# Patient Record
Sex: FEMALE | Race: WHITE
[De-identification: names, ages, dates, MRNs, and addresses within clinical notes are randomized per-mention and may not be internally consistent; named-entity substitution may affect disease eponyms.]

---

## 2018-03-12 ENCOUNTER — HOSPITAL ENCOUNTER (INPATIENT)
Dept: HOSPITAL 74 - JER | Age: 78
LOS: 3 days | Discharge: HOME | DRG: 101 | End: 2018-03-15
Attending: INTERNAL MEDICINE | Admitting: INTERNAL MEDICINE
Payer: COMMERCIAL

## 2018-03-12 VITALS — BODY MASS INDEX: 0.2 KG/M2

## 2018-03-12 DIAGNOSIS — F41.8: ICD-10-CM

## 2018-03-12 DIAGNOSIS — R56.9: Primary | ICD-10-CM

## 2018-03-12 DIAGNOSIS — C50.919: ICD-10-CM

## 2018-03-12 DIAGNOSIS — R55: ICD-10-CM

## 2018-03-12 LAB
ALBUMIN SERPL-MCNC: 3.5 G/DL (ref 3.4–5)
ALP SERPL-CCNC: 120 U/L (ref 45–117)
ALT SERPL-CCNC: 21 U/L (ref 12–78)
ANION GAP SERPL CALC-SCNC: 6 MMOL/L (ref 8–16)
AST SERPL-CCNC: 18 U/L (ref 15–37)
BASOPHILS # BLD: 0.8 % (ref 0–2)
BILIRUB SERPL-MCNC: 0.4 MG/DL (ref 0.2–1)
BUN SERPL-MCNC: 40 MG/DL (ref 7–18)
CALCIUM SERPL-MCNC: 8.2 MG/DL (ref 8.5–10.1)
CHLORIDE SERPL-SCNC: 111 MMOL/L (ref 98–107)
CO2 SERPL-SCNC: 26 MMOL/L (ref 21–32)
CREAT SERPL-MCNC: 0.7 MG/DL (ref 0.55–1.02)
DEPRECATED RDW RBC AUTO: 12.5 % (ref 11.6–15.6)
EOSINOPHIL # BLD: 3.1 % (ref 0–4.5)
GLUCOSE SERPL-MCNC: 103 MG/DL (ref 74–106)
HCT VFR BLD CALC: 32.9 % (ref 32.4–45.2)
HGB BLD-MCNC: 11.6 GM/DL (ref 10.7–15.3)
INR BLD: 1.17 (ref 0.82–1.09)
LYMPHOCYTES # BLD: 19.3 % (ref 8–40)
MAGNESIUM SERPL-MCNC: 2.2 MG/DL (ref 1.8–2.4)
MCH RBC QN AUTO: 31.4 PG (ref 25.7–33.7)
MCHC RBC AUTO-ENTMCNC: 35.4 G/DL (ref 32–36)
MCV RBC: 88.7 FL (ref 80–96)
MONOCYTES # BLD AUTO: 9.4 % (ref 3.8–10.2)
NEUTROPHILS # BLD: 67.4 % (ref 42.8–82.8)
PHOSPHATE SERPL-MCNC: 2.9 MG/DL (ref 2.5–4.9)
PLATELET # BLD AUTO: 177 K/MM3 (ref 134–434)
PMV BLD: 8.3 FL (ref 7.5–11.1)
POTASSIUM SERPLBLD-SCNC: 4 MMOL/L (ref 3.5–5.1)
PROT SERPL-MCNC: 6.3 G/DL (ref 6.4–8.2)
PT PNL PPP: 13.2 SEC (ref 9.98–11.88)
RBC # BLD AUTO: 3.7 M/MM3 (ref 3.6–5.2)
SODIUM SERPL-SCNC: 143 MMOL/L (ref 136–145)
WBC # BLD AUTO: 4.8 K/MM3 (ref 4–10)

## 2018-03-12 PROCEDURE — G0378 HOSPITAL OBSERVATION PER HR: HCPCS

## 2018-03-12 RX ADMIN — TRAZODONE HYDROCHLORIDE SCH: 50 TABLET ORAL at 23:09

## 2018-03-12 NOTE — PDOC
History of Present Illness





- General


Chief Complaint: Syncope/Near Syncope


Stated Complaint: SEIZURE


Time Seen by Provider: 03/12/18 17:19


History Source: Patient, Family (Daughter Mony at bedside )


Exam Limitations: Clinical Condition (Patient has limited hearing in left ear )





- History of Present Illness


Initial Comments: 








77 yr old female with past history of breast carcinoma, schizoaffective disorder

, and osteoarthritis presents to ED via EMS following seizure-like episodes. 

Patient states that she woke up this morning at 4 am with "tremors" in her legs 

and was unable to pour herself milk as her hands were shaking. She walked 

around and the tremors resolved but she continued to feel fatigued.  Around 

noon she went to an oncology appt with Dr. Abreu. While in the office seated in 

the exam chair the patient experienced an episode where head extended backward, 

her jaw dropped and she lost consciousness for about 1 min.  Patient did not 

hit her head during the episode. No shaking, incontinence, or tongue biting was 

noted by her daughter during the event. EMS was called to transfer the patient 

to the ED. While lying on the stretcher, the patient experienced a second 

episode where her head extended, her jaw dropped and she lost consciousness for 

about 1 min. During this episode EMS noted shaking of her limbs.  The patient 

admits to extreme fatigue and weakness prior to the episodes. Patient admits to 

mild headache, feelings of her heart racing, constipation, urinary retention x 

few months, mild SOB with exertion, and cold intolerance.  Patient denies fevers

, chills, changes in vision or difficulty speaking. she denies any fever, 

chills , recent cold, or sick contact. She denies any abdominal pain , N/V 

diarrhea. she denies any swelling in her feet . pt was not seen by physician 

for many years, she has dysuria, frequency and walk up multiple time to pass 

urine but nothing comes out. pt lives with her daughter. 








PMH: breast cancer, schizoaffective disorder, osteoarthritis


PSH: breast cancer left lumbectomy, right ear surgery when she was 8 years 

old. 


FH: breast cancer and colon cancer, prostate cancer


social History: denies any smoking, alcohol or elicit drugs abuse. lives with 

her daughter 


Allergies NKDA but did not tolerate zyprexa the generic brand 

















03/12/18 19:02





03/12/18 19:23








Past History





- Past Medical History


Allergies/Adverse Reactions: 


 Allergies











Allergy/AdvReac Type Severity Reaction Status Date / Time


 


No Known Drug Allergies Allergy   Verified 03/12/18 17:10











Home Medications: 


Ambulatory Orders





LORazepam [Ativan] 1 mg PO TID 06/10/13 


Olanzapine [Zyprexa] 15 mg PO HS 06/10/13 


Sertraline HCl [Zoloft -] 150 mg PO DAILY 06/10/13 


traZODone HCL [Desyrel -] 50 mg PO DAILY 06/10/13 








Cancer: Yes (lt breast)


COPD: No


Psychiatric Problems: Yes (depression, anxeity,schizoaffective disorder )





- Surgical History


Other Surgical History: 


Age 5, ear surgx 


03/12/18 18:45








- Family Disease History


Comment:: 





Family hx of colon CA and breast CA





- Suicide/Smoking/Psychosocial Hx


Smoking History: Never smoked


Have you smoked in the past 12 months: No


Information on smoking cessation initiated: No


Hx Alcohol Use: No


Drug/Substance Use Hx: No


Substance Use Type: None


Patient Lives Alone: No


Lives with/in: Lives with daughter 





*Physical Exam





- Vital Signs


 Last Vital Signs











Temp Pulse Resp BP Pulse Ox


 


 98.0 F   74   18   150/69   100 


 


 03/12/18 17:11  03/12/18 17:11  03/12/18 17:11  03/12/18 17:11  03/12/18 17:11














- Physical Exam


Comments: 


GENERAL: White female well-nourished and well-groomed in no acute distress


HEAD: Normocephalic; atraumatic 


NECK: Supple, no lymphadenopathy, no thyromegaly, trachea midline 


CARDIOVASCULAR: No JVD; Regular at 78 bpm; normal S1/S2 


LUNGS: CTA b/l no wheezes or rhonci 


ABD: No lesions or rashes; Normoactive bowel sounds; mild suprapubic tenderness

; no hepatomegaly/splenomegaly 


NEURO: CN II-XII grossly intact; decreased hearing in left ear; Sensation 

grossly intact UE/LE b/l; strength 5/5 b/l 


MSK: No bony deformities or contractures 











03/12/18 19:00








ED Treatment Course





- LABORATORY


CBC & Chemistry Diagram: 


 03/12/18 18:14





 03/12/18 18:49





- RADIOLOGY


Radiology Studies Ordered: 





03/12/18 19:32


Head CT without contrast: no intra cranial acute pathology





*DC/Admit/Observation/Transfer


Diagnosis at time of Disposition: 


 Seizure





Syncope


Qualifiers:


 Syncope type: unspecified Qualified Code(s): R55 - Syncope and collapse








- Discharge Dispostion


Admit: Yes





- Referrals





- Patient Instructions





- Post Discharge Activity

## 2018-03-12 NOTE — PDOC
Attending Attestation





- HPI


HPI: 








03/12/18 18:33


Pt is a 76 yo F BIBA with a PMHx of Anxiety, Depression, Breast CA, 

Schizophrenia who presents to the ED s/p new onset seizure today. As per 

daughter at bedside, patient was at her oncologists office for routine visit 

when she suddenly stared into space and fell to the ground. Patient seized on 

the ground for >5 minutes. Subsequently, patients post ictal period lasted 5 

minutes and patient returned to baseline. EMS arrived to office and patient 

later seized again enroute to the ED. Patient presents to the ED for further 

evaluation.   





As per daughter, patient woke up experiencing arm and lip tremors which later 

subsided. 


Note, patient on Ativan, Zoloft, Trazodone.  





Patient denies any head trauma, LOC, other injury. 


Patient denies tongue biting, nausea or vomiting. 


Patient   





Oncologist: Lois








- Physicial Exam


PE: 





03/12/18 18:33


GENERAL:


Well developed, well nourished. Awake and alert. No acute distress.


HEENT:


Normocephalic, atraumatic. PERRLA, EOMI. No conjunctival pallor. Sclera are non-

icteric. Moist mucous membranes. Oropharynx is clear.


NECK: Supple. Full ROM. No JVD. Carotid pulses 2+ and symmetric, without 

bruits. No thyromegaly. No lymphadenopathy.


CARDIOVASCULAR:


Regular rate and rhythm. No murmurs, rubs, or gallops. Distal pulses are 2+ and 

symmetric. 


PULMONARY: 


No evidence of respiratory distress. Lungs clear to auscultation bilaterally. 

No wheezing, rales or rhonchi.


ABDOMINAL:


Soft. Non-tender. Non-distended. No rebound or guarding. No organomegaly. 

Normoactive bowel sounds. 


MUSCULOSKELETAL 


Normal range of motion at all joints. No bony deformities or tenderness. No CVA 

tenderness.


EXTREMITIES: 


No cyanosis. No clubbing. No edema. No calf tenderness.


SKIN: Warm and dry. Normal capillary refill. No rashes. No jaundice. 


NEUROLOGICAL: Alert, awake, appropriate. Cranial nerves 2-12 intact. No 

deficits to light touch and temperature in face, upper extremities and lower 

extremities. No motor deficits in the in face, upper extremities and lower 

extremities. Normoreflexic in the upper and lower extremities. Normal speech. 

Toes are down-going bilaterally. 


PSYCHIATRIC: 


Cooperative. Good eye contact. Appropriate mood and affect.








- Medical Decision Making





03/12/18 18:34





Documentation prepared by Janet Arevalo, acting as medical scribe for Lynette Pennington MD





03/12/18 21:02


Cindy paged via phone answering service.


Awaiting call back. 





03/12/18 21:32


Cindy returned the page and the patients case was discussed. 





<Janet Arevalo - Last Filed: 03/12/18 21:32>





- Resident


Resident Name: GriseldaJim





- ED Attending Attestation


I have performed the following: I have examined & evaluated the patient, The 

case was reviewed & discussed with the resident, I agree w/resident's findings 

& plan, Exceptions are as noted





- HPI


HPI: 





03/12/18 17:58


77-year-old woman brought in by ambulance from Dr. Bayron Abreu's office after 

a witnessed seizure.  Patient has no known seizure history.  She was a past 

medical history significant for breast cancer in 2013, lumpectomy and takes 

Xanax, zoloft, trazodone





- Physicial Exam


PE: 








03/12/18 21:35


Spoke with Dr. Molina and the patient is admitted to [facility] telemetry.


Spoke with Dr. White from neurology and the patient will be started on Keppra 

500 twice a day and we will get MRI with contrast.  Gadolinium in the a.m.





<Lynette Pennington - Last Filed: 03/12/18 21:35>

## 2018-03-12 NOTE — HP
Admitting History and Physical





- Primary Care Physician


PCP: Maribell Molina





- Admission


Chief Complaint: seizure


History of Present Illness: 








78 yo F BIBA with a PMHx of Anxiety, Depression, Breast CA, Schizophrenia who 

presents to the ED s/p new onset seizure today. As per daughter at bedside, 

patient was at her oncologists office for routine visit when she suddenly 

stared into space and fell to the ground. Patient seized on the ground for >5 

minutes. Subsequently, patients post ictal period lasted 5 minutes and patient 

returned to baseline. EMS arrived to office and patient later seized again 

enroute to the ED. Patient presents to the ED for further evaluation.   





As per daughter, patient woke up experiencing arm and lip tremors which later 

subsided. 





history taken from ER records..poor historian








- Past Medical History


Psych: Yes: Anxiety, Depression





- Smoking History


Smoking history: Never smoked


Have you smoked in the past 12 months: No





- Alcohol/Substance Use


Hx Alcohol Use: No





Home Medications





- Allergies


Allergies/Adverse Reactions: 


 Allergies











Allergy/AdvReac Type Severity Reaction Status Date / Time


 


No Known Drug Allergies Allergy   Verified 03/12/18 17:10














- Home Medications


Home Medications: 


Ambulatory Orders





LORazepam [Ativan] 1 mg PO TID 06/10/13 


Olanzapine [Zyprexa] 15 mg PO HS 06/10/13 


Sertraline HCl [Zoloft -] 150 mg PO DAILY 06/10/13 


traZODone HCL [Desyrel -] 50 mg PO DAILY 06/10/13 











Physical Examination


Vital Signs: 


 Vital Signs











Temperature  98.0 F   03/12/18 17:11


 


Pulse Rate  74   03/12/18 17:11


 


Respiratory Rate  18   03/12/18 17:11


 


Blood Pressure  150/69   03/12/18 17:11


 


O2 Sat by Pulse Oximetry (%)  97   03/12/18 18:18











Constitutional: Yes: No Distress


HENT: Yes: Atraumatic


Neck: Yes: Supple


Cardiovascular: Yes: Regular Rate and Rhythm


Respiratory: Yes: CTA Bilaterally


Gastrointestinal: Yes: Normal Bowel Sounds


Extremities: Yes: WNL


Labs: 


 CBC, BMP





 03/12/18 18:14 





 03/12/18 18:49 











Imaging





- Results


Cat Scan: Report Reviewed





Problem List





- Problems


(1) Seizure


Assessment/Plan: 


on seizure meds


neuro consult


Code(s): R56.9 - UNSPECIFIED CONVULSIONS   





(2) Syncope


Code(s): R55 - SYNCOPE AND COLLAPSE   


Qualifiers: 


   Syncope type: unspecified   Qualified Code(s): R55 - Syncope and collapse   





(3) Anxiety and depression


Assessment/Plan: 


on meds


continue


Code(s): F41.9 - ANXIETY DISORDER, UNSPECIFIED; F32.9 - MAJOR DEPRESSIVE 

DISORDER, SINGLE EPISODE, UNSPECIFIED   





Assessment/Plan





 Laboratory Tests











  03/12/18 03/12/18 03/12/18





  17:54 18:14 18:14


 


WBC   4.8 


 


RBC   3.70 


 


Hgb   11.6 


 


Hct   32.9 


 


MCV   88.7 


 


MCH   31.4 


 


MCHC   35.4 


 


RDW   12.5 


 


Plt Count   177 


 


MPV   8.3 


 


Neutrophils %   67.4 


 


Lymphocytes %   19.3 


 


Monocytes %   9.4 


 


Eosinophils %   3.1 


 


Basophils %   0.8 


 


PT with INR  13.20 H  


 


INR  1.17 H  


 


Sodium    Cancelled


 


Potassium    Cancelled


 


Chloride    Cancelled


 


Carbon Dioxide    Cancelled


 


Anion Gap    Cancelled


 


BUN    Cancelled


 


Creatinine    Cancelled


 


Creat Clearance w eGFR    Cancelled


 


Random Glucose    Cancelled


 


Calcium    Cancelled


 


Phosphorus   


 


Magnesium   


 


Total Bilirubin    Cancelled


 


AST    Cancelled


 


ALT    Cancelled


 


Alkaline Phosphatase    Cancelled


 


Creatine Kinase    Cancelled


 


Troponin I    Cancelled


 


Total Protein    Cancelled


 


Albumin    Cancelled














  03/12/18 03/12/18 03/12/18





  18:49 18:49 20:07


 


WBC   


 


RBC   


 


Hgb   


 


Hct   


 


MCV   


 


MCH   


 


MCHC   


 


RDW   


 


Plt Count   


 


MPV   


 


Neutrophils %   


 


Lymphocytes %   


 


Monocytes %   


 


Eosinophils %   


 


Basophils %   


 


PT with INR   


 


INR   


 


Sodium  143  


 


Potassium  4.0  


 


Chloride  111 H  


 


Carbon Dioxide  26  


 


Anion Gap  6 L  


 


BUN  40 H  


 


Creatinine  0.7  


 


Creat Clearance w eGFR  > 60  


 


Random Glucose  103  


 


Calcium  8.2 L  


 


Phosphorus    2.9


 


Magnesium    2.2


 


Total Bilirubin  0.4  


 


AST  18  


 


ALT  21  


 


Alkaline Phosphatase  120 H  


 


Creatine Kinase   92 


 


Troponin I   < 0.02 


 


Total Protein  6.3 L  


 


Albumin  3.5  








Active Medications











Generic Name Dose Route Start Last Admin





  Trade Name Freq  PRN Reason Stop Dose Admin


 


Sodium Chloride  1,000 mls @ 75 mls/hr  03/12/18 19:15  03/12/18 19:45





  Normal Saline -  IV   75 mls/hr





  ASDIR CLARICE   Administration


 


Levetiracetam  500 mg  03/13/18 06:00  





  Keppra -  PO   





  BID CLARICE   


 


Trazodone HCl  50 mg  03/12/18 22:00  





  Desyrel -  PO   





  HS CLARICE

## 2018-03-13 LAB
ALBUMIN SERPL-MCNC: 3.1 G/DL (ref 3.4–5)
ALP SERPL-CCNC: 108 U/L (ref 45–117)
ALT SERPL-CCNC: 17 U/L (ref 12–78)
ANION GAP SERPL CALC-SCNC: 7 MMOL/L (ref 8–16)
APPEARANCE UR: CLEAR
AST SERPL-CCNC: 15 U/L (ref 15–37)
BACTERIA #/AREA URNS HPF: (no result) /HPF
BASOPHILS # BLD: 1.4 % (ref 0–2)
BILIRUB SERPL-MCNC: 0.7 MG/DL (ref 0.2–1)
BILIRUB UR STRIP.AUTO-MCNC: NEGATIVE MG/DL
BUN SERPL-MCNC: 26 MG/DL (ref 7–18)
CALCIUM SERPL-MCNC: 8.4 MG/DL (ref 8.5–10.1)
CHLORIDE SERPL-SCNC: 112 MMOL/L (ref 98–107)
CO2 SERPL-SCNC: 27 MMOL/L (ref 21–32)
COLOR UR: (no result)
CREAT SERPL-MCNC: 0.7 MG/DL (ref 0.55–1.02)
DEPRECATED RDW RBC AUTO: 12.1 % (ref 11.6–15.6)
EOSINOPHIL # BLD: 5.4 % (ref 0–4.5)
EPITH CASTS URNS QL MICRO: (no result) /HPF
GLUCOSE SERPL-MCNC: 92 MG/DL (ref 74–106)
HCT VFR BLD CALC: 28.9 % (ref 32.4–45.2)
HGB BLD-MCNC: 10.1 GM/DL (ref 10.7–15.3)
KETONES UR QL STRIP: NEGATIVE
LEUKOCYTE ESTERASE UR QL STRIP.AUTO: (no result)
LYMPHOCYTES # BLD: 33.7 % (ref 8–40)
MCH RBC QN AUTO: 31 PG (ref 25.7–33.7)
MCHC RBC AUTO-ENTMCNC: 35 G/DL (ref 32–36)
MCV RBC: 88.6 FL (ref 80–96)
MONOCYTES # BLD AUTO: 11.6 % (ref 3.8–10.2)
MUCOUS THREADS URNS QL MICRO: (no result)
NEUTROPHILS # BLD: 47.9 % (ref 42.8–82.8)
NITRITE UR QL STRIP: NEGATIVE
PH UR: 6 [PH] (ref 5–8)
PLATELET # BLD AUTO: 143 K/MM3 (ref 134–434)
PMV BLD: 7.6 FL (ref 7.5–11.1)
POTASSIUM SERPLBLD-SCNC: 4.2 MMOL/L (ref 3.5–5.1)
PROT SERPL-MCNC: 5.7 G/DL (ref 6.4–8.2)
PROT UR QL STRIP: NEGATIVE
PROT UR QL STRIP: NEGATIVE
RBC # BLD AUTO: 3.26 M/MM3 (ref 3.6–5.2)
RBC # UR STRIP: NEGATIVE /UL
SODIUM SERPL-SCNC: 146 MMOL/L (ref 136–145)
SP GR UR: 1.01 (ref 1–1.03)
UROBILINOGEN UR STRIP-MCNC: NEGATIVE MG/DL (ref 0.2–1)
WBC # BLD AUTO: 3.3 K/MM3 (ref 4–10)

## 2018-03-13 RX ADMIN — LEVETIRACETAM SCH MG: 500 TABLET, FILM COATED ORAL at 22:44

## 2018-03-13 RX ADMIN — HEPARIN SODIUM SCH UNIT: 5000 INJECTION, SOLUTION INTRAVENOUS; SUBCUTANEOUS at 10:33

## 2018-03-13 RX ADMIN — TRAZODONE HYDROCHLORIDE SCH MG: 50 TABLET ORAL at 22:44

## 2018-03-13 RX ADMIN — LEVETIRACETAM SCH MG: 500 TABLET, FILM COATED ORAL at 06:00

## 2018-03-13 RX ADMIN — LEVETIRACETAM SCH MG: 500 TABLET, FILM COATED ORAL at 10:33

## 2018-03-13 RX ADMIN — HEPARIN SODIUM SCH UNIT: 5000 INJECTION, SOLUTION INTRAVENOUS; SUBCUTANEOUS at 22:45

## 2018-03-13 RX ADMIN — SERTRALINE HYDROCHLORIDE SCH MG: 50 TABLET ORAL at 10:33

## 2018-03-13 RX ADMIN — LETROZOLE SCH MG: 2.5 TABLET, FILM COATED ORAL at 10:34

## 2018-03-13 NOTE — EKG
Test Reason : 

Blood Pressure : ***/*** mmHG

Vent. Rate : 072 BPM     Atrial Rate : 072 BPM

   P-R Int : 148 ms          QRS Dur : 088 ms

    QT Int : 364 ms       P-R-T Axes : 052 003 015 degrees

   QTc Int : 398 ms

 

NORMAL SINUS RHYTHM

POSSIBLE LEFT ATRIAL ENLARGEMENT

BORDERLINE ECG

WHEN COMPARED WITH ECG OF 06-APR-2006 22:31,

NO SIGNIFICANT CHANGE WAS FOUND

Confirmed by MD BERTO, MORIAH ([id number]) on 3/13/2018 12:18:49 PM

 

Referred By:             Confirmed By:MORIAH THOMSON MD

## 2018-03-13 NOTE — CON.NEURO
Consult





- History of Present Illness


History of Present Illness: 


78 yo F BIBA with a PMHx of Anxiety, Depression, Breast CA, Schizophrenia who 

presents to the ED s/p new onset seizure today. As per daughter at bedside, 

patient was at her oncologists office for routine visit when she suddenly 

stared into space and fell to the ground. Patient seized on the ground for >5 

minutes. Subsequently, patients post ictal period lasted 5 minutes and patient 

returned to baseline. EMS arrived to office and patient later seized again 

enroute to the ED. Patient presents to the ED for further evaluation.   





As per daughter, patient woke up experiencing arm and lip tremors which later 

subsided. 


Note, patient on Ativan, Zoloft, Trazodone.  





Patient denies any head trauma, LOC, other injury. 


Patient denies tongue biting, nausea or vomiting. 





MRI Brain: 


COMPARISON: 07/29/2016 MRI brain. FINDINGS: There is image degradation from 

patient motion on all postcontrast sequences, somewhat limiting evaluation and 

decreasing exam sensitivity for enhancing lesions There is no abnormal 

restricted diffusion within the brain. There is no definite enhancing 

intracranial mass, abnormal contrast enhancement within the brain or 

leptomeninges. There is generalized, age-related volume loss with secondary 

prominence of the CSF spaces. There is no hydrocephalus. There is no extra-

axial collection. No evidence of intra-axial hemorrhage. There are no mass 

effects. No midline shift or herniation pattern. The hippocampal formations are 

normal and symmetric in size and signal. The parahippocampal gyri and fornices 

are unremarkable. Flow related signal voids are present in the central arteries 

of the [place] and major dural sinuses. There is a partially empty 

sella turcica. The visualized bone marrow signal is unremarkable. There are no 

fluid levels in the visualized paranasal sinuses. Mild mucosal thickening along 

bilateral anterior ethmoids. There is fluid opacifying air cells and the tip of 

the left mastoid process. The visualized intraorbital contents are grossly 

unremarkable. IMPRESSION: No evidence of mesial temporal sclerosis, 

intracranial mass or cerebral infarction. No mass effects or hydrocephalus. 














- Alcohol/Substance Use


Hx Alcohol Use: No





- Smoking History


Smoking history: Never smoked


Have you smoked in the past 12 months: No





Home Medications





- Allergies


Allergies/Adverse Reactions: 


 Allergies











Allergy/AdvReac Type Severity Reaction Status Date / Time


 


No Known Drug Allergies Allergy   Verified 03/12/18 17:10














- Home Medications


Home Medications: 


Ambulatory Orders





LORazepam [Ativan] 1 mg PO TID 06/10/13 


Olanzapine [Zyprexa] 15 mg PO HS 06/10/13 


Sertraline HCl [Zoloft -] 150 mg PO DAILY 06/10/13 


traZODone HCL [Desyrel -] 50 mg PO DAILY 06/10/13 











Physical Exam-Neuro


Vital Signs: 


 Vital Signs











Temperature  100 F H  03/13/18 17:56


 


Pulse Rate  75   03/13/18 17:56


 


Respiratory Rate  18   03/13/18 17:56


 


Blood Pressure  128/75   03/13/18 17:56


 


O2 Sat by Pulse Oximetry (%)  95   03/13/18 09:00











Labs: 


 CBC, BMP





 03/13/18 05:30 





 03/13/18 05:30 





 INR, PTT











INR  1.17  (0.82-1.09)  H  03/12/18  17:54    














Imaging





- Results


MRI: Report Reviewed, Image Reviewed





Assessment/Plan


78 yo F BIBA with a PMHx of Anxiety, Depression, Breast CA, Schizophrenia who 

presents to the ED s/p new onset seizure x 2 . 


MRI Brain: no acute stroke or mass

## 2018-03-13 NOTE — CON.NEURO
Consult





- History of Present Illness


History of Present Illness: 





78 yo F BIBA with a PMHx of Anxiety, Depression, Breast CA, Schizophrenia who 

presents to the ED s/p new onset seizure today. As per daughter at bedside, 

patient was at her oncologists office for routine visit when she suddenly 

stared into space and fell to the ground, there were no movements at all, the 

LOC lasted for 1 min, she came to and was NOT confused. She was than sat up in 

a chair- had sudden" slumping" of head and LOC lasting 30 seconds, she again 

wasnot confused and no movements noted. . Initial notes document post ictal 

"confusion" but the daughter Mony denies these to me.returned to baseline. EMS 

arrived to office and patient later again had LOCx 30 seconds, this time ?? 

postictal confusion but no movements noted.enroute to the ED. Patient presents 

to the ED for further evaluation.   Pts. daughter also reports a hx. of "racing 

heart beat" in past.





As per daughter, patient woke up experiencing arm and lip tremors which later 

subsided. 


Note, patient on Ativan, Zoloft, Trazodone, Zyprtexa.  





Patient denies any head trauma, LOC, other injury. 


Patient denies tongue biting, nausea or vomiting. 





MRI Brain: 


COMPARISON: 07/29/2016 MRI brain. FINDINGS: There is image degradation from 

patient motion on all postcontrast sequences, somewhat limiting evaluation and 

decreasing exam sensitivity for enhancing lesions There is no abnormal 

restricted diffusion within the brain. There is no definite enhancing 

intracranial mass, abnormal contrast enhancement within the brain or 

leptomeninges. There is generalized, age-related volume loss with secondary 

prominence of the CSF spaces. There is no hydrocephalus. There is no extra-

axial collection. No evidence of intra-axial hemorrhage. There are no mass 

effects. No midline shift or herniation pattern. The hippocampal formations are 

normal and symmetric in size and signal. The parahippocampal gyri and fornices 

are unremarkable. Flow related signal voids are present in the central arteries 

of the [place] and major dural sinuses. There is a partially empty 

sella turcica. The visualized bone marrow signal is unremarkable. There are no 

fluid levels in the visualized paranasal sinuses. Mild mucosal thickening along 

bilateral anterior ethmoids. There is fluid opacifying air cells and the tip of 

the left mastoid process. The visualized intraorbital contents are grossly 

unremarkable. IMPRESSION: No evidence of mesial temporal sclerosis, 

intracranial mass or cerebral infarction. No mass effects or hydrocephalus. 








- Past Medical History


Psych: Yes: Anxiety, Depression





- Alcohol/Substance Use


Hx Alcohol Use: No





- Smoking History


Smoking history: Never smoked


Have you smoked in the past 12 months: No





Home Medications





- Allergies


Allergies/Adverse Reactions: 


 Allergies











Allergy/AdvReac Type Severity Reaction Status Date / Time


 


No Known Drug Allergies Allergy   Verified 03/12/18 17:10














- Home Medications


Home Medications: 


Ambulatory Orders





LORazepam [Ativan] 1 mg PO TID 06/10/13 


Olanzapine [Zyprexa] 15 mg PO HS 06/10/13 


Sertraline HCl [Zoloft -] 150 mg PO DAILY 06/10/13 


traZODone HCL [Desyrel -] 50 mg PO DAILY 06/10/13 











Physical Exam-Neuro


Vital Signs: 


 Vital Signs











Temperature  100 F H  03/13/18 17:56


 


Pulse Rate  75   03/13/18 17:56


 


Respiratory Rate  18   03/13/18 17:56


 


Blood Pressure  128/75   03/13/18 17:56


 


O2 Sat by Pulse Oximetry (%)  95   03/13/18 09:00











Labs: 


 CBC, BMP





 03/13/18 05:30 





 03/13/18 05:30 





 INR, PTT











INR  1.17  (0.82-1.09)  H  03/12/18  17:54    














- Neuro Exam


Level Of Consciousness: Yes: Alert, Oriented to Person, Oriented to Place (Not 

oriented to time/date)


Eyes: Yes: TIAN


Speech: WNL


Dominant Hand: Left


Mini Mental Exam: Impaired attention/concentration. Somewhat pressured speech.


Cranial Nerves II-XII Intact: Yes


Gag: Present


DTR's: 1+ Left Achilles, 1+ Right Achilles (Bilat.knee jerks-1+), 2+ Left Bicep

, 2+ Right Bicep, 2+ Left Tricep, 2+ Right Tricep, 2+ Left Brachioradialis, 2+ 

Right Brachioradialis


Motor Strength: 5/5: Left Arm, Right Arm, Left Leg, Right Leg


Gait: Other (Has been ambulating with cane x years secondary to bilat. knee 

arthritis)





Imaging





- Results


X-ray: Report Reviewed (MRI reviewed)





Assessment/Plan





78 yo F BIBA with a PMHx of Anxiety, Depression, Breast CA, Schizophrenia who 

presents to the ED s/p 3 events that are said to have been seizures. 


MRI Brain: no acute stroke or mass. By the daughters description she appears to 

have had syncopal episodes given lack of movements/incontinence and at least 

after 2 spells clearly no confusion.


Suggest:


Cont. Keppra 500mg bid


Syncope w/u-telemetry, cardiology consultation, carotid ultrasound and 

echocardiogram if deemed necessary by cardiology.





Thank you,





Efraín Oro MD.

## 2018-03-13 NOTE — PN
Progress Note, Physician


History of Present Illness: 





stable





- Current Medication List


Current Medications: 


Active Medications





Acetaminophen (Tylenol -)  650 mg PO Q6H PRN


   PRN Reason: FEVER


Heparin Sodium (Porcine) (Heparin -)  5,000 unit SQ BID [facility]


   Last Admin: 03/13/18 10:33 Dose:  5,000 unit


Letrozole (Femara -)  2.5 mg PO DAILY [facility]


   Last Admin: 03/13/18 10:34 Dose:  2.5 mg


Levetiracetam (Keppra -)  500 mg PO BID [facility]


   Last Admin: 03/13/18 10:33 Dose:  500 mg


Lorazepam (Ativan -)  1 mg PO BID [facility]


   Last Admin: 03/13/18 10:33 Dose:  1 mg


Olanzapine (Zyprexa -)  15 mg PO [facility]


   Last Admin: 03/12/18 23:08 Dose:  Not Given


Sertraline HCl (Zoloft -)  150 mg PO DAILY [facility]


   Last Admin: 03/13/18 10:33 Dose:  150 mg


Trazodone HCl (Desyrel -)  50 mg PO [facility]


   Last Admin: 03/12/18 23:09 Dose:  Not Given











- Objective


Vital Signs: 


 Vital Signs











Temperature  100 F H  03/13/18 17:56


 


Pulse Rate  75   03/13/18 17:56


 


Respiratory Rate  18   03/13/18 17:56


 


Blood Pressure  128/75   03/13/18 17:56


 


O2 Sat by Pulse Oximetry (%)  95   03/13/18 09:00











Constitutional: Yes: No Distress


HENT: Yes: Atraumatic


Neck: Yes: Supple


Cardiovascular: Yes: Regular Rate and Rhythm


Respiratory: Yes: CTA Bilaterally


Gastrointestinal: Yes: Normal Bowel Sounds


Extremities: Yes: WNL


Edema: No


Peripheral Pulses WNL: Yes


Neurological: Yes: Alert, Oriented


Labs: 


 CBC, BMP





 03/13/18 05:30 





 03/13/18 05:30 





 INR, PTT











INR  1.17  (0.82-1.09)  H  03/12/18  17:54    














Problem List





- Problems


(1) Seizure


Assessment/Plan: 


on seizure meds


seizure precautions


neuro consult


Code(s): R56.9 - UNSPECIFIED CONVULSIONS   





(2) Syncope


Code(s): R55 - SYNCOPE AND COLLAPSE   


Qualifiers: 


   Syncope type: unspecified   Qualified Code(s): R55 - Syncope and collapse   





(3) Anxiety and depression


Code(s): F41.9 - ANXIETY DISORDER, UNSPECIFIED; F32.9 - MAJOR DEPRESSIVE 

DISORDER, SINGLE EPISODE, UNSPECIFIED   





Assessment/Plan





d/w daughter in detail care plan

## 2018-03-14 RX ADMIN — HEPARIN SODIUM SCH UNIT: 5000 INJECTION, SOLUTION INTRAVENOUS; SUBCUTANEOUS at 10:40

## 2018-03-14 RX ADMIN — SERTRALINE HYDROCHLORIDE SCH MG: 50 TABLET ORAL at 10:40

## 2018-03-14 RX ADMIN — TRAZODONE HYDROCHLORIDE SCH MG: 50 TABLET ORAL at 21:21

## 2018-03-14 RX ADMIN — HEPARIN SODIUM SCH UNIT: 5000 INJECTION, SOLUTION INTRAVENOUS; SUBCUTANEOUS at 21:34

## 2018-03-14 RX ADMIN — LEVETIRACETAM SCH MG: 500 TABLET, FILM COATED ORAL at 21:21

## 2018-03-14 RX ADMIN — LETROZOLE SCH MG: 2.5 TABLET, FILM COATED ORAL at 10:40

## 2018-03-14 RX ADMIN — LEVETIRACETAM SCH MG: 500 TABLET, FILM COATED ORAL at 10:40

## 2018-03-14 NOTE — PN
Progress Note, Physician


History of Present Illness: 





stable





- Current Medication List


Current Medications: 


Active Medications





Acetaminophen (Tylenol -)  650 mg PO Q6H PRN


   PRN Reason: FEVER


Heparin Sodium (Porcine) (Heparin -)  5,000 unit SQ BID [facility]


   Last Admin: 03/14/18 10:40 Dose:  5,000 unit


Letrozole (Femara -)  2.5 mg PO DAILY [facility]


   Last Admin: 03/14/18 10:40 Dose:  2.5 mg


Levetiracetam (Keppra -)  500 mg PO BID [facility]


   Last Admin: 03/14/18 10:40 Dose:  500 mg


Lorazepam (Ativan -)  1 mg PO BID [facility]


   Last Admin: 03/14/18 10:40 Dose:  1 mg


Olanzapine (Zyprexa -)  15 mg PO HS [facility]


   Last Admin: 03/13/18 22:45 Dose:  15 mg


Sertraline HCl (Zoloft -)  150 mg PO DAILY [facility]


   Last Admin: 03/14/18 10:40 Dose:  150 mg


Trazodone HCl (Desyrel -)  50 mg PO HS [facility]


   Last Admin: 03/13/18 22:44 Dose:  50 mg











- Objective


Vital Signs: 


 Vital Signs











Temperature  98.4 F   03/14/18 14:13


 


Pulse Rate  80   03/14/18 14:13


 


Respiratory Rate  14   03/14/18 14:13


 


Blood Pressure  113/64   03/14/18 14:13


 


O2 Sat by Pulse Oximetry (%)  95   03/14/18 09:00











Constitutional: Yes: No Distress


HENT: Yes: Atraumatic


Neck: Yes: Supple


Cardiovascular: Yes: Regular Rate and Rhythm


Respiratory: Yes: CTA Bilaterally


Gastrointestinal: Yes: Normal Bowel Sounds


Extremities: Yes: WNL


Edema: No


Peripheral Pulses WNL: Yes


Neurological: Yes: Alert, Oriented


Labs: 


 CBC, BMP





 03/13/18 05:30 





 03/13/18 05:30 





 INR, PTT











INR  1.17  (0.82-1.09)  H  03/12/18  17:54    














Problem List





- Problems


(1) Seizure


Assessment/Plan: 


on seizure meds


seizure precautions


neuro consult


mri done report reviewed


Code(s): R56.9 - UNSPECIFIED CONVULSIONS   





(2) Syncope


Assessment/Plan: 


will get carotid us


cardiology consult as per neuro recommendation


troponin negative


Code(s): R55 - SYNCOPE AND COLLAPSE   


Qualifiers: 


   Syncope type: unspecified   Qualified Code(s): R55 - Syncope and collapse   





(3) Anxiety and depression


Code(s): F41.9 - ANXIETY DISORDER, UNSPECIFIED; F32.9 - MAJOR DEPRESSIVE 

DISORDER, SINGLE EPISODE, UNSPECIFIED   





(4) Breast CA


Assessment/Plan: 


h/o


stable


Code(s): C50.919 - MALIGNANT NEOPLASM OF UNSP SITE OF UNSPECIFIED FEMALE BREAST

   


Qualifiers: 


   Laterality: unspecified laterality

## 2018-03-15 VITALS — DIASTOLIC BLOOD PRESSURE: 68 MMHG | SYSTOLIC BLOOD PRESSURE: 118 MMHG

## 2018-03-15 VITALS — HEART RATE: 72 BPM | TEMPERATURE: 98.6 F

## 2018-03-15 RX ADMIN — LEVETIRACETAM SCH MG: 500 TABLET, FILM COATED ORAL at 09:28

## 2018-03-15 RX ADMIN — SERTRALINE HYDROCHLORIDE SCH MG: 50 TABLET ORAL at 09:28

## 2018-03-15 RX ADMIN — LETROZOLE SCH MG: 2.5 TABLET, FILM COATED ORAL at 09:28

## 2018-03-15 RX ADMIN — HEPARIN SODIUM SCH UNIT: 5000 INJECTION, SOLUTION INTRAVENOUS; SUBCUTANEOUS at 09:27

## 2018-03-15 NOTE — DS
Physical Examination


Vital Signs: 


 Vital Signs











Temperature  98.6 F   03/15/18 14:00


 


Pulse Rate  72   03/15/18 14:00


 


Respiratory Rate  16   03/15/18 14:00


 


Blood Pressure  112/56   03/15/18 14:00


 


O2 Sat by Pulse Oximetry (%)  95   03/15/18 09:00











Constitutional: Yes: No Distress


HENT: Yes: Atraumatic


Neck: Yes: Supple


Cardiovascular: Yes: Regular Rate and Rhythm


Respiratory: Yes: CTA Bilaterally


Gastrointestinal: Yes: Normal Bowel Sounds


Extremities: Yes: WNL


Edema: Yes


Peripheral Pulses WNL: Yes


Neurological: Yes: Alert, Oriented


Labs: 


 CBC, BMP





 03/13/18 05:30 





 03/13/18 05:30 











Discharge Summary


Reason For Visit: SEIZURE,SYNCOPE


Current Active Problems





Anxiety and depression (Acute)


Breast CA (Acute)


Seizure (Acute)


Syncope (Acute)











- Instructions





- Home Medications


Comprehensive Discharge Medication List: 


Ambulatory Orders





LORazepam [Ativan] 1 mg PO TID 06/10/13 


Olanzapine [Zyprexa] 15 mg PO HS 06/10/13 


Sertraline HCl [Zoloft -] 150 mg PO DAILY 06/10/13 


traZODone HCL [Desyrel -] 50 mg PO DAILY 06/10/13 





dc home


d/w neuro and cardio...follow up in their offices

## 2018-03-15 NOTE — CON.CARD
Consult


Consult Specialty:: Cardiology


Referred by:: Dr. Molina


Reason for Consultation:: Seizure vs Syncope





- History of Present Illness


Chief Complaint: I "passed out"


History of Present Illness: 





77F with PMH Breast CA admitted to tele with seizure vs syncope.


Episode of altered MS yesterday- " blank stare", unresponsive.





She denies CP, SOB, pleuritic pain.


Denies Palps.


Seen by Neuro, MRI brain negative.





While I was examining her she had another episode of unresponsiveness, eyes open

, blinking, not speaking.


Had pulse entire time and NSR on Tele.


After approximately 10 seconds, regained normal mental status





- History Source


History Provided By: Patient, Medical Record





- Past Medical History


Heme/Onc: Yes: Other (Breast CA)


Psych: Yes: Anxiety, Depression





- Alcohol/Substance Use


Hx Alcohol Use: No





- Smoking History


Smoking history: Never smoked


Have you smoked in the past 12 months: No





- Social History


Usual Living Arrangement: With Child





Home Medications





- Allergies


Allergies/Adverse Reactions: 


 Allergies











Allergy/AdvReac Type Severity Reaction Status Date / Time


 


No Known Drug Allergies Allergy   Verified 03/12/18 17:10














- Home Medications


Home Medications: 


Ambulatory Orders





LORazepam [Ativan] 1 mg PO TID 06/10/13 


Olanzapine [Zyprexa] 15 mg PO HS 06/10/13 


Sertraline HCl [Zoloft -] 150 mg PO DAILY 06/10/13 


traZODone HCL [Desyrel -] 50 mg PO DAILY 06/10/13 











Family Disease History





- Family Disease History


Family History: Unremarkable (not pertinent to this presentation)





Review of Systems


Findings/Remarks: 





ER record reviewed:











"77 yr old female with past history of breast carcinoma, schizoaffective 

disorder, and osteoarthritis presents to ED via EMS following seizure-like 

episodes. Patient states that she woke up this morning at 4 am with "tremors" 

in her legs and was unable to pour herself milk as her hands were shaking. She 

walked around and the tremors resolved but she continued to feel fatigued.  

Around noon she went to an oncology appt with Dr. Abreu. While in the office 

seated in the exam chair the patient experienced an episode where head extended 

backward, her jaw dropped and she lost consciousness for about 1 min.  Patient 

did not hit her head during the episode. No shaking, incontinence, or tongue 

biting was noted by her daughter during the event. EMS was called to transfer 

the patient to the ED. While lying on the stretcher, the patient experienced a 

second episode where her head extended, her jaw dropped and she lost 

consciousness for about 1 min. During this episode EMS noted shaking of her 

limbs.  The patient admits to extreme fatigue and weakness prior to the 

episodes. Patient admits to mild headache, feelings of her heart racing, 

constipation, urinary retention x few months, mild SOB with exertion, and cold 

intolerance.  Patient denies fevers, chills, changes in vision or difficulty 

speaking. she denies any fever, chills , recent cold, or sick contact. She 

denies any abdominal pain , N/V diarrhea. she denies any swelling in her feet . 

pt was not seen by physician for many years, she has dysuria, frequency and 

walk up multiple time to pass urine but nothing comes out. pt lives with her 

daughter. 








PMH: breast cancer, schizoaffective disorder, osteoarthritis"





- Review of Systems


Constitutional: reports: No Symptoms


Eyes: reports: No Symptoms


HENT: reports: No Symptoms


Neck: reports: No Symptoms


Cardiovascular: reports: No Symptoms


Respiratory: reports: No Symptoms


Gastrointestinal: reports: No Symptoms


Genitourinary: reports: No Symptoms


Neurological: reports: Change in LOC


Endocrine: reports: No Symptoms


Hematology/Lymphatic: reports: No Symptoms


Psychiatric: reports: No Symptoms


Vital Signs: 


 Vital Signs











Temperature  98.1 F   03/15/18 05:28


 


Pulse Rate  70   03/15/18 05:28


 


Respiratory Rate  18   03/15/18 05:28


 


Blood Pressure  116/71   03/15/18 05:28


 


O2 Sat by Pulse Oximetry (%)  95   03/14/18 21:00











Constitutional: Yes: No Distress, Calm


Eyes: Yes: Conjunctiva Clear, EOM Intact


HENT: Yes: Atraumatic, Normocephalic


Neck: Yes: Trachea Midline


Respiratory: Yes: CTA Bilaterally


Gastrointestinal: Yes: Soft


Cardiovascular: Yes: Regular Rate and Rhythm, Other (no murmurs)


JVD: No


Carotid Bruit: No


PMI: Non-Displaced


Heart Sounds: Yes: S1, S2 (RRR)


Edema: No


Peripheral Pulses WNL: Yes


Neurological: Yes: Alert


...Motor Strength: WNL





- Other Data


Labs, Other Data: 


 CBC, BMP





 03/13/18 05:30 





 03/13/18 05:30 





 INR, PTT











INR  1.17  (0.82-1.09)  H  03/12/18  17:54    








 Laboratory Tests











  03/12/18 03/12/18 03/13/18





  17:54 18:49 05:30


 


WBC    3.3 L D


 


Hgb    10.1 L D


 


Plt Count    143


 


INR  1.17 H  


 


Sodium   


 


Potassium   


 


BUN   


 


Creatinine   


 


Creatine Kinase   92 


 


Troponin I   < 0.02 














  03/13/18





  05:30


 


WBC 


 


Hgb 


 


Plt Count 


 


INR 


 


Sodium  146 H


 


Potassium  4.2


 


BUN  26 H


 


Creatinine  0.7


 


Creatine Kinase 


 


Troponin I 














NSR, Nl QT


Echo: Pending





Imaging





- Results


Chest X-ray: Report Reviewed, Image Reviewed


Cat Scan: Report Reviewed


MRI: Report Reviewed





Problem List





- Problems


(1) Breast CA


Code(s): C50.919 - MALIGNANT NEOPLASM OF UNSP SITE OF UNSPECIFIED FEMALE BREAST

   


Qualifiers: 


   Laterality: unspecified laterality 





(2) Seizure


Code(s): R56.9 - UNSPECIFIED CONVULSIONS   





Assessment/Plan





IMP:


History of breast CA


Episodes of altered MS, recurrent.  Most likely seizures





Episode during my exam, normal sinus rhythm on telemetry.





REC:


1. Neuro work up in progress


2. Echo for EF assessment; agree with Carotid Duplex to assess vertebral artery 

patency/blood flow.


3. Continue telemetry 


4. Seizure precautions








Thank you.

## 2018-03-15 NOTE — PN
Progress Note (short form)





- Note


Progress Note: 





Addendum : pt appears clinically stable


will get outpt EEG monitoring 


neuro cleared for DC 





Dr White

## 2018-03-15 NOTE — PN
Progress Note (short form)





- Note


Progress Note: 


76 yo F BIBA with a PMHx of Anxiety, Depression, Breast CA, Schizophrenia who 

presents to the ED s/p new onset seizure today. As per daughter at bedside, 

patient was at her oncologists office for routine visit when she suddenly 

stared into space and fell to the ground, there were no movements at all, the 

LOC lasted for 1 min, she came to and was NOT confused. She was than sat up in 

a chair- had sudden" slumping" of head and LOC lasting 30 seconds, she again 

wasnot confused and no movements noted. . Initial notes document post ictal 

"confusion" but the daughter Mony denies these to me.returned to baseline. EMS 

arrived to office and patient later again had LOCx 30 seconds, this time ?? 

postictal confusion but no movements noted.enroute to the ED. Patient presents 

to the ED for further evaluation.   Pts. daughter also reports a hx. of "racing 

heart beat" in past.


Patient denies any head trauma, LOC, other injury. 


Patient denies tongue biting, nausea or vomiting. 








FU : 


on keppra 500BID , as per cardiology while examining her she had staring spell,

  


 MRI (-)





MRI Brain: 


COMPARISON: 07/29/2016 MRI brain. FINDINGS: There is image degradation from 

patient motion on all postcontrast sequences, somewhat limiting evaluation and 

decreasing exam sensitivity for enhancing lesions There is no abnormal 

restricted diffusion within the brain. There is no definite enhancing 

intracranial mass, abnormal contrast enhancement within the brain or 

leptomeninges. There is generalized, age-related volume loss with secondary 

prominence of the CSF spaces. There is no hydrocephalus. There is no extra-

axial collection. No evidence of intra-axial hemorrhage. There are no mass 

effects. No midline shift or herniation pattern. The hippocampal formations are 

normal and symmetric in size and signal. The parahippocampal gyri and fornices 

are unremarkable. Flow related signal voids are present in the central arteries 

of the [place] and major dural sinuses. There is a partially empty 

sella turcica. The visualized bone marrow signal is unremarkable. There are no 

fluid levels in the visualized paranasal sinuses. Mild mucosal thickening along 

bilateral anterior ethmoids. There is fluid opacifying air cells and the tip of 

the left mastoid process. The visualized intraorbital contents are grossly 

unremarkable. IMPRESSION: No evidence of mesial temporal sclerosis, 

intracranial mass or cerebral infarction. No mass effects or hydrocephalus. 








- Past Medical History


Psych: Yes: Anxiety, Depression





- Alcohol/Substance Use


Hx Alcohol Use: No





- Smoking History


Smoking history: Never smoked


Have you smoked in the past 12 months: No





Home Medications





- Allergies


Allergies/Adverse Reactions: 


 Allergies











Allergy/AdvReac Type Severity Reaction Status Date / Time


 


No Known Drug Allergies Allergy   Verified 03/12/18 17:10














- Home Medications


Home Medications: 


Ambulatory Orders





LORazepam [Ativan] 1 mg PO TID 06/10/13 


Olanzapine [Zyprexa] 15 mg PO HS 06/10/13 


Sertraline HCl [Zoloft -] 150 mg PO DAILY 06/10/13 


traZODone HCL [Desyrel -] 50 mg PO DAILY 06/10/13 











Physical Exam-Neuro


Vital Signs: 


 


 Vital Signs











Temperature  98.1 F   03/15/18 05:28


 


Pulse Rate  70   03/15/18 05:28


 


Respiratory Rate  18   03/15/18 05:28


 


Blood Pressure  116/71   03/15/18 05:28


 


O2 Sat by Pulse Oximetry (%)  95   03/14/18 21:00














Labs: 


 CBCD











WBC  3.3 K/mm3 (4.0-10.0)  L D 03/13/18  05:30    


 


RBC  3.26 M/mm3 (3.60-5.2)  L  03/13/18  05:30    


 


Hgb  10.1 GM/dL (10.7-15.3)  L D 03/13/18  05:30    


 


Hct  28.9 % (32.4-45.2)  L  03/13/18  05:30    


 


MCV  88.6 fl (80-96)   03/13/18  05:30    


 


MCHC  35.0 g/dl (32.0-36.0)   03/13/18  05:30    


 


RDW  12.1 % (11.6-15.6)   03/13/18  05:30    


 


Plt Count  143 K/MM3 (134-434)   03/13/18  05:30    


 


MPV  7.6 fl (7.5-11.1)   03/13/18  05:30    








 CMP











Sodium  146 mmol/L (136-145)  H  03/13/18  05:30    


 


Potassium  4.2 mmol/L (3.5-5.1)   03/13/18  05:30    


 


Chloride  112 mmol/L ()  H  03/13/18  05:30    


 


Carbon Dioxide  27 mmol/L (21-32)   03/13/18  05:30    


 


Anion Gap  7  (8-16)  L  03/13/18  05:30    


 


BUN  26 mg/dL (7-18)  H  03/13/18  05:30    


 


Creatinine  0.7 mg/dL (0.55-1.02)   03/13/18  05:30    


 


Creat Clearance w eGFR  > 60  (>60)   03/13/18  05:30    


 


Calcium  8.4 mg/dL (8.5-10.1)  L  03/13/18  05:30    


 


Total Bilirubin  0.7 mg/dL (0.2-1.0)  D 03/13/18  05:30    


 


AST  15 U/L (15-37)   03/13/18  05:30    


 


ALT  17 U/L (12-78)   03/13/18  05:30    


 


Alkaline Phosphatase  108 U/L ()   03/13/18  05:30    


 


Total Protein  5.7 g/dl (6.4-8.2)  L  03/13/18  05:30    


 


Albumin  3.1 g/dl (3.4-5.0)  L  03/13/18  05:30    

















- Neuro Exam


Level Of Consciousness: Yes: Alert, Oriented to Person, Oriented to Place (Not 

oriented to time/date)


Eyes: Yes: TIAN


Speech: WNL


Dominant Hand: Left


Mini Mental Exam: Impaired attention/concentration. Somewhat pressured speech.


Cranial Nerves II-XII Intact: Yes


Gag: Present


DTR's: 1+ Left Achilles, 1+ Right Achilles (Bilat.knee jerks-1+), 2+ Left Bicep

, 2+ Right Bicep, 2+ Left Tricep, 2+ Right Tricep, 2+ Left Brachioradialis, 2+ 

Right Brachioradialis


Motor Strength: 5/5: Left Arm, Right Arm, Left Leg, Right Leg


Gait: Other (Has been ambulating with cane x years secondary to bilat. knee 

arthritis)





Imaging





- Results


X-ray: Report Reviewed (MRI reviewed)





Assessment/Plan





76 yo F BIBA with a PMHx of Anxiety, Depression, Breast CA, Schizophrenia who 

presents to the ED s/p 3 events that are said to have been seizures. 


MRI Brain: no acute stroke or mass. 


? another staring spell event / seizure 


inc Keppra 750mg bid 


get routine EEG 


cardiac FONTANEZ for ? underlying syncope. 








Dr White

## 2018-04-05 ENCOUNTER — EMERGENCY (EMERGENCY)
Facility: HOSPITAL | Age: 78
LOS: 0 days | Discharge: HOME | End: 2018-04-05
Attending: STUDENT IN AN ORGANIZED HEALTH CARE EDUCATION/TRAINING PROGRAM | Admitting: STUDENT IN AN ORGANIZED HEALTH CARE EDUCATION/TRAINING PROGRAM
Payer: MEDICARE

## 2018-04-05 VITALS
TEMPERATURE: 99 F | WEIGHT: 139.99 LBS | DIASTOLIC BLOOD PRESSURE: 73 MMHG | SYSTOLIC BLOOD PRESSURE: 157 MMHG | RESPIRATION RATE: 16 BRPM | OXYGEN SATURATION: 100 % | HEART RATE: 95 BPM

## 2018-04-05 DIAGNOSIS — F20.9 SCHIZOPHRENIA, UNSPECIFIED: ICD-10-CM

## 2018-04-05 DIAGNOSIS — F32.9 MAJOR DEPRESSIVE DISORDER, SINGLE EPISODE, UNSPECIFIED: ICD-10-CM

## 2018-04-05 DIAGNOSIS — Z79.899 OTHER LONG TERM (CURRENT) DRUG THERAPY: ICD-10-CM

## 2018-04-05 DIAGNOSIS — R00.2 PALPITATIONS: ICD-10-CM

## 2018-04-05 DIAGNOSIS — F41.9 ANXIETY DISORDER, UNSPECIFIED: ICD-10-CM

## 2018-04-05 DIAGNOSIS — G47.00 INSOMNIA, UNSPECIFIED: ICD-10-CM

## 2018-04-05 DIAGNOSIS — Z85.3 PERSONAL HISTORY OF MALIGNANT NEOPLASM OF BREAST: ICD-10-CM

## 2018-04-05 LAB
ALBUMIN SERPL ELPH-MCNC: 3.7 G/DL — SIGNIFICANT CHANGE UP (ref 3.3–5)
ALP SERPL-CCNC: 129 U/L — HIGH (ref 40–120)
ALT FLD-CCNC: 27 U/L — SIGNIFICANT CHANGE UP (ref 12–78)
AMMONIA BLD-MCNC: 22 UMOL/L — SIGNIFICANT CHANGE UP (ref 11–32)
ANION GAP SERPL CALC-SCNC: 6 MMOL/L — SIGNIFICANT CHANGE UP (ref 5–17)
APAP SERPL-MCNC: < 2 UG/ML (ref 10–30)
APPEARANCE UR: CLEAR — SIGNIFICANT CHANGE UP
AST SERPL-CCNC: 20 U/L — SIGNIFICANT CHANGE UP (ref 15–37)
BASOPHILS # BLD AUTO: 0.05 K/UL — SIGNIFICANT CHANGE UP (ref 0–0.2)
BASOPHILS NFR BLD AUTO: 1 % — SIGNIFICANT CHANGE UP (ref 0–2)
BILIRUB SERPL-MCNC: 0.4 MG/DL — SIGNIFICANT CHANGE UP (ref 0.2–1.2)
BILIRUB UR-MCNC: NEGATIVE — SIGNIFICANT CHANGE UP
BUN SERPL-MCNC: 22 MG/DL — SIGNIFICANT CHANGE UP (ref 7–23)
CALCIUM SERPL-MCNC: 9.1 MG/DL — SIGNIFICANT CHANGE UP (ref 8.5–10.1)
CHLORIDE SERPL-SCNC: 107 MMOL/L — SIGNIFICANT CHANGE UP (ref 96–108)
CO2 SERPL-SCNC: 29 MMOL/L — SIGNIFICANT CHANGE UP (ref 22–31)
COLOR SPEC: YELLOW — SIGNIFICANT CHANGE UP
CREAT SERPL-MCNC: 1.08 MG/DL — SIGNIFICANT CHANGE UP (ref 0.5–1.3)
DIFF PNL FLD: NEGATIVE — SIGNIFICANT CHANGE UP
EOSINOPHIL # BLD AUTO: 0.05 K/UL — SIGNIFICANT CHANGE UP (ref 0–0.5)
EOSINOPHIL NFR BLD AUTO: 1 % — SIGNIFICANT CHANGE UP (ref 0–6)
ETHANOL SERPL-MCNC: <10 MG/DL — SIGNIFICANT CHANGE UP (ref 0–10)
GLUCOSE SERPL-MCNC: 97 MG/DL — SIGNIFICANT CHANGE UP (ref 70–99)
GLUCOSE UR QL: NEGATIVE MG/DL — SIGNIFICANT CHANGE UP
HCT VFR BLD CALC: 34 % — LOW (ref 34.5–45)
HGB BLD-MCNC: 11.7 G/DL — SIGNIFICANT CHANGE UP (ref 11.5–15.5)
IMM GRANULOCYTES NFR BLD AUTO: 0.2 % — SIGNIFICANT CHANGE UP (ref 0–1.5)
KETONES UR-MCNC: NEGATIVE — SIGNIFICANT CHANGE UP
LEUKOCYTE ESTERASE UR-ACNC: NEGATIVE — SIGNIFICANT CHANGE UP
LYMPHOCYTES # BLD AUTO: 0.64 K/UL — LOW (ref 1–3.3)
LYMPHOCYTES # BLD AUTO: 12.6 % — LOW (ref 13–44)
MAGNESIUM SERPL-MCNC: 2.2 MG/DL — SIGNIFICANT CHANGE UP (ref 1.6–2.6)
MCHC RBC-ENTMCNC: 30.5 PG — SIGNIFICANT CHANGE UP (ref 27–34)
MCHC RBC-ENTMCNC: 34.4 GM/DL — SIGNIFICANT CHANGE UP (ref 32–36)
MCV RBC AUTO: 88.8 FL — SIGNIFICANT CHANGE UP (ref 80–100)
MONOCYTES # BLD AUTO: 0.46 K/UL — SIGNIFICANT CHANGE UP (ref 0–0.9)
MONOCYTES NFR BLD AUTO: 9.1 % — SIGNIFICANT CHANGE UP (ref 2–14)
NEUTROPHILS # BLD AUTO: 3.87 K/UL — SIGNIFICANT CHANGE UP (ref 1.8–7.4)
NEUTROPHILS NFR BLD AUTO: 76.1 % — SIGNIFICANT CHANGE UP (ref 43–77)
NITRITE UR-MCNC: NEGATIVE — SIGNIFICANT CHANGE UP
NRBC # BLD: 0 /100 WBCS — SIGNIFICANT CHANGE UP (ref 0–0)
PH UR: 6.5 — SIGNIFICANT CHANGE UP (ref 5–8)
PLATELET # BLD AUTO: 181 K/UL — SIGNIFICANT CHANGE UP (ref 150–400)
POTASSIUM SERPL-MCNC: 4.2 MMOL/L — SIGNIFICANT CHANGE UP (ref 3.5–5.3)
POTASSIUM SERPL-SCNC: 4.2 MMOL/L — SIGNIFICANT CHANGE UP (ref 3.5–5.3)
PROT SERPL-MCNC: 7.3 GM/DL — SIGNIFICANT CHANGE UP (ref 6–8.3)
PROT UR-MCNC: NEGATIVE MG/DL — SIGNIFICANT CHANGE UP
RBC # BLD: 3.83 M/UL — SIGNIFICANT CHANGE UP (ref 3.8–5.2)
RBC # FLD: 12 % — SIGNIFICANT CHANGE UP (ref 10.3–14.5)
SALICYLATES SERPL-MCNC: <1.7 MG/DL (ref 2.8–20)
SODIUM SERPL-SCNC: 142 MMOL/L — SIGNIFICANT CHANGE UP (ref 135–145)
SP GR SPEC: 1 — LOW (ref 1.01–1.02)
TSH SERPL-MCNC: 0.86 UU/ML — SIGNIFICANT CHANGE UP (ref 0.36–3.74)
UROBILINOGEN FLD QL: NEGATIVE MG/DL — SIGNIFICANT CHANGE UP
WBC # BLD: 5.08 K/UL — SIGNIFICANT CHANGE UP (ref 3.8–10.5)
WBC # FLD AUTO: 5.08 K/UL — SIGNIFICANT CHANGE UP (ref 3.8–10.5)

## 2018-04-05 PROCEDURE — 70450 CT HEAD/BRAIN W/O DYE: CPT | Mod: 26

## 2018-04-05 PROCEDURE — 99284 EMERGENCY DEPT VISIT MOD MDM: CPT

## 2018-04-05 PROCEDURE — 71045 X-RAY EXAM CHEST 1 VIEW: CPT | Mod: 26

## 2018-04-05 PROCEDURE — 93010 ELECTROCARDIOGRAM REPORT: CPT

## 2018-04-05 RX ORDER — LEVETIRACETAM 250 MG/1
1000 TABLET, FILM COATED ORAL ONCE
Qty: 0 | Refills: 0 | Status: COMPLETED | OUTPATIENT
Start: 2018-04-05 | End: 2018-04-05

## 2018-04-05 RX ORDER — LEVETIRACETAM 250 MG/1
1000 TABLET, FILM COATED ORAL ONCE
Qty: 0 | Refills: 0 | Status: DISCONTINUED | OUTPATIENT
Start: 2018-04-05 | End: 2018-04-05

## 2018-04-05 RX ADMIN — LEVETIRACETAM 1000 MILLIGRAM(S): 250 TABLET, FILM COATED ORAL at 16:23

## 2018-04-05 NOTE — ED PROVIDER NOTE - OBJECTIVE STATEMENT
Patient is a 78 yo female with known hx of anxiety, depression, schizophrenia, remote hx of breast cancer; reports that she took "extra dose" of ativan today as her heart is racing "too much for too long"- no chest pain; no sob; reports that she lives with her daughter and  but they were not at home today; reports previous psychiatric hospitalization in 1963, no chest pain; no sob; no abdominal pain; no urinary complaints; no trauma or fall; not on any anticoagulation; patient is a non smoker; no alcohol use; no illicit drug use.

## 2018-04-05 NOTE — ED ADULT TRIAGE NOTE - CHIEF COMPLAINT QUOTE
Pt took "half an Ativan" after difficulty sleeping last night.  (Ativan and Zyprexa prescribed meds.)  Drowsy on arrival.

## 2018-04-05 NOTE — ED PROVIDER NOTE - PROGRESS NOTE DETAILS
Michelle LAGOS: Case discussed with patient's private psychiatrist- Dr. Potter- seen patient for last 12 years; hx of anxiety, insomnia- has never required hospitalization during this time; no concern for SI/HI, patient was hospitalized in outside hospital 3 weeks ago for seizure disorder- supposed to be taking keppra but is non compliant with regimen, lives with daughter at home. Michelle DO: Patient with ?seizure activity in ED- will give dose of Keppra while in ED; will discuss case with case management regarding disposition home. Michelle LAGOS: Patient's daughter to pick her up tomorrow afternoon; s/o to Dr. Lu pending transport home tomorrow. Michelle LAGOS: Patient's daughter at bedside to pick patient up at this time.

## 2018-04-05 NOTE — ED ADULT NURSE NOTE - OBJECTIVE STATEMENT
Patient comes to ED for sleep issues last night. pt reports having feeling of fast heartbeat that has been happening often and made her unable to sleep. pt reports taking anxiety medication which they decreased recently. Pt reports taking an ativan which she is prescribed to help her relax. pt denies any chest pain or SOB. pt denies any suicidal or homicidal ideations. Pt is drowsy but arousable. Pt is AxOx4

## 2018-04-05 NOTE — ED PROVIDER NOTE - MEDICAL DECISION MAKING DETAILS
78 yo female with anxiety; insomnia; previous with not being able to sleep; CT scan of head; labs; EKG/CXR; UA; re-eval

## 2018-04-06 VITALS
SYSTOLIC BLOOD PRESSURE: 115 MMHG | OXYGEN SATURATION: 97 % | DIASTOLIC BLOOD PRESSURE: 63 MMHG | RESPIRATION RATE: 16 BRPM | HEART RATE: 81 BPM

## 2018-04-06 LAB
CULTURE RESULTS: NO GROWTH — SIGNIFICANT CHANGE UP
SPECIMEN SOURCE: SIGNIFICANT CHANGE UP

## 2018-04-06 RX ORDER — SERTRALINE 25 MG/1
100 TABLET, FILM COATED ORAL DAILY
Qty: 0 | Refills: 0 | Status: DISCONTINUED | OUTPATIENT
Start: 2018-04-06 | End: 2018-04-05

## 2018-04-06 RX ADMIN — Medication 1 MILLIGRAM(S): at 07:28

## 2018-04-06 RX ADMIN — SERTRALINE 100 MILLIGRAM(S): 25 TABLET, FILM COATED ORAL at 07:30

## 2018-04-06 NOTE — ED ADULT NURSE REASSESSMENT NOTE - NS ED NURSE REASSESS COMMENT FT1
Ellen GAVIN stated pt's daughter will not be here until at least 5 pm. Pt resting comfortable and ate lunch.
Patient ambulated to bathroom with assist
Pt awaiting daughter to . daughter is coming from [place]. Pt has 24 hr aide and they left the key in the house  and doesn't have a spare key. will continue to monitor.
Pt sleeping in bed comfortably with no complaints. VSS. pt will be awaiting ride tomorrow from daughter
Pt's daughter ge called and stated her other sister is coming to . Not sure what time she will get here and doesn't have cell phone.
RN left message for pt's daughter Otto and Sara re p/u of pt.  Awaiting call back.
Report taken at the change of shift at bedside. Pt awake alert and oriented x4 sleeping in bed with no acute distress noted. Awaiting for daughter for . Will cont to monitor for safety and comfort.
ambulation trial completed
pt resting comfortably in bed with no acute distress noted. Will cont to monitor for safety and comfort.
pt resting comfortably in bed with no acute distress noted. vss. Will cont to monitor for safety and comfort.
pt resting comfortably in bed with no acute distress noted. vss. awaiting for daughter for . Will cont to monitor for safety and comfort.

## 2018-04-06 NOTE — ED ADULT NURSE REASSESSMENT NOTE - STATUS
awaiting discharge, no change
awaiting for daughter to /awaiting discharge, no change
awaiting discharge, no change
awaiting discharge, no change/awaiting for daughter to 
awaiting discharge, no change/daughter to

## 2018-04-06 NOTE — ED ADULT NURSE REASSESSMENT NOTE - COMFORT CARE
ambulated to bathroom
meal provided/plan of care explained/po fluids offered/assisted to bathroom
plan of care explained/po fluids offered/assisted to bathroom
plan of care explained/po fluids offered/assisted to bathroom/meal provided
assisted to bathroom/plan of care explained
po fluids offered/meal provided/plan of care explained

## 2018-04-06 NOTE — ED ADULT NURSE REASSESSMENT NOTE - GENERAL PATIENT STATE
comfortable appearance

## 2018-04-06 NOTE — ED ADULT NURSE REASSESSMENT NOTE - TEMPLATE LIST FOR HEAD TO TOE ASSESSMENT
Psych/Behavioral

## 2018-04-07 ENCOUNTER — EMERGENCY (EMERGENCY)
Facility: HOSPITAL | Age: 78
LOS: 1 days | Discharge: PSYCHIATRIC OR SUBSTANCE USE FACILITY | End: 2018-04-07
Attending: EMERGENCY MEDICINE
Payer: MEDICARE

## 2018-04-07 VITALS
SYSTOLIC BLOOD PRESSURE: 117 MMHG | RESPIRATION RATE: 18 BRPM | HEIGHT: 63.5 IN | OXYGEN SATURATION: 100 % | DIASTOLIC BLOOD PRESSURE: 65 MMHG | HEART RATE: 84 BPM | TEMPERATURE: 98 F | WEIGHT: 134.92 LBS

## 2018-04-07 DIAGNOSIS — F33.9 MAJOR DEPRESSIVE DISORDER, RECURRENT, UNSPECIFIED: ICD-10-CM

## 2018-04-07 DIAGNOSIS — R69 ILLNESS, UNSPECIFIED: ICD-10-CM

## 2018-04-07 LAB
ALBUMIN SERPL ELPH-MCNC: 4.6 G/DL — SIGNIFICANT CHANGE UP (ref 3.3–5)
ALP SERPL-CCNC: 146 U/L — HIGH (ref 40–120)
ALT FLD-CCNC: 21 U/L RC — SIGNIFICANT CHANGE UP (ref 10–45)
ANION GAP SERPL CALC-SCNC: 15 MMOL/L — SIGNIFICANT CHANGE UP (ref 5–17)
APAP SERPL-MCNC: <15 UG/ML — SIGNIFICANT CHANGE UP (ref 10–30)
APPEARANCE UR: ABNORMAL
AST SERPL-CCNC: 19 U/L — SIGNIFICANT CHANGE UP (ref 10–40)
BACTERIA # UR AUTO: ABNORMAL /HPF
BASOPHILS # BLD AUTO: 0 K/UL — SIGNIFICANT CHANGE UP (ref 0–0.2)
BASOPHILS NFR BLD AUTO: 0.4 % — SIGNIFICANT CHANGE UP (ref 0–2)
BILIRUB SERPL-MCNC: 0.4 MG/DL — SIGNIFICANT CHANGE UP (ref 0.2–1.2)
BILIRUB UR-MCNC: NEGATIVE — SIGNIFICANT CHANGE UP
BUN SERPL-MCNC: 32 MG/DL — HIGH (ref 7–23)
CALCIUM SERPL-MCNC: 10.4 MG/DL — SIGNIFICANT CHANGE UP (ref 8.4–10.5)
CHLORIDE SERPL-SCNC: 101 MMOL/L — SIGNIFICANT CHANGE UP (ref 96–108)
CO2 SERPL-SCNC: 26 MMOL/L — SIGNIFICANT CHANGE UP (ref 22–31)
COLOR SPEC: YELLOW — SIGNIFICANT CHANGE UP
CREAT SERPL-MCNC: 0.9 MG/DL — SIGNIFICANT CHANGE UP (ref 0.5–1.3)
DIFF PNL FLD: NEGATIVE — SIGNIFICANT CHANGE UP
EOSINOPHIL # BLD AUTO: 0.1 K/UL — SIGNIFICANT CHANGE UP (ref 0–0.5)
EOSINOPHIL NFR BLD AUTO: 1.2 % — SIGNIFICANT CHANGE UP (ref 0–6)
EPI CELLS # UR: SIGNIFICANT CHANGE UP /HPF
ETHANOL SERPL-MCNC: SIGNIFICANT CHANGE UP MG/DL (ref 0–10)
GLUCOSE SERPL-MCNC: 146 MG/DL — HIGH (ref 70–99)
GLUCOSE UR QL: NEGATIVE — SIGNIFICANT CHANGE UP
HCT VFR BLD CALC: 37.1 % — SIGNIFICANT CHANGE UP (ref 34.5–45)
HGB BLD-MCNC: 13.1 G/DL — SIGNIFICANT CHANGE UP (ref 11.5–15.5)
KETONES UR-MCNC: NEGATIVE — SIGNIFICANT CHANGE UP
LEUKOCYTE ESTERASE UR-ACNC: ABNORMAL
LYMPHOCYTES # BLD AUTO: 0.9 K/UL — LOW (ref 1–3.3)
LYMPHOCYTES # BLD AUTO: 14.5 % — SIGNIFICANT CHANGE UP (ref 13–44)
MCHC RBC-ENTMCNC: 32 PG — SIGNIFICANT CHANGE UP (ref 27–34)
MCHC RBC-ENTMCNC: 35.4 GM/DL — SIGNIFICANT CHANGE UP (ref 32–36)
MCV RBC AUTO: 90.3 FL — SIGNIFICANT CHANGE UP (ref 80–100)
MONOCYTES # BLD AUTO: 0.4 K/UL — SIGNIFICANT CHANGE UP (ref 0–0.9)
MONOCYTES NFR BLD AUTO: 5.8 % — SIGNIFICANT CHANGE UP (ref 2–14)
NEUTROPHILS # BLD AUTO: 5 K/UL — SIGNIFICANT CHANGE UP (ref 1.8–7.4)
NEUTROPHILS NFR BLD AUTO: 78.1 % — HIGH (ref 43–77)
NITRITE UR-MCNC: NEGATIVE — SIGNIFICANT CHANGE UP
PCP SPEC-MCNC: SIGNIFICANT CHANGE UP
PH UR: 6.5 — SIGNIFICANT CHANGE UP (ref 5–8)
PLATELET # BLD AUTO: 199 K/UL — SIGNIFICANT CHANGE UP (ref 150–400)
POTASSIUM SERPL-MCNC: 4.2 MMOL/L — SIGNIFICANT CHANGE UP (ref 3.5–5.3)
POTASSIUM SERPL-SCNC: 4.2 MMOL/L — SIGNIFICANT CHANGE UP (ref 3.5–5.3)
PROT SERPL-MCNC: 8.3 G/DL — SIGNIFICANT CHANGE UP (ref 6–8.3)
PROT UR-MCNC: SIGNIFICANT CHANGE UP
RBC # BLD: 4.11 M/UL — SIGNIFICANT CHANGE UP (ref 3.8–5.2)
RBC # FLD: 11.1 % — SIGNIFICANT CHANGE UP (ref 10.3–14.5)
RBC CASTS # UR COMP ASSIST: SIGNIFICANT CHANGE UP /HPF (ref 0–2)
SALICYLATES SERPL-MCNC: <2 MG/DL — LOW (ref 15–30)
SODIUM SERPL-SCNC: 142 MMOL/L — SIGNIFICANT CHANGE UP (ref 135–145)
SP GR SPEC: 1.02 — SIGNIFICANT CHANGE UP (ref 1.01–1.02)
UROBILINOGEN FLD QL: NEGATIVE — SIGNIFICANT CHANGE UP
WBC # BLD: 6.4 K/UL — SIGNIFICANT CHANGE UP (ref 3.8–10.5)
WBC # FLD AUTO: 6.4 K/UL — SIGNIFICANT CHANGE UP (ref 3.8–10.5)
WBC UR QL: SIGNIFICANT CHANGE UP /HPF (ref 0–5)

## 2018-04-07 PROCEDURE — 99283 EMERGENCY DEPT VISIT LOW MDM: CPT | Mod: GC

## 2018-04-07 NOTE — ED PROVIDER NOTE - PHYSICAL EXAMINATION
Physical Exam: elderly F who is AAOx3, cooperative with examination, non-labored breathing, normal heart rate, normal peripheral perfusion, No focal motor or sensory deficits. ~ Asher Zhou MD
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
## 2018-04-07 NOTE — ED BEHAVIORAL HEALTH ASSESSMENT NOTE - OTHER PAST PSYCHIATRIC HISTORY (INCLUDE DETAILS REGARDING ONSET, COURSE OF ILLNESS, INPATIENT/OUTPATIENT TREATMENT)
long psychiatric history, patient states depressed since 2 years old, one suicide attempt at age 13, last psychiatric hospitalization in 1960s, has seen psychiatrists since
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
## 2018-04-07 NOTE — ED ADULT TRIAGE NOTE - CHIEF COMPLAINT QUOTE
Pt's daughter stated that pt told her she wants to kill herself. Pt and son in law got into an argument. pt with schizo affective disorder
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
## 2018-04-07 NOTE — ED BEHAVIORAL HEALTH ASSESSMENT NOTE - DESCRIPTION
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

## 2018-04-07 NOTE — ED PROVIDER NOTE - OBJECTIVE STATEMENT
Asher Zhou MD (resident): 77 F w/ Hx of bipolar d/o, anxiety and depression, who p/w suicidal ideation.   Meds: zoloft, ativan, zyprexa, trazadone, letrozole. Asher Zhou MD (resident): 77 F w/ Hx of schizoaffective d/o, bipolar d/o, anxiety and depression, who p/w suicidal ideation. Daughter reported that pt wants to kill herself. Pt recently moved in w/ daughter. Had argument w/ son in law. Pt asked for a knife to cut herself with. Pt denies any HI or hallucinations.   Meds: zoloft, ativan, zyprexa, trazadone, letrozole.

## 2018-04-07 NOTE — ED ADULT TRIAGE NOTE - NS_BH TRG QUESTION7_ED_ALL_ED
Anxiety (includes Panic, OCD)/Other/Depression (without Suicidality or Psychosis)/Behavioral Problems (includes ADHD, Oppositionality)

## 2018-04-07 NOTE — ED ADULT NURSE REASSESSMENT NOTE - NS ED NURSE REASSESS COMMENT FT1
Pt waiting for bed at [facility], per MD no beds available for this evening, pt to stay in unit under constant observation for the evening.
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
## 2018-04-07 NOTE — ED BEHAVIORAL HEALTH ASSESSMENT NOTE - SUMMARY
Ms. Bernard is a 78 y/o woman with long past psychiatric history (unclear diagnosis), starting at age 2 per patient, one suicide attempt at age 13 by sticking head in oven, per daughter last psychiatric hospitalization was in the 1960s,  x3 years, currently domiciled with daughter and son in law in [place], Past medical history s/p breast cancer and arthritis, now BIB daughter to ED in context of worsening depression and suicidal ideation with plan.  Given that patient's depression is worsening, that she continues to have SI currently with plan to slit her wrists, and given unstable home situation and unclear whether patient is safe to return home to son in law, would recommend psychiatric hospitalization. Patient and daughter would like admission and signed voluntary paperwork.

## 2018-04-07 NOTE — ED BEHAVIORAL HEALTH ASSESSMENT NOTE - CURRENT MEDICATION
Letrozole 2.5 mg po daily, Ativan 1 mg po bid, Trazodone 50 mg po qHS, Zyprexa 15 mg po qHS, Zoloft 100 mg po daily

## 2018-04-07 NOTE — ED ADULT NURSE NOTE - NS_BH TRG QUESTION7_ED_ALL_ED
Other/Behavioral Problems (includes ADHD, Oppositionality)/Depression (without Suicidality or Psychosis)/Anxiety (includes Panic, OCD)

## 2018-04-07 NOTE — ED PROVIDER NOTE - PROGRESS NOTE DETAILS
Asher Zhou MD (resident): patient seen by Psych attending who recommended voluntary admission to psychiatric facility. Currently no beds at [facility], she will reach out to . Asher Zhou MD (resident): called by Psych attending and no medical bed at [facility], reason for medical bed is that pt uses walker to ambulate. will need to stay in the ED until AM for further placement w/ SW (on call SW was called by Psych, but unable to expedite placement over night, referred to AM SW). medically cleared, nml VS, tolerating PO, remaining stable, transferred for continued inpatient psychiatric services / monitoring to [facility] [place] -- Gunner Yao MD

## 2018-04-08 VITALS
RESPIRATION RATE: 18 BRPM | SYSTOLIC BLOOD PRESSURE: 146 MMHG | DIASTOLIC BLOOD PRESSURE: 77 MMHG | HEART RATE: 92 BPM | TEMPERATURE: 99 F | OXYGEN SATURATION: 98 %

## 2018-04-08 LAB
CULTURE RESULTS: SIGNIFICANT CHANGE UP
SPECIMEN SOURCE: SIGNIFICANT CHANGE UP

## 2018-04-08 PROCEDURE — 85027 COMPLETE CBC AUTOMATED: CPT

## 2018-04-08 PROCEDURE — 81001 URINALYSIS AUTO W/SCOPE: CPT

## 2018-04-08 PROCEDURE — 99285 EMERGENCY DEPT VISIT HI MDM: CPT

## 2018-04-08 PROCEDURE — 93005 ELECTROCARDIOGRAM TRACING: CPT

## 2018-04-08 PROCEDURE — 80053 COMPREHEN METABOLIC PANEL: CPT

## 2018-04-08 PROCEDURE — 87086 URINE CULTURE/COLONY COUNT: CPT

## 2018-04-08 PROCEDURE — 80307 DRUG TEST PRSMV CHEM ANLYZR: CPT

## 2018-04-08 RX ORDER — LETROZOLE 2.5 MG/1
2.5 TABLET, FILM COATED ORAL ONCE
Qty: 0 | Refills: 0 | Status: COMPLETED | OUTPATIENT
Start: 2018-04-08 | End: 2018-04-08

## 2018-04-08 RX ORDER — SERTRALINE 25 MG/1
100 TABLET, FILM COATED ORAL ONCE
Qty: 0 | Refills: 0 | Status: COMPLETED | OUTPATIENT
Start: 2018-04-08 | End: 2018-04-08

## 2018-04-08 RX ADMIN — Medication 1 MILLIGRAM(S): at 07:42

## 2018-04-08 RX ADMIN — SERTRALINE 100 MILLIGRAM(S): 25 TABLET, FILM COATED ORAL at 09:01

## 2018-04-08 RX ADMIN — LETROZOLE 2.5 MILLIGRAM(S): 2.5 TABLET, FILM COATED ORAL at 11:45

## 2018-04-08 NOTE — ED ADULT NURSE REASSESSMENT NOTE - NS ED NURSE REASSESS COMMENT FT1
Received report from ED RN Gunner; patient currently sleeping and remains on 1:1 for safety and SI. Will continue to monitor and patient safety maintained.

## 2018-04-08 NOTE — ED ADULT NURSE REASSESSMENT NOTE - NS ED NURSE REASSESS COMMENT FT1
Patient remains asleep- 1:1 constant observation still in progress. Will continue to monitor and patient safety maintained.

## 2018-04-08 NOTE — ED ADULT NURSE REASSESSMENT NOTE - NS ED NURSE REASSESS COMMENT FT1
Patient resting comfortably- 1:1 constant observation still in progress. Will continue to monitor and patient safety maintained.

## 2018-04-09 PROBLEM — C50.919 MALIGNANT NEOPLASM OF UNSPECIFIED SITE OF UNSPECIFIED FEMALE BREAST: Chronic | Status: ACTIVE | Noted: 2018-04-05

## 2018-04-09 PROBLEM — F20.9 SCHIZOPHRENIA, UNSPECIFIED: Chronic | Status: ACTIVE | Noted: 2018-04-05

## 2018-08-11 ENCOUNTER — EMERGENCY (EMERGENCY)
Facility: HOSPITAL | Age: 78
LOS: 1 days | Discharge: HOME | End: 2018-08-11
Attending: EMERGENCY MEDICINE
Payer: MEDICARE

## 2018-08-11 VITALS
HEART RATE: 84 BPM | SYSTOLIC BLOOD PRESSURE: 97 MMHG | TEMPERATURE: 99 F | HEIGHT: 64 IN | RESPIRATION RATE: 16 BRPM | OXYGEN SATURATION: 96 % | WEIGHT: 138.89 LBS | DIASTOLIC BLOOD PRESSURE: 61 MMHG

## 2018-08-11 DIAGNOSIS — F25.0 SCHIZOAFFECTIVE DISORDER, BIPOLAR TYPE: ICD-10-CM

## 2018-08-11 LAB
AMPHET UR-MCNC: NEGATIVE — SIGNIFICANT CHANGE UP
ANION GAP SERPL CALC-SCNC: 13 MMOL/L — SIGNIFICANT CHANGE UP (ref 5–17)
APPEARANCE UR: CLEAR — SIGNIFICANT CHANGE UP
BACTERIA # UR AUTO: ABNORMAL
BARBITURATES UR SCN-MCNC: NEGATIVE — SIGNIFICANT CHANGE UP
BENZODIAZ UR-MCNC: NEGATIVE — SIGNIFICANT CHANGE UP
BILIRUB UR-MCNC: NEGATIVE — SIGNIFICANT CHANGE UP
BUN SERPL-MCNC: 22 MG/DL — HIGH (ref 8–20)
CALCIUM SERPL-MCNC: 9 MG/DL — SIGNIFICANT CHANGE UP (ref 8.6–10.2)
CHLORIDE SERPL-SCNC: 103 MMOL/L — SIGNIFICANT CHANGE UP (ref 98–107)
CO2 SERPL-SCNC: 24 MMOL/L — SIGNIFICANT CHANGE UP (ref 22–29)
COCAINE METAB.OTHER UR-MCNC: NEGATIVE — SIGNIFICANT CHANGE UP
COLOR SPEC: YELLOW — SIGNIFICANT CHANGE UP
CREAT SERPL-MCNC: 0.93 MG/DL — SIGNIFICANT CHANGE UP (ref 0.5–1.3)
DIFF PNL FLD: NEGATIVE — SIGNIFICANT CHANGE UP
EPI CELLS # UR: SIGNIFICANT CHANGE UP
GLUCOSE SERPL-MCNC: 106 MG/DL — SIGNIFICANT CHANGE UP (ref 70–115)
GLUCOSE UR QL: NEGATIVE MG/DL — SIGNIFICANT CHANGE UP
HCT VFR BLD CALC: 31.6 % — LOW (ref 37–47)
HGB BLD-MCNC: 10.2 G/DL — LOW (ref 12–16)
KETONES UR-MCNC: NEGATIVE — SIGNIFICANT CHANGE UP
LEUKOCYTE ESTERASE UR-ACNC: ABNORMAL
MCHC RBC-ENTMCNC: 28.5 PG — SIGNIFICANT CHANGE UP (ref 27–31)
MCHC RBC-ENTMCNC: 32.3 G/DL — SIGNIFICANT CHANGE UP (ref 32–36)
MCV RBC AUTO: 88.3 FL — SIGNIFICANT CHANGE UP (ref 81–99)
METHADONE UR-MCNC: NEGATIVE — SIGNIFICANT CHANGE UP
NITRITE UR-MCNC: NEGATIVE — SIGNIFICANT CHANGE UP
OPIATES UR-MCNC: NEGATIVE — SIGNIFICANT CHANGE UP
PCP SPEC-MCNC: SIGNIFICANT CHANGE UP
PCP UR-MCNC: NEGATIVE — SIGNIFICANT CHANGE UP
PH UR: 7 — SIGNIFICANT CHANGE UP (ref 5–8)
PLATELET # BLD AUTO: 189 K/UL — SIGNIFICANT CHANGE UP (ref 150–400)
POTASSIUM SERPL-MCNC: 4.4 MMOL/L — SIGNIFICANT CHANGE UP (ref 3.5–5.3)
POTASSIUM SERPL-SCNC: 4.4 MMOL/L — SIGNIFICANT CHANGE UP (ref 3.5–5.3)
PROT UR-MCNC: NEGATIVE MG/DL — SIGNIFICANT CHANGE UP
RBC # BLD: 3.58 M/UL — LOW (ref 4.4–5.2)
RBC # FLD: 13.2 % — SIGNIFICANT CHANGE UP (ref 11–15.6)
RBC CASTS # UR COMP ASSIST: SIGNIFICANT CHANGE UP /HPF (ref 0–4)
SODIUM SERPL-SCNC: 140 MMOL/L — SIGNIFICANT CHANGE UP (ref 135–145)
SP GR SPEC: 1.01 — SIGNIFICANT CHANGE UP (ref 1.01–1.02)
THC UR QL: NEGATIVE — SIGNIFICANT CHANGE UP
UROBILINOGEN FLD QL: NEGATIVE MG/DL — SIGNIFICANT CHANGE UP
WBC # BLD: 6.1 K/UL — SIGNIFICANT CHANGE UP (ref 4.8–10.8)
WBC # FLD AUTO: 6.1 K/UL — SIGNIFICANT CHANGE UP (ref 4.8–10.8)
WBC UR QL: ABNORMAL

## 2018-08-11 PROCEDURE — 99285 EMERGENCY DEPT VISIT HI MDM: CPT

## 2018-08-11 PROCEDURE — 99284 EMERGENCY DEPT VISIT MOD MDM: CPT

## 2018-08-11 RX ORDER — NITROFURANTOIN MACROCRYSTAL 50 MG
100 CAPSULE ORAL
Qty: 0 | Refills: 0 | Status: DISCONTINUED | OUTPATIENT
Start: 2018-08-11 | End: 2018-08-11

## 2018-08-11 RX ORDER — OLANZAPINE 15 MG/1
0 TABLET, FILM COATED ORAL
Qty: 0 | Refills: 0 | COMMUNITY

## 2018-08-11 RX ORDER — OLANZAPINE 15 MG/1
20 TABLET, FILM COATED ORAL AT BEDTIME
Qty: 0 | Refills: 0 | Status: DISCONTINUED | OUTPATIENT
Start: 2018-08-11 | End: 2018-08-11

## 2018-08-11 RX ORDER — SERTRALINE 25 MG/1
100 TABLET, FILM COATED ORAL DAILY
Qty: 0 | Refills: 0 | Status: DISCONTINUED | OUTPATIENT
Start: 2018-08-11 | End: 2018-08-16

## 2018-08-11 RX ORDER — OLANZAPINE 15 MG/1
20 TABLET, FILM COATED ORAL ONCE
Qty: 0 | Refills: 0 | Status: DISCONTINUED | OUTPATIENT
Start: 2018-08-11 | End: 2018-08-11

## 2018-08-11 RX ADMIN — Medication 1 TABLET(S): at 21:55

## 2018-08-11 RX ADMIN — Medication 1 MILLIGRAM(S): at 20:15

## 2018-08-11 NOTE — ED BEHAVIORAL HEALTH ASSESSMENT NOTE - SUMMARY
Pt. is a 76 yo female BIBA from [facility] after she called the  to tell them she was going to kill herself.  Pt. reports long hx of mental illness since 1963. States she was living in [place] with her  who was in the  - when he was shipped out she had a mental breakdown and spent many months in [facility].  She repeatedly states she wants to be admitted to a psychiatric facility and "stay there" for the rest of her life.  She reports she was most recently hospitalized at [facility] and would like to live there if possible.  She had lived with her daughter and son-in-law who kicked her out and she was sent to the [facility].  States she hates the [facility] - the hallway is too long for her to walk, she doesn't know anyone and she stays in her room and sleeps day and night.  States she wants to kill herself - she will buy a gun and shoot herself or find knives to kill herself.  Does reports she has hx of auditory and visual hallucinations but not since she started taking medications.  Pt. requires inpatient hospitalization for stabilization of her medications. Pt. is a 78 yo female BIBA from [facility] after she called the  to tell them she was going to kill herself.  Pt. reports long hx of mental illness since 1963. States she was living in [place] with her  who was in the  - when he was shipped out she had a mental breakdown and spent many months in [facility].  She repeatedly states she wants to be admitted to a psychiatric facility and "stay there" for the rest of her life.  She reports she was most recently hospitalized at [facility] and would like to live there if possible.  She had lived with her daughter who is not able to care for her and she was sent to the [facility].  States she hates the [facility] - the hallway is too long for her to walk, she doesn't know anyone and she stays in her room and sleeps day and night.  States she wants to kill herself - she will buy a gun and shoot herself or find knives to kill herself.  Does reports she has hx of auditory and visual hallucinations but not since she started taking medications.  Pt. requires inpatient hospitalization for stabilization of her medications.

## 2018-08-11 NOTE — ED ADULT NURSE NOTE - OBJECTIVE STATEMENT
Patient was brought in from her residence after she was expressing a desire to shoot her self with a gun.  Patient does not have access to a gun but endorses she does have feelings to "blow my brains out".  Patient reports she does not like her residence because she has her own room and feels lonely.  Patient also reports it is difficult for her to ambulate around her residence due to long hallways with her walker and has chronic knee pain.  Patient reports past inpatient hospitalizations for depression but can not recall date of last inpatient hospitalization.  Patient affect is inappropriate at times during assessment as she will be bright and laughing when discussing suicidal ideations.  Patient denies homicidal ideations.  Patient denies any auditory or visual hallucinations.  Patient denies any current medical problems but reports a history of breast cancer 8 to 9 years ago and states "it was cured". Patient was brought in from her residence after she was expressing a desire to shoot her self with a gun.  Patient does not have access to a gun but endorses she does have feelings to "blow my brains out".  Patient reports she does not like her residence because she has her own room and feels lonely.  Patient also reports it is difficult for her to ambulate around her residence due to long hallways with her walker and has chronic knee pain.  Patient reports past inpatient hospitalizations for depression but can not recall date of last inpatient hospitalization.  Patient affect is inappropriate at times during assessment as she will be bright and laughing when discussing suicidal ideations.  Patient denies homicidal ideations.  Patient denies any auditory or visual hallucinations.  Patient denies any current medical problems but reports a history of breast cancer 8 to 9 years ago and states "it was cured".  Records brought with patient report dx of schizoaffective disorder and generalized anxiety disorder.  Patients last inpatient psychiatric hospitalization was at [facility] in April 2018.

## 2018-08-11 NOTE — ED BEHAVIORAL HEALTH NOTE - BEHAVIORAL HEALTH NOTE
LESLYENote: no beds available at any hospital, pt will stay overnight. SW will continue bed search tomorrow .

## 2018-08-11 NOTE — ED ADULT NURSE NOTE - NSSISCREENINGSIGNS_ED_A_ED
talking about suicide/seeking lethal means/hopelessness/social withdrawal- from friends/family/society

## 2018-08-11 NOTE — ED ADULT NURSE NOTE - CHIEF COMPLAINT QUOTE
Pt BIBA from the [facility] c/o suicidal ideation, reports she wants to " buy a gun and shoot myself." Pt states " I would also use knives if I could get them." Pt reports hx of being committed to [facility] for a long period of time, recently released and placed in the [facility]. No obvious injuries present.

## 2018-08-11 NOTE — ED BEHAVIORAL HEALTH ASSESSMENT NOTE - DESCRIPTION
Pt. calm, upset but cooperative.  [place] so difficult to answer questions correctly. None reported Lives in [place]

## 2018-08-11 NOTE — ED ADULT NURSE NOTE - INTERVENTIONS DEFINITIONS
Instruct patient to call for assistance/Reinforce activity limits and safety measures with patient and family/Stretcher in lowest position, wheels locked, appropriate side rails in place/Provide visual cue, wrist band, yellow gown, etc./Monitor gait and stability/Non-slip footwear when patient is off stretcher/Provide visual clues: red socks

## 2018-08-11 NOTE — ED ADULT NURSE NOTE - HPI (INCLUDE ILLNESS QUALITY, SEVERITY, DURATION, TIMING, CONTEXT, MODIFYING FACTORS, ASSOCIATED SIGNS AND SYMPTOMS)
Patient reports depression has been a 10 on scale of 1 to 10 with 10 the worst since going to this residence and recently developed suicidal ideations with plan to shoot herself.

## 2018-08-11 NOTE — ED BEHAVIORAL HEALTH ASSESSMENT NOTE - PAST PSYCHOTROPIC MEDICATION
As above Daughter reports previous Rx prescribed by psychiatrist in [place]:   8AM:  Zoloft 100mg; Ativan 0.5mg; Letrozole 2.5mg  9:30PM: Zyprexa (MOODY) 15mg; Ativan 1mg; Trazadone 50mg

## 2018-08-11 NOTE — ED BEHAVIORAL HEALTH ASSESSMENT NOTE - DETAILS
[facility] Shot herself with a gun or find a knife to kill herself Generic Zyprexa/Olanzapine - hives Upon selection of hospital

## 2018-08-11 NOTE — ED PROVIDER NOTE - OBJECTIVE STATEMENT
76 y/o F pt with PMHx schizophrenia presents to the ED BIBA from [facility] c/o suicidal ideations.  She states her plan to killer herself is going to buy a gun or using knives if she had access to them.  She states she is depressed and anxious at her baseline.  She has trouble sleeping during the nighttime and daytime.  Notes her bladder always feels full, but [facility] says she urinates normally.

## 2018-08-11 NOTE — ED ADULT TRIAGE NOTE - ARRIVAL FROM
the [facility] living [facility]

## 2018-08-11 NOTE — ED BEHAVIORAL HEALTH NOTE - BEHAVIORAL HEALTH NOTE
SWNote: pt seen by psych NP(Ester) found to benefit from inpatient hospitalization. Worker called [facility] ([facility]) no bed available at this moment. Worker will continue bed search. SW to follow.

## 2018-08-11 NOTE — ED BEHAVIORAL HEALTH ASSESSMENT NOTE - HPI (INCLUDE ILLNESS QUALITY, SEVERITY, DURATION, TIMING, CONTEXT, MODIFYING FACTORS, ASSOCIATED SIGNS AND SYMPTOMS)
Pt. is a 78 yo female Pt. is a 78 yo female BIBA from [facility] after she called the  to tell them she was going to kill herself.  Pt. reports long hx of mental illness since 1963. States she was living in [place] with her  who was in the  - when he was shipped out she had a mental breakdown and spent many months in [facility]. Pt. is a 76 yo female BIBA from [facility] after she called the  to tell them she was going to kill herself.  Pt. reports long hx of mental illness since 1963. States she was living in [place] with her  who was in the  - when he was shipped out she had a mental breakdown and spent many months in [facility].  She repeatedly states she wants to be admitted to a psychiatric facility and "stay there" for the rest of her life.  She reports she was most recently hospitalized at [facility] and would like to live there if possible.  She had lived with her daughter and son-in-law who kicked her out and she was sent to the [facility].  States she hates the [facility] - the hallway is too long for her to walk, she doesn't know anyone and she stays in her room and sleeps day and night.  States she wants to kill herself - she will buy a gun and shoot herself of find knives to kill herself.  Does reports she has hx of auditory and visual hallucinations but not since she started taking medications. Pt. is a 78 yo female BIBA from [facility] after she called the  to tell them she was going to kill herself.  Pt. reports long hx of mental illness since 1963. States she was living in [place] with her  who was in the  - when he was shipped out she had a mental breakdown and spent many months in [facility].  She repeatedly states she wants to be admitted to a psychiatric facility and "stay there" for the rest of her life.  She reports she was most recently hospitalized at [facility] and would like to live there if possible.  She had lived with her daughter and son-in-law who kicked her out and she was sent to the [facility].  States she hates the [facility] - the hallway is too long for her to walk, she doesn't know anyone and she stays in her room and sleeps day and night.  States she wants to kill herself - she will buy a gun and shoot herself or find knives to kill herself.  Does reports she has hx of auditory and visual hallucinations but not since she started taking medications.  Denies homicidal ideation/plan/intent.  Denies paranoia, delusions, mood lability.  Denies any substance use/abuse.  Reports she has two daughters and a son but she doesn't see them often.  Pt. requires inpatient hospitalization for stabilization of her medications. Pt. is a 78 yo female BIBA from [facility] after she called the  to tell them she was going to kill herself.  Pt. reports long hx of mental illness since 1963. States she was living in [place] with her  who was in the  - when he was shipped out she had a mental breakdown and spent many months in [facility].  She repeatedly states she wants to be admitted to a psychiatric facility and "stay there" for the rest of her life.  She reports she was most recently hospitalized at [facility] and would like to live there if possible.  She had lived with her daughter and son-in-law who has a neurological illness and her daughter was unable to care for both her  and the patient; and the patient was sent to the [facility].  States she hates the [facility] - the hallway is too long for her to walk, she doesn't know anyone and she stays in her room and sleeps day and night.  States she wants to kill herself - she will buy a gun and shoot herself or find knives to kill herself.  Does reports she has hx of auditory and visual hallucinations but not since she started taking medications.  Denies homicidal ideation/plan/intent.  Denies paranoia, delusions, mood lability.  Denies any substance use/abuse.  Reports she has two daughters and a son but she doesn't see them often-daughter reports she visits weekly.  Pt. requires inpatient hospitalization for stabilization of her medications.

## 2018-08-11 NOTE — ED BEHAVIORAL HEALTH ASSESSMENT NOTE - SUICIDE RISK FACTORS
Hopelessness/Highly impulsive behavior/Access to means (pills, firearms, etc.)/Mood episode/Perceived burden on family and others/Unable to engage in safety planning

## 2018-08-11 NOTE — ED ADULT TRIAGE NOTE - CHIEF COMPLAINT QUOTE
Pt BIBA from the [facility] c/o suicidal ideation, reports she wants to " buy a gun and shoot myself." Pt states " I would also use knives if I could get them." Pt reports hx of being committed to [facility] for a long period of time, recently released and placed in the [facility]. No obvious injuries present.

## 2018-08-12 PROCEDURE — 93010 ELECTROCARDIOGRAM REPORT: CPT

## 2018-08-12 RX ORDER — TRAZODONE HCL 50 MG
50 TABLET ORAL AT BEDTIME
Qty: 0 | Refills: 0 | Status: DISCONTINUED | OUTPATIENT
Start: 2018-08-12 | End: 2018-08-16

## 2018-08-12 RX ADMIN — Medication 1 MILLIGRAM(S): at 12:32

## 2018-08-12 RX ADMIN — Medication 0.5 MILLIGRAM(S): at 10:46

## 2018-08-12 RX ADMIN — Medication 1 MILLIGRAM(S): at 22:15

## 2018-08-12 RX ADMIN — SERTRALINE 100 MILLIGRAM(S): 25 TABLET, FILM COATED ORAL at 06:58

## 2018-08-12 RX ADMIN — Medication 1 MILLIGRAM(S): at 22:16

## 2018-08-12 RX ADMIN — Medication 1 TABLET(S): at 09:00

## 2018-08-12 RX ADMIN — Medication 1 TABLET(S): at 22:15

## 2018-08-12 RX ADMIN — Medication 50 MILLIGRAM(S): at 22:16

## 2018-08-12 NOTE — ED ADULT NURSE REASSESSMENT NOTE - COMFORT CARE
meal provided
assisted to bathroom/ambulated to bathroom/plan of care explained/meal provided
ambulated to bathroom/meal provided
ambulated to bathroom/plan of care explained
meal provided/plan of care explained

## 2018-08-13 PROCEDURE — 99211 OFF/OP EST MAY X REQ PHY/QHP: CPT

## 2018-08-13 PROCEDURE — 99213 OFFICE O/P EST LOW 20 MIN: CPT

## 2018-08-13 PROCEDURE — 70450 CT HEAD/BRAIN W/O DYE: CPT | Mod: 26

## 2018-08-13 RX ORDER — LETROZOLE 2.5 MG/1
2.5 TABLET, FILM COATED ORAL DAILY
Qty: 0 | Refills: 0 | Status: DISCONTINUED | OUTPATIENT
Start: 2018-08-13 | End: 2018-08-16

## 2018-08-13 RX ORDER — ACETAMINOPHEN 500 MG
650 TABLET ORAL ONCE
Qty: 0 | Refills: 0 | Status: COMPLETED | OUTPATIENT
Start: 2018-08-13 | End: 2018-08-13

## 2018-08-13 RX ADMIN — Medication 1 TABLET(S): at 13:34

## 2018-08-13 RX ADMIN — Medication 1 TABLET(S): at 17:00

## 2018-08-13 RX ADMIN — Medication 1 MILLIGRAM(S): at 13:34

## 2018-08-13 RX ADMIN — SERTRALINE 100 MILLIGRAM(S): 25 TABLET, FILM COATED ORAL at 12:07

## 2018-08-13 RX ADMIN — Medication 650 MILLIGRAM(S): at 18:30

## 2018-08-13 RX ADMIN — Medication 1 MILLIGRAM(S): at 06:44

## 2018-08-13 RX ADMIN — Medication 50 MILLIGRAM(S): at 22:05

## 2018-08-13 RX ADMIN — Medication 1 MILLIGRAM(S): at 22:05

## 2018-08-13 RX ADMIN — LETROZOLE 2.5 MILLIGRAM(S): 2.5 TABLET, FILM COATED ORAL at 13:34

## 2018-08-13 RX ADMIN — Medication 650 MILLIGRAM(S): at 17:00

## 2018-08-13 NOTE — ED BEHAVIORAL HEALTH NOTE - BEHAVIORAL HEALTH NOTE
Social Work Note: as per MD patient would benefit from inpatient psychiatric admission at this time. Clinicals were sent to [facility] for review, spoke with Anel in Admissions. As per Anel, no beds available today at [facility] will review again in AM. This writer placed call multiple hospitals, all with no beds available. sw will continue to follow.

## 2018-08-13 NOTE — ED BEHAVIORAL HEALTH NOTE - BEHAVIORAL HEALTH NOTE
PROGRESS NOTE: 18 @ 09:23  	  • Reason for Ongoing Consultation: 	suicidal threats No geripsych beds at [facility] held for transfer    ID: [id number] Female with HEALTH ISSUES - PROBLEM Dx:  Schizoaffective disorder, bipolar type  medical: UTI non Bactrim  H/o Breast cancer - 10 yrs ago Tx with radiation and on maintenance Letrozole (Femara)  Arthritis - use walker to ambulate        INTERVAL DATA:   • Interval Chief Complaint:  Very anxious , depressed lonely History obtained by PATEL Norman NP reviewed with patient Who is in agreement with plan for admission to inpatient unit  • Interval History: cooperative with care    REVIEW OF SYSTEMS:   • Constitutional Symptoms	No complaints  • Eyes	No complaints  • Ears / Nose / Throat / Mouth	No complaints  • Cardiovascular	No complaints  • Respiratory	No complaints  • Gastrointestinal	No complaints  • Genitourinary	frequency urgency  • Musculoskeletal	joint aches  • Skin	No complaints  • Neurological	No complaints  • Psychiatric (see HPI)	See HPI  • Endocrine	No complaints  • Hematologic / Lymphatic	No complaints  • Allergic / Immunologic	No complaints    REVIEW OF VITALS/LABS/IMAGING/INVESTIGATIONS:   • Vital signs reviewed: Yes  • Vital Signs:	    T(C): 37 (18 @ 08:03), Max: 37 (18 @ 08:03)  HR: 88 (18 @ 08:03) (71 - 88)  BP: 114/66 (18 @ 08:03) (103/52 - 140/56)  RR: 20 (18 @ 08:03) (17 - 20)  SpO2: 96% (18 @ 08:03) (95% - 98%)    • Available labs reviewed: Yes  • Available Lab Results:                           10.2   6.1   )-----------( 189      ( 11 Aug 2018 15:33 )             31.6     -    140  |  103  |  22.0<H>  ----------------------------<  106  4.4   |  24.0  |  0.93    Ca    9.0      11 Aug 2018 15:33          Urinalysis Basic - ( 11 Aug 2018 17:34 )    Color: Yellow / Appearance: Clear / S.010 / pH: x  Gluc: x / Ketone: Negative  / Bili: Negative / Urobili: Negative mg/dL   Blood: x / Protein: Negative mg/dL / Nitrite: Negative   Leuk Esterase: Moderate / RBC: 0-2 /HPF / WBC 6-10   Sq Epi: x / Non Sq Epi: Few / Bacteria: Occasional          MEDICATIONS:      PRN Medications:  • PRN Medications since last evaluation	  • PRN Details	    Current Medications:   letrozole 2.5 milliGRAM(s) Oral daily  Lorazepam     Tablet 1 milliGRAM(s) Oral three times a day  sertraline 100 milliGRAM(s) Oral daily  trazodone 50 milliGRAM(s) Oral at bedtime  trimethoprim  160 mG/sulfamethoxazole 800 mG 1 Tablet(s) Oral two times a day     Medication Side Effects:  • Medication Side Effects or Adverse Reactions (new or ongoing)	None known    MENTAL STATUS EXAM:   • Level of Consciousness	Alert  • General Appearance	Well developed  • Body Habitus	Well nourished  • Hygiene	fair  • Grooming	fair  • Behavior	Cooperative  • Eye Contact	Good  • Relatedness	Good  • Impulse Control	Normal  • Muscle Tone / Strength	not assessed  • Abnormal Movements	No abnormal movements  • Gait / Station	using walker  • Speech Volume	Normal  • Speech Rate	Normal  • Speech Spontaneity	Normal  • Speech Articulation	Normal  • Mood	depressed  • Affect Quality	anxious  • Affect Range	constricted  • Affect Congruence	Congruent  • Thought Process	circumstantial perseverative  • Thought Associations	Normal  • Thought Content	hopelessness suicidal with urges to stab or cut self  • Perceptions	No abnormalities  • Oriented to Time	Yes  • Oriented to Place	Yes  • Oriented to Situation	Yes  • Oriented to Person	Yes  • Attention / Concentration	Normal  • Estimated Intelligence	Average  • Recent Memory	Normal  • Remote Memory	Normal  • Fund of Knowledge	Normal  • Language	No abnormalities noted  • Judgment (regarding everyday events)	Fair  • Insight (regarding psychiatric illness)	Fair    SUICIDALITY:   • Suicidality (Interval)	not on 1  to 1 here No self injurious behaviors here    HOMICIDALITY/AGGRESSION:   • Homicidality/Aggression	none known    DIAGNOSIS DSM-V:    Psychiatric Diagnosis (Corresponds to DSM-IV Axis I, II):   HEALTH ISSUES - PROBLEM Dx:  Schizoaffective disorder, bipolar type           Medical Diagnosis (Corresponds to DSM-IV Axis III):  • Axis III	    UTI- Bactrim  Breast cancer in remission - Letrozole  ASSESSMENT OF CURRENT CONDITION:   Summary (include case differential, formulation and patient response to therapy): No interval change     Risk Assessment (consider static vs modifiable risk factors and protective factors; comment on level of risk for dangerous behavior): elevated risk of suicidal behaviors _long history of mental illness high anxiety past hospitalizations limited coping skills    PLAN 9.37 application for inpatient admission  SW to pursue bed for transfer

## 2018-08-14 ENCOUNTER — INPATIENT (INPATIENT)
Facility: HOSPITAL | Age: 78
LOS: 49 days | Discharge: HOME | End: 2018-10-03
Attending: PSYCHIATRY & NEUROLOGY | Admitting: PSYCHIATRY & NEUROLOGY
Payer: MEDICARE

## 2018-08-14 VITALS
RESPIRATION RATE: 20 BRPM | HEART RATE: 84 BPM | OXYGEN SATURATION: 96 % | SYSTOLIC BLOOD PRESSURE: 113 MMHG | DIASTOLIC BLOOD PRESSURE: 61 MMHG | TEMPERATURE: 99 F

## 2018-08-14 VITALS — TEMPERATURE: 99 F | WEIGHT: 130.07 LBS | RESPIRATION RATE: 16 BRPM | HEIGHT: 60 IN

## 2018-08-14 DIAGNOSIS — F20.9 SCHIZOPHRENIA, UNSPECIFIED: ICD-10-CM

## 2018-08-14 DIAGNOSIS — F25.9 SCHIZOAFFECTIVE DISORDER, UNSPECIFIED: ICD-10-CM

## 2018-08-14 PROCEDURE — 81001 URINALYSIS AUTO W/SCOPE: CPT

## 2018-08-14 PROCEDURE — 90792 PSYCH DIAG EVAL W/MED SRVCS: CPT

## 2018-08-14 PROCEDURE — 99211 OFF/OP EST MAY X REQ PHY/QHP: CPT

## 2018-08-14 PROCEDURE — 80307 DRUG TEST PRSMV CHEM ANLYZR: CPT

## 2018-08-14 PROCEDURE — 36415 COLL VENOUS BLD VENIPUNCTURE: CPT

## 2018-08-14 PROCEDURE — 80048 BASIC METABOLIC PNL TOTAL CA: CPT

## 2018-08-14 PROCEDURE — 70450 CT HEAD/BRAIN W/O DYE: CPT

## 2018-08-14 PROCEDURE — 93005 ELECTROCARDIOGRAM TRACING: CPT

## 2018-08-14 PROCEDURE — 99285 EMERGENCY DEPT VISIT HI MDM: CPT

## 2018-08-14 PROCEDURE — 85027 COMPLETE CBC AUTOMATED: CPT

## 2018-08-14 PROCEDURE — 99213 OFFICE O/P EST LOW 20 MIN: CPT

## 2018-08-14 RX ORDER — OLANZAPINE 15 MG/1
15 TABLET, FILM COATED ORAL AT BEDTIME
Qty: 0 | Refills: 0 | Status: DISCONTINUED | OUTPATIENT
Start: 2018-08-14 | End: 2018-08-14

## 2018-08-14 RX ORDER — LETROZOLE 2.5 MG/1
2.5 TABLET, FILM COATED ORAL DAILY
Qty: 0 | Refills: 0 | Status: DISCONTINUED | OUTPATIENT
Start: 2018-08-14 | End: 2018-10-03

## 2018-08-14 RX ORDER — SERTRALINE 25 MG/1
100 TABLET, FILM COATED ORAL DAILY
Qty: 0 | Refills: 0 | Status: DISCONTINUED | OUTPATIENT
Start: 2018-08-14 | End: 2018-08-16

## 2018-08-14 RX ORDER — TRAZODONE HCL 50 MG
50 TABLET ORAL AT BEDTIME
Qty: 0 | Refills: 0 | Status: DISCONTINUED | OUTPATIENT
Start: 2018-08-14 | End: 2018-10-03

## 2018-08-14 RX ORDER — LANOLIN ALCOHOL/MO/W.PET/CERES
3 CREAM (GRAM) TOPICAL AT BEDTIME
Qty: 0 | Refills: 0 | Status: DISCONTINUED | OUTPATIENT
Start: 2018-08-14 | End: 2018-10-03

## 2018-08-14 RX ORDER — QUETIAPINE FUMARATE 200 MG/1
100 TABLET, FILM COATED ORAL AT BEDTIME
Qty: 0 | Refills: 0 | Status: DISCONTINUED | OUTPATIENT
Start: 2018-08-14 | End: 2018-10-03

## 2018-08-14 RX ADMIN — Medication 1 MILLIGRAM(S): at 13:01

## 2018-08-14 RX ADMIN — LETROZOLE 2.5 MILLIGRAM(S): 2.5 TABLET, FILM COATED ORAL at 13:04

## 2018-08-14 RX ADMIN — QUETIAPINE FUMARATE 100 MILLIGRAM(S): 200 TABLET, FILM COATED ORAL at 21:13

## 2018-08-14 RX ADMIN — Medication 1 MILLIGRAM(S): at 06:16

## 2018-08-14 RX ADMIN — SERTRALINE 100 MILLIGRAM(S): 25 TABLET, FILM COATED ORAL at 13:01

## 2018-08-14 RX ADMIN — Medication 1 TABLET(S): at 06:16

## 2018-08-14 RX ADMIN — Medication 1 MILLIGRAM(S): at 21:12

## 2018-08-14 RX ADMIN — Medication 50 MILLIGRAM(S): at 21:13

## 2018-08-14 NOTE — ED ADULT NURSE REASSESSMENT NOTE - NS ED NURSE REASSESS COMMENT FT1
EKG done pt OOB x2 with assistance to bathroom, voided.  Will continue to monitor and maintain safety.
Patient had received Ativan 0.5mg PO at 1046 for anxiety complaints and is currently resting in bed with no complaints.
Pt OOB to bathroom X2, called MD, meds to be ordered, pt feels urgency.  Will continue to monitor and maintain safety.
Pt OOB to bathroom with assistance, in no acute distress at this time.  Will continue to monitor and maintain safety.
Pt OOB to bathroom with assistance, pt in no acute distress at this present time.  Will continue to monitor and maintain safety.
Pt OOB to bathroom x2 times with assistance, no incidents to report at this time, medication for UTI given, pt in no acute distress at this time.
Pt calm and cooperative at this time OOB to bathroom with assistance, no incidents to report, safety maintained.  Will continue to monitor and maintain safety.  Pt in no acute distress at this present time.
Pt currently resting OOBx1 to bathroom with assistance with walker and staff for safety, no incidents to report, pt in good spirits.  pt in no acute distress.  Will continue to monitor and maintain safety.
Pt currently resting in no acute distress at this present time.  Pt able to make needs known , pt will stay overnight no beds at this time for in patient, Awaiting EKG to be done.  Will continue to monitor and maintain safety.
Pt currently sleeping in no acute distress at this present time, awaiting EKG. Will continue to monitor and maintain safety.
pt awake alert and oriented x3, ambulated with walker approx 5x during night to use rest room, pt continent of urine during night, pt requested adult hygiene under garments. pt medicated
pt currently resting, OOB to bathroom with assistance, pt in no acute distress, pt in good spirits at this time.  Will continue to monitor and maintain safety.
pt in bed awake able to express thoughts and feelings , in no acute distress at this present time.  Will continue to monitor and maintain safety.
pt received sleeping, resp unlabored with no acute resp distress observed, pt has a quad walker at bedside, will monitor for pt safety.
pt was sleeping, ambulated to restroom with walker, no incontinence noted on sheets, pt returned to bed and is now sleeping. will continue to monitor for pt safety.
patient ate 100% of her Lunch, stated she is anxious, medicated as per directed.
Patient awake verbalizing feelings of anxiety.  Patient ambulated to bathroom several times with report she had to void which she did not do twice out of four times.  Patient 100% of her breakfast with peers at table.  Patient needs assistance ambulating, gait is slow and steady with fall precautions in place.  Patient assisted with showering.  No attempts to harm self or others.
Assumed care of patient at 0710.  Patient utilizing call bell for assistance, ambulated to bathroom with staff and voided and had a bowel movement without difficultly.  No attempts to harm self or others and safety maintained.  Fall precautions in place.  Patient educated about plan of care and verbalized understanding of same.
Patient ambulating with use of walker with slow steady gait.  Fall precautions in place with no falls.  Patient family dropped off list of past medications and report they will bring her personal walker to hospital she is transferred to.  Patient verbalizing intermittent episodes of anxiety but calmer after verbal interventions.  No attempts to harm self or others and safety maintained.
Patient assisted by one to bathroom and voided moderate amount of urine twice.  Fall precautions in place with no falls.  Patient ate 100% of her dinner independently.  No attempts to harm her self or others and safety maintained.

## 2018-08-14 NOTE — ED BEHAVIORAL HEALTH NOTE - BEHAVIORAL HEALTH NOTE
Social Work Note: clinicals were reviewed and patient has been accepted at [facility] at this time, confirmed with Josee in Central Intake. Accepting is Dr. Dunlap patient will be going to [facility] 5 9.37. [facility] scheduled for transportation spoke with Chip

## 2018-08-14 NOTE — CHART NOTE - NSCHARTNOTEFT_GEN_A_CORE
Screening Medical Evaluation  Patient Admitted from: [facility] ED    [facility] admitting diagnosis: Schizoaffective disorder    PAST MEDICAL & SURGICAL HISTORY:  Schizophrenia  Breast cancer  No significant past surgical history        Allergies    Keppra (Rash)  PT ALLERGIC TO THE GENERIC OF ZYPREXA (Rash)    Intolerances        Social History:     FAMILY HISTORY:  No pertinent family history in first degree relatives      MEDICATIONS  (STANDING):  letrozole 2.5 milliGRAM(s) Oral daily  LORazepam     Tablet 0.5 milliGRAM(s) Oral daily  LORazepam     Tablet 1 milliGRAM(s) Oral at bedtime  QUEtiapine 100 milliGRAM(s) Oral at bedtime  sertraline 100 milliGRAM(s) Oral daily  traZODone 50 milliGRAM(s) Oral at bedtime    MEDICATIONS  (PRN):  LORazepam     Tablet 0.5 milliGRAM(s) Oral every 4 hours PRN Anxiety  LORazepam   Injectable 0.5 milliGRAM(s) IntraMuscular once PRN Agitation      Vital Signs Last 24 Hrs  T(C): 37.2 (14 Aug 2018 16:10), Max: 37.2 (14 Aug 2018 07:52)  T(F): 99 (14 Aug 2018 16:10), Max: 99 (14 Aug 2018 07:52)  HR: 84 (14 Aug 2018 11:14) (76 - 92)  BP: 113/61 (14 Aug 2018 11:14) (113/61 - 136/77)  BP(mean): --  RR: 16 (14 Aug 2018 16:10) (16 - 20)  SpO2: 96% (14 Aug 2018 11:14) (95% - 98%)  CAPILLARY BLOOD GLUCOSE            PHYSICAL EXAM:  GENERAL: NAD, well-developed  HEAD:  Atraumatic, Normocephalic  EYES: EOMI, PERRLA, conjunctiva and sclera clear  NECK: Supple, No JVD  CHEST/LUNG: Clear to auscultation bilaterally; No wheeze  HEART: Regular rate and rhythm; No murmurs, rubs, or gallops  ABDOMEN: Soft, Nontender, Nondistended; Bowel sounds present  EXTREMITIES:  2+ Peripheral Pulses, No clubbing, cyanosis, or edema  PSYCH: AAOx3  NEUROLOGY: non-focal  SKIN:     LABS:                    RADIOLOGY & ADDITIONAL TESTS:    Assessment and Plan: 76 yo F with PMH of breast cancer is admitted to [facility] with a primary psychiatric diagnosis of Schizoaffective disorder. The pt currently denies having any medical complaints such as chest pain, sob, abdominal pain, n/v/d/c, or any problems with urination or bowel movements. The rest of her screening physical is unremarkable.    1.Schizoaffective disorder-Plan: continue with meds as per primary psychiatric team Screening Medical Evaluation  Patient Admitted from: [facility] ED    [facility] admitting diagnosis: Schizoaffective disorder    PAST MEDICAL & SURGICAL HISTORY:  Schizophrenia  Breast cancer  No significant past surgical history        Allergies    Keppra (Rash)  PT ALLERGIC TO THE GENERIC OF ZYPREXA (Rash)    Intolerances        Social History:     FAMILY HISTORY:  No pertinent family history in first degree relatives      MEDICATIONS  (STANDING):  letrozole 2.5 milliGRAM(s) Oral daily  LORazepam     Tablet 0.5 milliGRAM(s) Oral daily  LORazepam     Tablet 1 milliGRAM(s) Oral at bedtime  QUEtiapine 100 milliGRAM(s) Oral at bedtime  sertraline 100 milliGRAM(s) Oral daily  traZODone 50 milliGRAM(s) Oral at bedtime    MEDICATIONS  (PRN):  LORazepam     Tablet 0.5 milliGRAM(s) Oral every 4 hours PRN Anxiety  LORazepam   Injectable 0.5 milliGRAM(s) IntraMuscular once PRN Agitation      Vital Signs Last 24 Hrs  T(C): 37.2 (14 Aug 2018 16:10), Max: 37.2 (14 Aug 2018 07:52)  T(F): 99 (14 Aug 2018 16:10), Max: 99 (14 Aug 2018 07:52)  HR: 84 (14 Aug 2018 11:14) (76 - 92)  BP: 113/61 (14 Aug 2018 11:14) (113/61 - 136/77)  BP(mean): --  RR: 16 (14 Aug 2018 16:10) (16 - 20)  SpO2: 96% (14 Aug 2018 11:14) (95% - 98%)  CAPILLARY BLOOD GLUCOSE            PHYSICAL EXAM:  GENERAL: NAD, well-developed  HEAD:  Atraumatic, Normocephalic  EYES: EOMI, PERRLA, conjunctiva and sclera clear  NECK: Supple, No JVD  CHEST/LUNG: Clear to auscultation bilaterally; No wheeze  HEART: Regular rate and rhythm; No murmurs, rubs, or gallops  ABDOMEN: Soft, Nontender, Nondistended; Bowel sounds present  EXTREMITIES:  2+ Peripheral Pulses, No clubbing, cyanosis, or edema  PSYCH: AAOx3  NEUROLOGY: non-focal  SKIN: In tact    LABS:                    RADIOLOGY & ADDITIONAL TESTS:    Assessment and Plan: 76 yo F with PMH of breast cancer is admitted to [facility] with a primary psychiatric diagnosis of Schizoaffective disorder. The pt currently denies having any medical complaints such as chest pain, sob, abdominal pain, n/v/d/c, or any problems with urination or bowel movements. The rest of her screening physical is unremarkable.    1.Schizoaffective disorder-Plan: continue with meds as per primary psychiatric team

## 2018-08-14 NOTE — PROGRESS NOTE ADULT - SUBJECTIVE AND OBJECTIVE BOX
Chart reviewed and patient seen at bedside. Pt c/o increase urinary urgency/frequency. Pt states " they won't take me back there because I said I was going to kill myself". Pt offers no further complaints.  ROS: Urinary urgency and frequency. rest of ros negative  T(C): 37.2 (14 Aug 2018 07:52), Max: 37.2 (14 Aug 2018 07:52)  T(F): 99 (14 Aug 2018 07:52), Max: 99 (14 Aug 2018 07:52)  HR: 76 (14 Aug 2018 07:52) (76 - 92)  BP: 117/68 (14 Aug 2018 07:52) (117/68 - 136/77)  RR: 18 (14 Aug 2018 07:52) (18 - 20)  SpO2: 95% (14 Aug 2018 07:52) (95% - 98%)  MEDICATIONS  (STANDING):  letrozole 2.5 milliGRAM(s) Oral daily  LORazepam     Tablet 1 milliGRAM(s) Oral three times a day  sertraline 100 milliGRAM(s) Oral daily  traZODone 50 milliGRAM(s) Oral at bedtime  trimethoprim  160 mG/sulfamethoxazole 800 mG 1 Tablet(s) Oral two times a day    PAST MEDICAL & SURGICAL HISTORY:  Schizophrenia  Breast cancer  No significant past surgical history    MSE: Alert and oriented. Attention and concentration unremarkable. Speech rate/volume/amount unremarkable. Affect is depressed. Thought content is hopelessness. Though process is illogical. recent/remote memory unremarkable. insight and judgment poor. no perceptual disturbances elicited.

## 2018-08-14 NOTE — PROGRESS NOTE ADULT - ASSESSMENT
Chronic schizophrenia - depressed with SI, awaiting placement at [facility] of breast cancer- maintenance therapy

## 2018-08-15 LAB
ALBUMIN SERPL ELPH-MCNC: 4.3 G/DL — SIGNIFICANT CHANGE UP (ref 3.3–5)
ALP SERPL-CCNC: 117 U/L — SIGNIFICANT CHANGE UP (ref 40–120)
ALT FLD-CCNC: 21 U/L — SIGNIFICANT CHANGE UP (ref 4–33)
AST SERPL-CCNC: 26 U/L — SIGNIFICANT CHANGE UP (ref 4–32)
BASOPHILS # BLD AUTO: 0.03 K/UL — SIGNIFICANT CHANGE UP (ref 0–0.2)
BASOPHILS NFR BLD AUTO: 0.6 % — SIGNIFICANT CHANGE UP (ref 0–2)
BILIRUB SERPL-MCNC: 0.4 MG/DL — SIGNIFICANT CHANGE UP (ref 0.2–1.2)
BUN SERPL-MCNC: 43 MG/DL — HIGH (ref 7–23)
CALCIUM SERPL-MCNC: 9.4 MG/DL — SIGNIFICANT CHANGE UP (ref 8.4–10.5)
CHLORIDE SERPL-SCNC: 102 MMOL/L — SIGNIFICANT CHANGE UP (ref 98–107)
CO2 SERPL-SCNC: 21 MMOL/L — LOW (ref 22–31)
CREAT SERPL-MCNC: 1.24 MG/DL — SIGNIFICANT CHANGE UP (ref 0.5–1.3)
EOSINOPHIL # BLD AUTO: 0.08 K/UL — SIGNIFICANT CHANGE UP (ref 0–0.5)
EOSINOPHIL NFR BLD AUTO: 1.6 % — SIGNIFICANT CHANGE UP (ref 0–6)
GLUCOSE SERPL-MCNC: 111 MG/DL — HIGH (ref 70–99)
HCT VFR BLD CALC: 34.7 % — SIGNIFICANT CHANGE UP (ref 34.5–45)
HGB BLD-MCNC: 11.4 G/DL — LOW (ref 11.5–15.5)
IMM GRANULOCYTES # BLD AUTO: 0.03 # — SIGNIFICANT CHANGE UP
IMM GRANULOCYTES NFR BLD AUTO: 0.6 % — SIGNIFICANT CHANGE UP (ref 0–1.5)
LYMPHOCYTES # BLD AUTO: 0.7 K/UL — LOW (ref 1–3.3)
LYMPHOCYTES # BLD AUTO: 14.2 % — SIGNIFICANT CHANGE UP (ref 13–44)
MCHC RBC-ENTMCNC: 29.2 PG — SIGNIFICANT CHANGE UP (ref 27–34)
MCHC RBC-ENTMCNC: 32.9 % — SIGNIFICANT CHANGE UP (ref 32–36)
MCV RBC AUTO: 89 FL — SIGNIFICANT CHANGE UP (ref 80–100)
MONOCYTES # BLD AUTO: 0.54 K/UL — SIGNIFICANT CHANGE UP (ref 0–0.9)
MONOCYTES NFR BLD AUTO: 10.9 % — SIGNIFICANT CHANGE UP (ref 2–14)
NEUTROPHILS # BLD AUTO: 3.56 K/UL — SIGNIFICANT CHANGE UP (ref 1.8–7.4)
NEUTROPHILS NFR BLD AUTO: 72.1 % — SIGNIFICANT CHANGE UP (ref 43–77)
NRBC # FLD: 0 — SIGNIFICANT CHANGE UP
PLATELET # BLD AUTO: 187 K/UL — SIGNIFICANT CHANGE UP (ref 150–400)
PMV BLD: 10.1 FL — SIGNIFICANT CHANGE UP (ref 7–13)
POTASSIUM SERPL-MCNC: 4.9 MMOL/L — SIGNIFICANT CHANGE UP (ref 3.5–5.3)
POTASSIUM SERPL-SCNC: 4.9 MMOL/L — SIGNIFICANT CHANGE UP (ref 3.5–5.3)
PROT SERPL-MCNC: 7.4 G/DL — SIGNIFICANT CHANGE UP (ref 6–8.3)
RBC # BLD: 3.9 M/UL — SIGNIFICANT CHANGE UP (ref 3.8–5.2)
RBC # FLD: 13.5 % — SIGNIFICANT CHANGE UP (ref 10.3–14.5)
SODIUM SERPL-SCNC: 139 MMOL/L — SIGNIFICANT CHANGE UP (ref 135–145)
TSH SERPL-MCNC: 1.09 UIU/ML — SIGNIFICANT CHANGE UP (ref 0.27–4.2)
WBC # BLD: 4.94 K/UL — SIGNIFICANT CHANGE UP (ref 3.8–10.5)
WBC # FLD AUTO: 4.94 K/UL — SIGNIFICANT CHANGE UP (ref 3.8–10.5)

## 2018-08-15 PROCEDURE — 99232 SBSQ HOSP IP/OBS MODERATE 35: CPT

## 2018-08-15 RX ORDER — ACETAMINOPHEN 500 MG
650 TABLET ORAL EVERY 6 HOURS
Qty: 0 | Refills: 0 | Status: DISCONTINUED | OUTPATIENT
Start: 2018-08-15 | End: 2018-09-13

## 2018-08-15 RX ADMIN — Medication 650 MILLIGRAM(S): at 12:25

## 2018-08-15 RX ADMIN — Medication 650 MILLIGRAM(S): at 13:20

## 2018-08-15 RX ADMIN — SERTRALINE 100 MILLIGRAM(S): 25 TABLET, FILM COATED ORAL at 09:33

## 2018-08-15 RX ADMIN — Medication 50 MILLIGRAM(S): at 21:22

## 2018-08-15 RX ADMIN — Medication 1 MILLIGRAM(S): at 21:22

## 2018-08-15 RX ADMIN — Medication 3 MILLIGRAM(S): at 21:22

## 2018-08-15 RX ADMIN — QUETIAPINE FUMARATE 100 MILLIGRAM(S): 200 TABLET, FILM COATED ORAL at 21:22

## 2018-08-15 RX ADMIN — Medication 0.5 MILLIGRAM(S): at 09:33

## 2018-08-15 RX ADMIN — LETROZOLE 2.5 MILLIGRAM(S): 2.5 TABLET, FILM COATED ORAL at 09:33

## 2018-08-16 LAB
APPEARANCE UR: CLEAR — SIGNIFICANT CHANGE UP
BACTERIA # UR AUTO: NEGATIVE — SIGNIFICANT CHANGE UP
BILIRUB UR-MCNC: NEGATIVE — SIGNIFICANT CHANGE UP
BLOOD UR QL VISUAL: NEGATIVE — SIGNIFICANT CHANGE UP
COLOR SPEC: YELLOW — SIGNIFICANT CHANGE UP
GLUCOSE UR-MCNC: NEGATIVE — SIGNIFICANT CHANGE UP
KETONES UR-MCNC: NEGATIVE — SIGNIFICANT CHANGE UP
LEUKOCYTE ESTERASE UR-ACNC: SIGNIFICANT CHANGE UP
NITRITE UR-MCNC: NEGATIVE — SIGNIFICANT CHANGE UP
PH UR: 6 — SIGNIFICANT CHANGE UP (ref 5–8)
PROT UR-MCNC: NEGATIVE — SIGNIFICANT CHANGE UP
RBC CASTS # UR COMP ASSIST: HIGH
SP GR SPEC: 1.02 — SIGNIFICANT CHANGE UP (ref 1–1.04)
SQUAMOUS # UR AUTO: SIGNIFICANT CHANGE UP
UROBILINOGEN FLD QL: NORMAL — SIGNIFICANT CHANGE UP
WBC CASTS UR QL COMP ASSIST: NEGATIVE — SIGNIFICANT CHANGE UP
WBC UR QL: HIGH

## 2018-08-16 PROCEDURE — 99232 SBSQ HOSP IP/OBS MODERATE 35: CPT

## 2018-08-16 RX ORDER — SERTRALINE 25 MG/1
150 TABLET, FILM COATED ORAL DAILY
Qty: 0 | Refills: 0 | Status: DISCONTINUED | OUTPATIENT
Start: 2018-08-16 | End: 2018-08-21

## 2018-08-16 RX ADMIN — Medication 50 MILLIGRAM(S): at 21:09

## 2018-08-16 RX ADMIN — Medication 0.5 MILLIGRAM(S): at 11:07

## 2018-08-16 RX ADMIN — SERTRALINE 150 MILLIGRAM(S): 25 TABLET, FILM COATED ORAL at 11:07

## 2018-08-16 RX ADMIN — LETROZOLE 2.5 MILLIGRAM(S): 2.5 TABLET, FILM COATED ORAL at 11:07

## 2018-08-16 RX ADMIN — Medication 1 MILLIGRAM(S): at 21:09

## 2018-08-16 RX ADMIN — Medication 3 MILLIGRAM(S): at 21:09

## 2018-08-16 RX ADMIN — QUETIAPINE FUMARATE 100 MILLIGRAM(S): 200 TABLET, FILM COATED ORAL at 21:09

## 2018-08-17 PROCEDURE — 99232 SBSQ HOSP IP/OBS MODERATE 35: CPT

## 2018-08-17 RX ADMIN — Medication 50 MILLIGRAM(S): at 20:45

## 2018-08-17 RX ADMIN — QUETIAPINE FUMARATE 100 MILLIGRAM(S): 200 TABLET, FILM COATED ORAL at 20:45

## 2018-08-17 RX ADMIN — LETROZOLE 2.5 MILLIGRAM(S): 2.5 TABLET, FILM COATED ORAL at 08:44

## 2018-08-17 RX ADMIN — Medication 3 MILLIGRAM(S): at 20:45

## 2018-08-17 RX ADMIN — SERTRALINE 150 MILLIGRAM(S): 25 TABLET, FILM COATED ORAL at 08:44

## 2018-08-17 RX ADMIN — Medication 1 MILLIGRAM(S): at 20:45

## 2018-08-17 RX ADMIN — Medication 0.5 MILLIGRAM(S): at 08:44

## 2018-08-18 PROCEDURE — 99232 SBSQ HOSP IP/OBS MODERATE 35: CPT

## 2018-08-18 RX ADMIN — Medication 1 MILLIGRAM(S): at 20:53

## 2018-08-18 RX ADMIN — Medication 0.5 MILLIGRAM(S): at 09:15

## 2018-08-18 RX ADMIN — Medication 3 MILLIGRAM(S): at 20:53

## 2018-08-18 RX ADMIN — SERTRALINE 150 MILLIGRAM(S): 25 TABLET, FILM COATED ORAL at 09:15

## 2018-08-18 RX ADMIN — Medication 50 MILLIGRAM(S): at 20:53

## 2018-08-18 RX ADMIN — Medication 0.5 MILLIGRAM(S): at 15:00

## 2018-08-18 RX ADMIN — LETROZOLE 2.5 MILLIGRAM(S): 2.5 TABLET, FILM COATED ORAL at 09:15

## 2018-08-18 RX ADMIN — QUETIAPINE FUMARATE 100 MILLIGRAM(S): 200 TABLET, FILM COATED ORAL at 20:53

## 2018-08-19 RX ADMIN — SERTRALINE 150 MILLIGRAM(S): 25 TABLET, FILM COATED ORAL at 09:22

## 2018-08-19 RX ADMIN — Medication 3 MILLIGRAM(S): at 20:30

## 2018-08-19 RX ADMIN — LETROZOLE 2.5 MILLIGRAM(S): 2.5 TABLET, FILM COATED ORAL at 09:22

## 2018-08-19 RX ADMIN — QUETIAPINE FUMARATE 100 MILLIGRAM(S): 200 TABLET, FILM COATED ORAL at 20:30

## 2018-08-19 RX ADMIN — Medication 50 MILLIGRAM(S): at 20:30

## 2018-08-19 RX ADMIN — Medication 0.5 MILLIGRAM(S): at 09:22

## 2018-08-19 RX ADMIN — Medication 1 MILLIGRAM(S): at 20:30

## 2018-08-20 PROCEDURE — 99223 1ST HOSP IP/OBS HIGH 75: CPT

## 2018-08-20 PROCEDURE — 99232 SBSQ HOSP IP/OBS MODERATE 35: CPT

## 2018-08-20 RX ORDER — CEPHALEXIN 500 MG
500 CAPSULE ORAL EVERY 12 HOURS
Qty: 0 | Refills: 0 | Status: COMPLETED | OUTPATIENT
Start: 2018-08-20 | End: 2018-08-25

## 2018-08-20 RX ORDER — CEPHALEXIN 500 MG
500 CAPSULE ORAL EVERY 12 HOURS
Qty: 0 | Refills: 0 | Status: DISCONTINUED | OUTPATIENT
Start: 2018-08-20 | End: 2018-08-20

## 2018-08-20 RX ADMIN — Medication 0.5 MILLIGRAM(S): at 08:13

## 2018-08-20 RX ADMIN — SERTRALINE 150 MILLIGRAM(S): 25 TABLET, FILM COATED ORAL at 08:13

## 2018-08-20 RX ADMIN — Medication 650 MILLIGRAM(S): at 10:07

## 2018-08-20 RX ADMIN — Medication 3 MILLIGRAM(S): at 20:40

## 2018-08-20 RX ADMIN — Medication 50 MILLIGRAM(S): at 20:40

## 2018-08-20 RX ADMIN — Medication 1 MILLIGRAM(S): at 20:40

## 2018-08-20 RX ADMIN — Medication 650 MILLIGRAM(S): at 06:56

## 2018-08-20 RX ADMIN — Medication 500 MILLIGRAM(S): at 20:40

## 2018-08-20 RX ADMIN — QUETIAPINE FUMARATE 100 MILLIGRAM(S): 200 TABLET, FILM COATED ORAL at 20:40

## 2018-08-20 RX ADMIN — LETROZOLE 2.5 MILLIGRAM(S): 2.5 TABLET, FILM COATED ORAL at 08:14

## 2018-08-20 NOTE — CONSULT NOTE ADULT - SUBJECTIVE AND OBJECTIVE BOX
HPI: 79 y/o F with PMH of breast cancer is admitted to [facility] with a primary psychiatric diagnosis of Schizoaffective disorder. I was called to evaluate patient with abdominal pain. During interview patient denied any abdominal pain. She said that she received tylenol and since then her abdominal pain has resolved. Reports chronic increased urinary frequency. Denies any acute changes in urinary habits. Denies dysuria. Denies constipation and reports she had normal BM today. No fevers or chills. Reports some arthritic pain in knee.     PAST MEDICAL & SURGICAL HISTORY:  Schizophrenia  Breast cancer  No significant past surgical history      Review of Systems:   CONSTITUTIONAL: No fever  EYES: No visual changes  ENMT:  No sinus or throat pain  NECK: No pain or stiffness  RESPIRATORY: No cough  CARDIOVASCULAR: No chest pain  GASTROINTESTINAL: RLQ abdominal pain  GENITOURINARY: No dysuria, frequency, hematuria, or incontinence  NEUROLOGICAL: No headaches  SKIN: No itching, burning, rashes, or lesions   LYMPH NODES: No enlarged glands  ENDOCRINE: No heat or cold intolerance; No hair loss  MUSCULOSKELETAL: arthritic pain in knees   HEME/LYMPH: No easy bruising, or bleeding gums  ALLERY AND IMMUNOLOGIC: No hives or eczema    Allergies    Keppra (Rash)  PT ALLERGIC TO THE GENERIC OF ZYPREXA (Rash)    Intolerances    Social History: Lives alone. . Denies drinking or smoking     FAMILY HISTORY:  Arthritis in family.     MEDICATIONS  (STANDING):  cephalexin 500 milliGRAM(s) Oral every 12 hours  letrozole 2.5 milliGRAM(s) Oral daily  LORazepam     Tablet 0.5 milliGRAM(s) Oral daily  LORazepam     Tablet 1 milliGRAM(s) Oral at bedtime  melatonin. 3 milliGRAM(s) Oral at bedtime  QUEtiapine 100 milliGRAM(s) Oral at bedtime  sertraline 150 milliGRAM(s) Oral daily  traZODone 50 milliGRAM(s) Oral at bedtime    MEDICATIONS  (PRN):  acetaminophen   Tablet. 650 milliGRAM(s) Oral every 6 hours PRN Mild Pain (1 - 3)  LORazepam     Tablet 0.5 milliGRAM(s) Oral every 4 hours PRN Anxiety  LORazepam   Injectable 0.5 milliGRAM(s) IntraMuscular once PRN Agitation    Vital Signs Last 24 Hrs  T(C): 36.2 (20 Aug 2018 06:13), Max: 36.4 (19 Aug 2018 15:52)  T(F): 97.1 (20 Aug 2018 06:13), Max: 97.5 (19 Aug 2018 15:52)  HR: --58  BP: --127/66  BP(mean): --  RR: --  SpO2: --    PHYSICAL EXAM:  GENERAL: NAD, well-developed  HEAD:  Atraumatic, Normocephalic  EYES: EOMI, PERRLA, conjunctiva and sclera clear  NECK: Supple, No JVD  CHEST/LUNG: Clear to auscultation bilaterally; No wheeze  HEART: Regular rate and rhythm; No murmurs, rubs, or gallops  ABDOMEN: Soft, Nontender, Nondistended; Bowel sounds present  EXTREMITIES:  2+ Peripheral Pulses, No clubbing, cyanosis, or edema  PSYCH: AAOx3  NEUROLOGY: non-focal  SKIN: No rashes or lesions    LABS:    Complete Blood Count + Automated Diff (08.15.18 @ 08:40)    Nucleated RBC #: 0    WBC Count: 4.94 K/uL    RBC Count: 3.90 M/uL    Hemoglobin: 11.4 g/dL    Hematocrit: 34.7 %    Mean Cell Volume: 89.0 fL    Mean Cell Hemoglobin: 29.2 pg    Mean Cell Hemoglobin Conc: 32.9 %    Red Cell Distrib Width: 13.5 %    Platelet Count - Automated: 187 K/uL    MPV: 10.1 fl    Auto Neutrophil #: 3.56 K/uL    Auto Lymphocyte #: 0.70 K/uL    Auto Monocyte #: 0.54 K/uL    Auto Eosinophil #: 0.08 K/uL    Auto Basophil #: 0.03 K/uL    Auto Immature Granulocyte #: 0.03: (Includes meta, myelo and promyelocytes) #    Auto Neutrophil %: 72.1 %    Auto Lymphocyte %: 14.2 %    Auto Monocyte %: 10.9 %    Auto Eosinophil %: 1.6 %    Auto Basophil %: 0.6 %    Auto Immature Granulocyte %: 0.6: (Includes meta, myelo and promyelocytes) %    Urinalysis (08.16.18 @ 09:03)    Color: YELLOW    Urine Appearance: CLEAR    Glucose: NEGATIVE    Bilirubin: NEGATIVE    Ketone - Urine: NEGATIVE    Specific Gravity: 1.024    Blood: NEGATIVE    pH - Urine: 6.0    Protein, Urine: NEGATIVE    Urobilinogen: NORMAL    Nitrite: NEGATIVE    Leukocyte Esterase Concentration: TRACE    Red Blood Cell - Urine: 6-10    White Blood Cell - Urine: 11-25    White Blood Cell Casts: NEGATIVE    Bacteria: NEGATIVE    Squamous Epithelial: MODERATE    Complete Blood Count + Automated Diff (08.15.18 @ 08:40)    Nucleated RBC #: 0    WBC Count: 4.94 K/uL    RBC Count: 3.90 M/uL    Hemoglobin: 11.4 g/dL    Hematocrit: 34.7 %    Mean Cell Volume: 89.0 fL    Mean Cell Hemoglobin: 29.2 pg    Mean Cell Hemoglobin Conc: 32.9 %    Red Cell Distrib Width: 13.5 %    Platelet Count - Automated: 187 K/uL    MPV: 10.1 fl    Auto Neutrophil #: 3.56 K/uL    Auto Lymphocyte #: 0.70 K/uL    Auto Monocyte #: 0.54 K/uL    Auto Eosinophil #: 0.08 K/uL    Auto Basophil #: 0.03 K/uL    Auto Immature Granulocyte #: 0.03: (Includes meta, myelo and promyelocytes) #    Auto Neutrophil %: 72.1 %    Auto Lymphocyte %: 14.2 %    Auto Monocyte %: 10.9 %    Auto Eosinophil %: 1.6 %    Auto Basophil %: 0.6 %    Auto Immature Granulocyte %: 0.6: (Includes meta, myelo and promyelocytes) %        Consultant(s) Notes Reviewed:      Care Discussed with Consultants/Other Providers:

## 2018-08-21 PROCEDURE — 99232 SBSQ HOSP IP/OBS MODERATE 35: CPT

## 2018-08-21 RX ORDER — SERTRALINE 25 MG/1
200 TABLET, FILM COATED ORAL DAILY
Qty: 0 | Refills: 0 | Status: DISCONTINUED | OUTPATIENT
Start: 2018-08-21 | End: 2018-08-27

## 2018-08-21 RX ADMIN — QUETIAPINE FUMARATE 100 MILLIGRAM(S): 200 TABLET, FILM COATED ORAL at 21:42

## 2018-08-21 RX ADMIN — Medication 500 MILLIGRAM(S): at 21:42

## 2018-08-21 RX ADMIN — SERTRALINE 200 MILLIGRAM(S): 25 TABLET, FILM COATED ORAL at 09:35

## 2018-08-21 RX ADMIN — Medication 0.5 MILLIGRAM(S): at 09:35

## 2018-08-21 RX ADMIN — Medication 3 MILLIGRAM(S): at 21:42

## 2018-08-21 RX ADMIN — Medication 500 MILLIGRAM(S): at 09:35

## 2018-08-21 RX ADMIN — Medication 50 MILLIGRAM(S): at 21:42

## 2018-08-21 RX ADMIN — Medication 1 MILLIGRAM(S): at 21:42

## 2018-08-21 RX ADMIN — LETROZOLE 2.5 MILLIGRAM(S): 2.5 TABLET, FILM COATED ORAL at 09:35

## 2018-08-22 LAB
BACTERIA UR CULT: SIGNIFICANT CHANGE UP
SPECIMEN SOURCE: SIGNIFICANT CHANGE UP

## 2018-08-22 PROCEDURE — 99232 SBSQ HOSP IP/OBS MODERATE 35: CPT

## 2018-08-22 RX ADMIN — Medication 50 MILLIGRAM(S): at 21:10

## 2018-08-22 RX ADMIN — Medication 500 MILLIGRAM(S): at 21:10

## 2018-08-22 RX ADMIN — Medication 650 MILLIGRAM(S): at 11:45

## 2018-08-22 RX ADMIN — QUETIAPINE FUMARATE 100 MILLIGRAM(S): 200 TABLET, FILM COATED ORAL at 21:10

## 2018-08-22 RX ADMIN — LETROZOLE 2.5 MILLIGRAM(S): 2.5 TABLET, FILM COATED ORAL at 09:36

## 2018-08-22 RX ADMIN — Medication 3 MILLIGRAM(S): at 21:10

## 2018-08-22 RX ADMIN — SERTRALINE 200 MILLIGRAM(S): 25 TABLET, FILM COATED ORAL at 09:36

## 2018-08-22 RX ADMIN — Medication 650 MILLIGRAM(S): at 12:45

## 2018-08-22 RX ADMIN — Medication 500 MILLIGRAM(S): at 09:36

## 2018-08-23 PROCEDURE — 99232 SBSQ HOSP IP/OBS MODERATE 35: CPT

## 2018-08-23 RX ADMIN — Medication 3 MILLIGRAM(S): at 20:23

## 2018-08-23 RX ADMIN — Medication 650 MILLIGRAM(S): at 11:00

## 2018-08-23 RX ADMIN — Medication 500 MILLIGRAM(S): at 08:36

## 2018-08-23 RX ADMIN — Medication 0.5 MILLIGRAM(S): at 08:36

## 2018-08-23 RX ADMIN — Medication 50 MILLIGRAM(S): at 20:23

## 2018-08-23 RX ADMIN — Medication 0.5 MILLIGRAM(S): at 20:23

## 2018-08-23 RX ADMIN — LETROZOLE 2.5 MILLIGRAM(S): 2.5 TABLET, FILM COATED ORAL at 08:36

## 2018-08-23 RX ADMIN — Medication 650 MILLIGRAM(S): at 05:13

## 2018-08-23 RX ADMIN — Medication 500 MILLIGRAM(S): at 20:23

## 2018-08-23 RX ADMIN — Medication 0.5 MILLIGRAM(S): at 16:11

## 2018-08-23 RX ADMIN — SERTRALINE 200 MILLIGRAM(S): 25 TABLET, FILM COATED ORAL at 08:36

## 2018-08-23 RX ADMIN — QUETIAPINE FUMARATE 100 MILLIGRAM(S): 200 TABLET, FILM COATED ORAL at 20:23

## 2018-08-23 RX ADMIN — Medication 650 MILLIGRAM(S): at 00:41

## 2018-08-23 RX ADMIN — Medication 650 MILLIGRAM(S): at 10:48

## 2018-08-24 LAB
ALBUMIN SERPL ELPH-MCNC: 4 G/DL — SIGNIFICANT CHANGE UP (ref 3.3–5)
ALP SERPL-CCNC: 104 U/L — SIGNIFICANT CHANGE UP (ref 40–120)
ALT FLD-CCNC: 22 U/L — SIGNIFICANT CHANGE UP (ref 4–33)
AST SERPL-CCNC: 20 U/L — SIGNIFICANT CHANGE UP (ref 4–32)
BILIRUB SERPL-MCNC: 0.4 MG/DL — SIGNIFICANT CHANGE UP (ref 0.2–1.2)
BUN SERPL-MCNC: 28 MG/DL — HIGH (ref 7–23)
CALCIUM SERPL-MCNC: 9.4 MG/DL — SIGNIFICANT CHANGE UP (ref 8.4–10.5)
CHLORIDE SERPL-SCNC: 105 MMOL/L — SIGNIFICANT CHANGE UP (ref 98–107)
CO2 SERPL-SCNC: 24 MMOL/L — SIGNIFICANT CHANGE UP (ref 22–31)
CREAT SERPL-MCNC: 0.79 MG/DL — SIGNIFICANT CHANGE UP (ref 0.5–1.3)
GLUCOSE SERPL-MCNC: 97 MG/DL — SIGNIFICANT CHANGE UP (ref 70–99)
HCT VFR BLD CALC: 32.4 % — LOW (ref 34.5–45)
HGB BLD-MCNC: 10.8 G/DL — LOW (ref 11.5–15.5)
LIDOCAIN IGE QN: 52.4 U/L — SIGNIFICANT CHANGE UP (ref 7–60)
MCHC RBC-ENTMCNC: 29.3 PG — SIGNIFICANT CHANGE UP (ref 27–34)
MCHC RBC-ENTMCNC: 33.3 % — SIGNIFICANT CHANGE UP (ref 32–36)
MCV RBC AUTO: 87.8 FL — SIGNIFICANT CHANGE UP (ref 80–100)
NRBC # FLD: 0 — SIGNIFICANT CHANGE UP
PLATELET # BLD AUTO: 157 K/UL — SIGNIFICANT CHANGE UP (ref 150–400)
PMV BLD: 9.6 FL — SIGNIFICANT CHANGE UP (ref 7–13)
POTASSIUM SERPL-MCNC: 4.4 MMOL/L — SIGNIFICANT CHANGE UP (ref 3.5–5.3)
POTASSIUM SERPL-SCNC: 4.4 MMOL/L — SIGNIFICANT CHANGE UP (ref 3.5–5.3)
PROT SERPL-MCNC: 6.9 G/DL — SIGNIFICANT CHANGE UP (ref 6–8.3)
RBC # BLD: 3.69 M/UL — LOW (ref 3.8–5.2)
RBC # FLD: 13.1 % — SIGNIFICANT CHANGE UP (ref 10.3–14.5)
SODIUM SERPL-SCNC: 138 MMOL/L — SIGNIFICANT CHANGE UP (ref 135–145)
WBC # BLD: 5.39 K/UL — SIGNIFICANT CHANGE UP (ref 3.8–10.5)
WBC # FLD AUTO: 5.39 K/UL — SIGNIFICANT CHANGE UP (ref 3.8–10.5)

## 2018-08-24 PROCEDURE — 99232 SBSQ HOSP IP/OBS MODERATE 35: CPT

## 2018-08-24 RX ORDER — PHENYLEPHRINE-SHARK LIVER OIL-MINERAL OIL-PETROLATUM RECTAL OINTMENT
1 OINTMENT (GRAM) RECTAL EVERY 6 HOURS
Qty: 0 | Refills: 0 | Status: DISCONTINUED | OUTPATIENT
Start: 2018-08-24 | End: 2018-10-03

## 2018-08-24 RX ADMIN — Medication 500 MILLIGRAM(S): at 08:40

## 2018-08-24 RX ADMIN — Medication 500 MILLIGRAM(S): at 20:54

## 2018-08-24 RX ADMIN — Medication 0.5 MILLIGRAM(S): at 20:54

## 2018-08-24 RX ADMIN — Medication 50 MILLIGRAM(S): at 20:55

## 2018-08-24 RX ADMIN — Medication 3 MILLIGRAM(S): at 20:54

## 2018-08-24 RX ADMIN — LETROZOLE 2.5 MILLIGRAM(S): 2.5 TABLET, FILM COATED ORAL at 08:40

## 2018-08-24 RX ADMIN — SERTRALINE 200 MILLIGRAM(S): 25 TABLET, FILM COATED ORAL at 08:40

## 2018-08-24 RX ADMIN — Medication 0.5 MILLIGRAM(S): at 08:40

## 2018-08-24 RX ADMIN — QUETIAPINE FUMARATE 100 MILLIGRAM(S): 200 TABLET, FILM COATED ORAL at 20:54

## 2018-08-25 RX ADMIN — Medication 650 MILLIGRAM(S): at 14:10

## 2018-08-25 RX ADMIN — Medication 50 MILLIGRAM(S): at 21:04

## 2018-08-25 RX ADMIN — SERTRALINE 200 MILLIGRAM(S): 25 TABLET, FILM COATED ORAL at 09:11

## 2018-08-25 RX ADMIN — QUETIAPINE FUMARATE 100 MILLIGRAM(S): 200 TABLET, FILM COATED ORAL at 21:04

## 2018-08-25 RX ADMIN — Medication 0.5 MILLIGRAM(S): at 21:04

## 2018-08-25 RX ADMIN — LETROZOLE 2.5 MILLIGRAM(S): 2.5 TABLET, FILM COATED ORAL at 09:10

## 2018-08-25 RX ADMIN — Medication 0.5 MILLIGRAM(S): at 09:11

## 2018-08-25 RX ADMIN — Medication 650 MILLIGRAM(S): at 13:10

## 2018-08-25 RX ADMIN — Medication 3 MILLIGRAM(S): at 21:04

## 2018-08-25 RX ADMIN — Medication 500 MILLIGRAM(S): at 09:11

## 2018-08-26 LAB
APPEARANCE UR: CLEAR — SIGNIFICANT CHANGE UP
BILIRUB UR-MCNC: NEGATIVE — SIGNIFICANT CHANGE UP
BLOOD UR QL VISUAL: NEGATIVE — SIGNIFICANT CHANGE UP
COLOR SPEC: SIGNIFICANT CHANGE UP
GLUCOSE UR-MCNC: NEGATIVE — SIGNIFICANT CHANGE UP
KETONES UR-MCNC: NEGATIVE — SIGNIFICANT CHANGE UP
LEUKOCYTE ESTERASE UR-ACNC: NEGATIVE — SIGNIFICANT CHANGE UP
NITRITE UR-MCNC: NEGATIVE — SIGNIFICANT CHANGE UP
PH UR: 6 — SIGNIFICANT CHANGE UP (ref 5–8)
PROT UR-MCNC: NEGATIVE — SIGNIFICANT CHANGE UP
RBC CASTS # UR COMP ASSIST: SIGNIFICANT CHANGE UP (ref 0–?)
SP GR SPEC: 1.02 — SIGNIFICANT CHANGE UP (ref 1–1.04)
UROBILINOGEN FLD QL: NORMAL — SIGNIFICANT CHANGE UP
WBC UR QL: SIGNIFICANT CHANGE UP (ref 0–?)

## 2018-08-26 RX ADMIN — Medication 3 MILLIGRAM(S): at 20:51

## 2018-08-26 RX ADMIN — Medication 650 MILLIGRAM(S): at 08:50

## 2018-08-26 RX ADMIN — PHENYLEPHRINE-SHARK LIVER OIL-MINERAL OIL-PETROLATUM RECTAL OINTMENT 1 APPLICATION(S): at 18:00

## 2018-08-26 RX ADMIN — Medication 50 MILLIGRAM(S): at 20:51

## 2018-08-26 RX ADMIN — Medication 650 MILLIGRAM(S): at 16:00

## 2018-08-26 RX ADMIN — Medication 650 MILLIGRAM(S): at 09:50

## 2018-08-26 RX ADMIN — Medication 0.5 MILLIGRAM(S): at 20:51

## 2018-08-26 RX ADMIN — LETROZOLE 2.5 MILLIGRAM(S): 2.5 TABLET, FILM COATED ORAL at 08:52

## 2018-08-26 RX ADMIN — Medication 650 MILLIGRAM(S): at 17:00

## 2018-08-26 RX ADMIN — Medication 0.5 MILLIGRAM(S): at 08:52

## 2018-08-26 RX ADMIN — QUETIAPINE FUMARATE 100 MILLIGRAM(S): 200 TABLET, FILM COATED ORAL at 20:51

## 2018-08-26 RX ADMIN — SERTRALINE 200 MILLIGRAM(S): 25 TABLET, FILM COATED ORAL at 08:52

## 2018-08-27 PROCEDURE — 99232 SBSQ HOSP IP/OBS MODERATE 35: CPT

## 2018-08-27 RX ORDER — SERTRALINE 25 MG/1
200 TABLET, FILM COATED ORAL
Qty: 0 | Refills: 0 | Status: DISCONTINUED | OUTPATIENT
Start: 2018-08-27 | End: 2018-09-11

## 2018-08-27 RX ORDER — QUETIAPINE FUMARATE 200 MG/1
25 TABLET, FILM COATED ORAL
Qty: 0 | Refills: 0 | Status: DISCONTINUED | OUTPATIENT
Start: 2018-08-27 | End: 2018-08-30

## 2018-08-27 RX ADMIN — QUETIAPINE FUMARATE 100 MILLIGRAM(S): 200 TABLET, FILM COATED ORAL at 20:34

## 2018-08-27 RX ADMIN — SERTRALINE 200 MILLIGRAM(S): 25 TABLET, FILM COATED ORAL at 08:53

## 2018-08-27 RX ADMIN — QUETIAPINE FUMARATE 25 MILLIGRAM(S): 200 TABLET, FILM COATED ORAL at 13:49

## 2018-08-27 RX ADMIN — Medication 3 MILLIGRAM(S): at 20:34

## 2018-08-27 RX ADMIN — LETROZOLE 2.5 MILLIGRAM(S): 2.5 TABLET, FILM COATED ORAL at 08:53

## 2018-08-27 RX ADMIN — Medication 0.5 MILLIGRAM(S): at 08:53

## 2018-08-27 RX ADMIN — Medication 0.5 MILLIGRAM(S): at 20:34

## 2018-08-27 RX ADMIN — Medication 50 MILLIGRAM(S): at 20:34

## 2018-08-28 PROCEDURE — 99232 SBSQ HOSP IP/OBS MODERATE 35: CPT

## 2018-08-28 RX ORDER — HYDROXYZINE HCL 10 MG
25 TABLET ORAL EVERY 6 HOURS
Qty: 0 | Refills: 0 | Status: DISCONTINUED | OUTPATIENT
Start: 2018-08-28 | End: 2018-10-03

## 2018-08-28 RX ADMIN — QUETIAPINE FUMARATE 100 MILLIGRAM(S): 200 TABLET, FILM COATED ORAL at 20:55

## 2018-08-28 RX ADMIN — Medication 0.5 MILLIGRAM(S): at 20:55

## 2018-08-28 RX ADMIN — Medication 0.5 MILLIGRAM(S): at 06:46

## 2018-08-28 RX ADMIN — QUETIAPINE FUMARATE 25 MILLIGRAM(S): 200 TABLET, FILM COATED ORAL at 12:59

## 2018-08-28 RX ADMIN — LETROZOLE 2.5 MILLIGRAM(S): 2.5 TABLET, FILM COATED ORAL at 08:57

## 2018-08-28 RX ADMIN — QUETIAPINE FUMARATE 25 MILLIGRAM(S): 200 TABLET, FILM COATED ORAL at 06:46

## 2018-08-28 RX ADMIN — SERTRALINE 200 MILLIGRAM(S): 25 TABLET, FILM COATED ORAL at 06:46

## 2018-08-28 RX ADMIN — Medication 3 MILLIGRAM(S): at 20:55

## 2018-08-28 RX ADMIN — Medication 50 MILLIGRAM(S): at 20:55

## 2018-08-29 PROCEDURE — 99232 SBSQ HOSP IP/OBS MODERATE 35: CPT

## 2018-08-29 RX ORDER — POLYETHYLENE GLYCOL 3350 17 G/17G
17 POWDER, FOR SOLUTION ORAL DAILY
Qty: 0 | Refills: 0 | Status: DISCONTINUED | OUTPATIENT
Start: 2018-08-29 | End: 2018-10-03

## 2018-08-29 RX ORDER — DOCUSATE SODIUM 100 MG
100 CAPSULE ORAL THREE TIMES A DAY
Qty: 0 | Refills: 0 | Status: DISCONTINUED | OUTPATIENT
Start: 2018-08-29 | End: 2018-10-03

## 2018-08-29 RX ADMIN — Medication 100 MILLIGRAM(S): at 13:00

## 2018-08-29 RX ADMIN — QUETIAPINE FUMARATE 25 MILLIGRAM(S): 200 TABLET, FILM COATED ORAL at 06:29

## 2018-08-29 RX ADMIN — Medication 3 MILLIGRAM(S): at 20:45

## 2018-08-29 RX ADMIN — Medication 0.5 MILLIGRAM(S): at 06:29

## 2018-08-29 RX ADMIN — QUETIAPINE FUMARATE 100 MILLIGRAM(S): 200 TABLET, FILM COATED ORAL at 20:45

## 2018-08-29 RX ADMIN — Medication 50 MILLIGRAM(S): at 20:45

## 2018-08-29 RX ADMIN — LETROZOLE 2.5 MILLIGRAM(S): 2.5 TABLET, FILM COATED ORAL at 08:53

## 2018-08-29 RX ADMIN — Medication 0.5 MILLIGRAM(S): at 13:00

## 2018-08-29 RX ADMIN — Medication 0.5 MILLIGRAM(S): at 20:45

## 2018-08-29 RX ADMIN — SERTRALINE 200 MILLIGRAM(S): 25 TABLET, FILM COATED ORAL at 06:30

## 2018-08-29 RX ADMIN — POLYETHYLENE GLYCOL 3350 17 GRAM(S): 17 POWDER, FOR SOLUTION ORAL at 21:37

## 2018-08-29 RX ADMIN — QUETIAPINE FUMARATE 25 MILLIGRAM(S): 200 TABLET, FILM COATED ORAL at 13:00

## 2018-08-30 DIAGNOSIS — R30.0 DYSURIA: ICD-10-CM

## 2018-08-30 DIAGNOSIS — K62.89 OTHER SPECIFIED DISEASES OF ANUS AND RECTUM: ICD-10-CM

## 2018-08-30 LAB
APPEARANCE UR: CLEAR — SIGNIFICANT CHANGE UP
BACTERIA # UR AUTO: NEGATIVE — SIGNIFICANT CHANGE UP
BILIRUB UR-MCNC: NEGATIVE — SIGNIFICANT CHANGE UP
BLOOD UR QL VISUAL: NEGATIVE — SIGNIFICANT CHANGE UP
CHOLEST SERPL-MCNC: 142 MG/DL — SIGNIFICANT CHANGE UP (ref 120–199)
COLOR SPEC: SIGNIFICANT CHANGE UP
FOLATE SERPL-MCNC: 14.5 NG/ML — SIGNIFICANT CHANGE UP (ref 4.7–20)
GLUCOSE UR-MCNC: NEGATIVE — SIGNIFICANT CHANGE UP
HDLC SERPL-MCNC: 44 MG/DL — LOW (ref 45–65)
HYALINE CASTS # UR AUTO: NEGATIVE — SIGNIFICANT CHANGE UP
KETONES UR-MCNC: NEGATIVE — SIGNIFICANT CHANGE UP
LEUKOCYTE ESTERASE UR-ACNC: SIGNIFICANT CHANGE UP
LIPID PNL WITH DIRECT LDL SERPL: 86 MG/DL — SIGNIFICANT CHANGE UP
NITRITE UR-MCNC: NEGATIVE — SIGNIFICANT CHANGE UP
PH UR: 7 — SIGNIFICANT CHANGE UP (ref 5–8)
PROT UR-MCNC: NEGATIVE — SIGNIFICANT CHANGE UP
RBC CASTS # UR COMP ASSIST: SIGNIFICANT CHANGE UP (ref 0–?)
SP GR SPEC: 1.01 — SIGNIFICANT CHANGE UP (ref 1–1.04)
SQUAMOUS # UR AUTO: SIGNIFICANT CHANGE UP
T PALLIDUM AB TITR SER: NEGATIVE — SIGNIFICANT CHANGE UP
TRIGL SERPL-MCNC: 96 MG/DL — SIGNIFICANT CHANGE UP (ref 10–149)
UROBILINOGEN FLD QL: NORMAL — SIGNIFICANT CHANGE UP
VIT B12 SERPL-MCNC: 430 PG/ML — SIGNIFICANT CHANGE UP (ref 200–900)
WBC UR QL: SIGNIFICANT CHANGE UP (ref 0–?)

## 2018-08-30 PROCEDURE — 99223 1ST HOSP IP/OBS HIGH 75: CPT

## 2018-08-30 PROCEDURE — 99232 SBSQ HOSP IP/OBS MODERATE 35: CPT

## 2018-08-30 RX ORDER — QUETIAPINE FUMARATE 200 MG/1
25 TABLET, FILM COATED ORAL ONCE
Qty: 0 | Refills: 0 | Status: COMPLETED | OUTPATIENT
Start: 2018-08-30 | End: 2018-08-30

## 2018-08-30 RX ORDER — HYDROCORTISONE 1 %
1 OINTMENT (GRAM) TOPICAL AT BEDTIME
Qty: 0 | Refills: 0 | Status: COMPLETED | OUTPATIENT
Start: 2018-08-30 | End: 2018-09-05

## 2018-08-30 RX ORDER — QUETIAPINE FUMARATE 200 MG/1
50 TABLET, FILM COATED ORAL
Qty: 0 | Refills: 0 | Status: DISCONTINUED | OUTPATIENT
Start: 2018-08-30 | End: 2018-09-04

## 2018-08-30 RX ORDER — HYDROCORTISONE 1 %
1 OINTMENT (GRAM) TOPICAL AT BEDTIME
Qty: 0 | Refills: 0 | Status: DISCONTINUED | OUTPATIENT
Start: 2018-08-30 | End: 2018-08-30

## 2018-08-30 RX ADMIN — QUETIAPINE FUMARATE 100 MILLIGRAM(S): 200 TABLET, FILM COATED ORAL at 20:27

## 2018-08-30 RX ADMIN — LETROZOLE 2.5 MILLIGRAM(S): 2.5 TABLET, FILM COATED ORAL at 08:49

## 2018-08-30 RX ADMIN — Medication 0.5 MILLIGRAM(S): at 20:26

## 2018-08-30 RX ADMIN — QUETIAPINE FUMARATE 25 MILLIGRAM(S): 200 TABLET, FILM COATED ORAL at 14:27

## 2018-08-30 RX ADMIN — Medication 100 MILLIGRAM(S): at 12:02

## 2018-08-30 RX ADMIN — QUETIAPINE FUMARATE 25 MILLIGRAM(S): 200 TABLET, FILM COATED ORAL at 12:41

## 2018-08-30 RX ADMIN — QUETIAPINE FUMARATE 25 MILLIGRAM(S): 200 TABLET, FILM COATED ORAL at 05:52

## 2018-08-30 RX ADMIN — Medication 0.5 MILLIGRAM(S): at 12:41

## 2018-08-30 RX ADMIN — Medication 0.5 MILLIGRAM(S): at 05:52

## 2018-08-30 RX ADMIN — Medication 50 MILLIGRAM(S): at 20:27

## 2018-08-30 RX ADMIN — POLYETHYLENE GLYCOL 3350 17 GRAM(S): 17 POWDER, FOR SOLUTION ORAL at 12:03

## 2018-08-30 RX ADMIN — Medication 1 APPLICATION(S): at 21:51

## 2018-08-30 RX ADMIN — SERTRALINE 200 MILLIGRAM(S): 25 TABLET, FILM COATED ORAL at 05:52

## 2018-08-30 RX ADMIN — Medication 3 MILLIGRAM(S): at 20:26

## 2018-08-30 NOTE — CONSULT NOTE ADULT - SUBJECTIVE AND OBJECTIVE BOX
Patient C/O burning on urination and anal burning     S/P Tx for B Strep found on urine C&S on 8/20 Patient C/O burning on urination and anal burning     S/P Tx for B Strep found on urine C&S on 8/20    Abdomen: Soft nontender, without masses    Pelvic:  Ext genitalia, without lesions  Vagina, without bleeding or discharge No evidence of prolapse with valsalva  Cervix closed, freely mobile  Uterus Normal sized , mobile and nontender Adnexa, without masses or pain    Rectum: Without bleeding or pain, no palpable masses, good sphincter tone

## 2018-08-30 NOTE — CONSULT NOTE ADULT - ASSESSMENT
79 y/o F with PMH of breast cancer is admitted to [facility] with a primary psychiatric diagnosis of Schizoaffective disorder. Called to evaluate possible UTI given abdominal pain and slightly positive UA    #Acute cystitis uncomplicated - Last urine culture with groupB strept. UA this time slightly positive but not very remarkable. Patient's urinary symptoms appear chronic however now has new lower abdominal pain. Send urine culture and treat with short course of kefelx.     # Abdominal pain - Now resolved with tylenol. Denies being constipated. Benign exam. Treat UTI and monitor. Please call back if abdominal pain re-occurs.     # Breast ca - in remission. C/w Letrozole    # Schizoaffective d/o - management per psych
Dysuria and anal burning

## 2018-08-31 PROCEDURE — 99232 SBSQ HOSP IP/OBS MODERATE 35: CPT

## 2018-08-31 RX ADMIN — Medication 50 MILLIGRAM(S): at 21:10

## 2018-08-31 RX ADMIN — SERTRALINE 200 MILLIGRAM(S): 25 TABLET, FILM COATED ORAL at 06:20

## 2018-08-31 RX ADMIN — LETROZOLE 2.5 MILLIGRAM(S): 2.5 TABLET, FILM COATED ORAL at 13:28

## 2018-08-31 RX ADMIN — Medication 0.5 MILLIGRAM(S): at 21:10

## 2018-08-31 RX ADMIN — Medication 3 MILLIGRAM(S): at 21:10

## 2018-08-31 RX ADMIN — Medication 1 APPLICATION(S): at 22:04

## 2018-08-31 RX ADMIN — QUETIAPINE FUMARATE 100 MILLIGRAM(S): 200 TABLET, FILM COATED ORAL at 21:10

## 2018-08-31 RX ADMIN — Medication 0.5 MILLIGRAM(S): at 06:20

## 2018-08-31 RX ADMIN — Medication 25 MILLIGRAM(S): at 18:30

## 2018-08-31 RX ADMIN — Medication 0.5 MILLIGRAM(S): at 13:28

## 2018-08-31 RX ADMIN — QUETIAPINE FUMARATE 50 MILLIGRAM(S): 200 TABLET, FILM COATED ORAL at 06:20

## 2018-09-01 LAB
BACTERIA UR CULT: SIGNIFICANT CHANGE UP
SPECIMEN SOURCE: SIGNIFICANT CHANGE UP

## 2018-09-01 RX ADMIN — Medication 1 APPLICATION(S): at 20:24

## 2018-09-01 RX ADMIN — Medication 0.5 MILLIGRAM(S): at 13:04

## 2018-09-01 RX ADMIN — Medication 3 MILLIGRAM(S): at 20:43

## 2018-09-01 RX ADMIN — Medication 0.5 MILLIGRAM(S): at 05:35

## 2018-09-01 RX ADMIN — QUETIAPINE FUMARATE 50 MILLIGRAM(S): 200 TABLET, FILM COATED ORAL at 05:35

## 2018-09-01 RX ADMIN — Medication 50 MILLIGRAM(S): at 20:43

## 2018-09-01 RX ADMIN — Medication 0.5 MILLIGRAM(S): at 20:43

## 2018-09-01 RX ADMIN — QUETIAPINE FUMARATE 100 MILLIGRAM(S): 200 TABLET, FILM COATED ORAL at 20:43

## 2018-09-01 RX ADMIN — SERTRALINE 200 MILLIGRAM(S): 25 TABLET, FILM COATED ORAL at 05:35

## 2018-09-01 RX ADMIN — QUETIAPINE FUMARATE 50 MILLIGRAM(S): 200 TABLET, FILM COATED ORAL at 13:04

## 2018-09-01 RX ADMIN — LETROZOLE 2.5 MILLIGRAM(S): 2.5 TABLET, FILM COATED ORAL at 13:04

## 2018-09-02 PROCEDURE — 99232 SBSQ HOSP IP/OBS MODERATE 35: CPT

## 2018-09-02 RX ADMIN — Medication 0.5 MILLIGRAM(S): at 20:55

## 2018-09-02 RX ADMIN — Medication 1 APPLICATION(S): at 22:19

## 2018-09-02 RX ADMIN — QUETIAPINE FUMARATE 50 MILLIGRAM(S): 200 TABLET, FILM COATED ORAL at 13:30

## 2018-09-02 RX ADMIN — Medication 50 MILLIGRAM(S): at 20:54

## 2018-09-02 RX ADMIN — QUETIAPINE FUMARATE 50 MILLIGRAM(S): 200 TABLET, FILM COATED ORAL at 06:06

## 2018-09-02 RX ADMIN — SERTRALINE 200 MILLIGRAM(S): 25 TABLET, FILM COATED ORAL at 06:06

## 2018-09-02 RX ADMIN — LETROZOLE 2.5 MILLIGRAM(S): 2.5 TABLET, FILM COATED ORAL at 10:07

## 2018-09-02 RX ADMIN — Medication 0.5 MILLIGRAM(S): at 06:06

## 2018-09-02 RX ADMIN — PHENYLEPHRINE-SHARK LIVER OIL-MINERAL OIL-PETROLATUM RECTAL OINTMENT 1 APPLICATION(S): at 16:18

## 2018-09-02 RX ADMIN — QUETIAPINE FUMARATE 100 MILLIGRAM(S): 200 TABLET, FILM COATED ORAL at 20:54

## 2018-09-02 RX ADMIN — Medication 3 MILLIGRAM(S): at 20:55

## 2018-09-02 RX ADMIN — PHENYLEPHRINE-SHARK LIVER OIL-MINERAL OIL-PETROLATUM RECTAL OINTMENT 1 APPLICATION(S): at 10:10

## 2018-09-02 RX ADMIN — Medication 0.5 MILLIGRAM(S): at 13:30

## 2018-09-03 RX ADMIN — QUETIAPINE FUMARATE 50 MILLIGRAM(S): 200 TABLET, FILM COATED ORAL at 13:18

## 2018-09-03 RX ADMIN — LETROZOLE 2.5 MILLIGRAM(S): 2.5 TABLET, FILM COATED ORAL at 09:59

## 2018-09-03 RX ADMIN — SERTRALINE 200 MILLIGRAM(S): 25 TABLET, FILM COATED ORAL at 06:45

## 2018-09-03 RX ADMIN — Medication 50 MILLIGRAM(S): at 21:27

## 2018-09-03 RX ADMIN — Medication 3 MILLIGRAM(S): at 21:27

## 2018-09-03 RX ADMIN — QUETIAPINE FUMARATE 100 MILLIGRAM(S): 200 TABLET, FILM COATED ORAL at 21:27

## 2018-09-03 RX ADMIN — Medication 0.5 MILLIGRAM(S): at 06:45

## 2018-09-03 RX ADMIN — Medication 0.5 MILLIGRAM(S): at 21:27

## 2018-09-03 RX ADMIN — Medication 1 APPLICATION(S): at 21:27

## 2018-09-03 RX ADMIN — QUETIAPINE FUMARATE 50 MILLIGRAM(S): 200 TABLET, FILM COATED ORAL at 06:45

## 2018-09-03 RX ADMIN — Medication 0.5 MILLIGRAM(S): at 13:18

## 2018-09-04 PROCEDURE — 99232 SBSQ HOSP IP/OBS MODERATE 35: CPT

## 2018-09-04 RX ORDER — QUETIAPINE FUMARATE 200 MG/1
75 TABLET, FILM COATED ORAL
Qty: 0 | Refills: 0 | Status: DISCONTINUED | OUTPATIENT
Start: 2018-09-04 | End: 2018-09-06

## 2018-09-04 RX ADMIN — Medication 3 MILLIGRAM(S): at 20:30

## 2018-09-04 RX ADMIN — POLYETHYLENE GLYCOL 3350 17 GRAM(S): 17 POWDER, FOR SOLUTION ORAL at 13:31

## 2018-09-04 RX ADMIN — LETROZOLE 2.5 MILLIGRAM(S): 2.5 TABLET, FILM COATED ORAL at 10:40

## 2018-09-04 RX ADMIN — Medication 0.5 MILLIGRAM(S): at 06:02

## 2018-09-04 RX ADMIN — Medication 0.5 MILLIGRAM(S): at 13:01

## 2018-09-04 RX ADMIN — Medication 100 MILLIGRAM(S): at 13:31

## 2018-09-04 RX ADMIN — Medication 0.5 MILLIGRAM(S): at 20:30

## 2018-09-04 RX ADMIN — QUETIAPINE FUMARATE 50 MILLIGRAM(S): 200 TABLET, FILM COATED ORAL at 13:01

## 2018-09-04 RX ADMIN — QUETIAPINE FUMARATE 50 MILLIGRAM(S): 200 TABLET, FILM COATED ORAL at 06:02

## 2018-09-04 RX ADMIN — SERTRALINE 200 MILLIGRAM(S): 25 TABLET, FILM COATED ORAL at 06:02

## 2018-09-04 RX ADMIN — QUETIAPINE FUMARATE 100 MILLIGRAM(S): 200 TABLET, FILM COATED ORAL at 20:30

## 2018-09-04 RX ADMIN — Medication 25 MILLIGRAM(S): at 16:30

## 2018-09-04 RX ADMIN — Medication 50 MILLIGRAM(S): at 20:30

## 2018-09-04 RX ADMIN — Medication 1 APPLICATION(S): at 20:47

## 2018-09-05 PROCEDURE — 99232 SBSQ HOSP IP/OBS MODERATE 35: CPT

## 2018-09-05 RX ADMIN — Medication 1 APPLICATION(S): at 21:27

## 2018-09-05 RX ADMIN — Medication 0.5 MILLIGRAM(S): at 20:33

## 2018-09-05 RX ADMIN — SERTRALINE 200 MILLIGRAM(S): 25 TABLET, FILM COATED ORAL at 06:38

## 2018-09-05 RX ADMIN — Medication 50 MILLIGRAM(S): at 20:34

## 2018-09-05 RX ADMIN — LETROZOLE 2.5 MILLIGRAM(S): 2.5 TABLET, FILM COATED ORAL at 09:00

## 2018-09-05 RX ADMIN — Medication 0.5 MILLIGRAM(S): at 06:38

## 2018-09-05 RX ADMIN — Medication 0.5 MILLIGRAM(S): at 13:01

## 2018-09-05 RX ADMIN — QUETIAPINE FUMARATE 75 MILLIGRAM(S): 200 TABLET, FILM COATED ORAL at 13:01

## 2018-09-05 RX ADMIN — QUETIAPINE FUMARATE 75 MILLIGRAM(S): 200 TABLET, FILM COATED ORAL at 06:38

## 2018-09-05 RX ADMIN — QUETIAPINE FUMARATE 100 MILLIGRAM(S): 200 TABLET, FILM COATED ORAL at 20:34

## 2018-09-05 RX ADMIN — Medication 3 MILLIGRAM(S): at 20:33

## 2018-09-06 PROCEDURE — 99232 SBSQ HOSP IP/OBS MODERATE 35: CPT

## 2018-09-06 RX ORDER — QUETIAPINE FUMARATE 200 MG/1
100 TABLET, FILM COATED ORAL
Qty: 0 | Refills: 0 | Status: DISCONTINUED | OUTPATIENT
Start: 2018-09-06 | End: 2018-09-10

## 2018-09-06 RX ADMIN — LETROZOLE 2.5 MILLIGRAM(S): 2.5 TABLET, FILM COATED ORAL at 08:01

## 2018-09-06 RX ADMIN — Medication 0.5 MILLIGRAM(S): at 20:47

## 2018-09-06 RX ADMIN — Medication 0.5 MILLIGRAM(S): at 12:39

## 2018-09-06 RX ADMIN — Medication 50 MILLIGRAM(S): at 20:47

## 2018-09-06 RX ADMIN — QUETIAPINE FUMARATE 100 MILLIGRAM(S): 200 TABLET, FILM COATED ORAL at 20:47

## 2018-09-06 RX ADMIN — QUETIAPINE FUMARATE 100 MILLIGRAM(S): 200 TABLET, FILM COATED ORAL at 12:39

## 2018-09-06 RX ADMIN — Medication 650 MILLIGRAM(S): at 21:45

## 2018-09-06 RX ADMIN — QUETIAPINE FUMARATE 75 MILLIGRAM(S): 200 TABLET, FILM COATED ORAL at 06:36

## 2018-09-06 RX ADMIN — Medication 650 MILLIGRAM(S): at 20:59

## 2018-09-06 RX ADMIN — SERTRALINE 200 MILLIGRAM(S): 25 TABLET, FILM COATED ORAL at 06:36

## 2018-09-06 RX ADMIN — Medication 0.5 MILLIGRAM(S): at 06:36

## 2018-09-06 RX ADMIN — Medication 3 MILLIGRAM(S): at 20:47

## 2018-09-07 PROCEDURE — 99232 SBSQ HOSP IP/OBS MODERATE 35: CPT

## 2018-09-07 RX ADMIN — QUETIAPINE FUMARATE 100 MILLIGRAM(S): 200 TABLET, FILM COATED ORAL at 20:38

## 2018-09-07 RX ADMIN — SERTRALINE 200 MILLIGRAM(S): 25 TABLET, FILM COATED ORAL at 05:35

## 2018-09-07 RX ADMIN — Medication 50 MILLIGRAM(S): at 20:38

## 2018-09-07 RX ADMIN — LETROZOLE 2.5 MILLIGRAM(S): 2.5 TABLET, FILM COATED ORAL at 08:14

## 2018-09-07 RX ADMIN — Medication 0.5 MILLIGRAM(S): at 12:27

## 2018-09-07 RX ADMIN — Medication 0.5 MILLIGRAM(S): at 20:38

## 2018-09-07 RX ADMIN — QUETIAPINE FUMARATE 100 MILLIGRAM(S): 200 TABLET, FILM COATED ORAL at 05:35

## 2018-09-07 RX ADMIN — Medication 0.5 MILLIGRAM(S): at 05:35

## 2018-09-07 RX ADMIN — Medication 3 MILLIGRAM(S): at 20:38

## 2018-09-07 RX ADMIN — QUETIAPINE FUMARATE 100 MILLIGRAM(S): 200 TABLET, FILM COATED ORAL at 12:27

## 2018-09-08 RX ADMIN — SERTRALINE 200 MILLIGRAM(S): 25 TABLET, FILM COATED ORAL at 06:14

## 2018-09-08 RX ADMIN — QUETIAPINE FUMARATE 100 MILLIGRAM(S): 200 TABLET, FILM COATED ORAL at 13:24

## 2018-09-08 RX ADMIN — Medication 650 MILLIGRAM(S): at 05:13

## 2018-09-08 RX ADMIN — Medication 3 MILLIGRAM(S): at 21:07

## 2018-09-08 RX ADMIN — QUETIAPINE FUMARATE 100 MILLIGRAM(S): 200 TABLET, FILM COATED ORAL at 21:07

## 2018-09-08 RX ADMIN — LETROZOLE 2.5 MILLIGRAM(S): 2.5 TABLET, FILM COATED ORAL at 09:50

## 2018-09-08 RX ADMIN — Medication 50 MILLIGRAM(S): at 21:07

## 2018-09-08 RX ADMIN — QUETIAPINE FUMARATE 100 MILLIGRAM(S): 200 TABLET, FILM COATED ORAL at 06:14

## 2018-09-08 RX ADMIN — Medication 650 MILLIGRAM(S): at 05:44

## 2018-09-09 RX ADMIN — Medication 3 MILLIGRAM(S): at 20:40

## 2018-09-09 RX ADMIN — Medication 50 MILLIGRAM(S): at 20:40

## 2018-09-09 RX ADMIN — SERTRALINE 200 MILLIGRAM(S): 25 TABLET, FILM COATED ORAL at 05:54

## 2018-09-09 RX ADMIN — Medication 650 MILLIGRAM(S): at 05:54

## 2018-09-09 RX ADMIN — QUETIAPINE FUMARATE 100 MILLIGRAM(S): 200 TABLET, FILM COATED ORAL at 14:23

## 2018-09-09 RX ADMIN — Medication 25 MILLIGRAM(S): at 10:20

## 2018-09-09 RX ADMIN — Medication 650 MILLIGRAM(S): at 05:25

## 2018-09-09 RX ADMIN — QUETIAPINE FUMARATE 100 MILLIGRAM(S): 200 TABLET, FILM COATED ORAL at 20:40

## 2018-09-09 RX ADMIN — LETROZOLE 2.5 MILLIGRAM(S): 2.5 TABLET, FILM COATED ORAL at 10:21

## 2018-09-09 RX ADMIN — QUETIAPINE FUMARATE 100 MILLIGRAM(S): 200 TABLET, FILM COATED ORAL at 05:54

## 2018-09-10 PROCEDURE — 99232 SBSQ HOSP IP/OBS MODERATE 35: CPT

## 2018-09-10 RX ORDER — QUETIAPINE FUMARATE 200 MG/1
50 TABLET, FILM COATED ORAL
Qty: 0 | Refills: 0 | Status: DISCONTINUED | OUTPATIENT
Start: 2018-09-10 | End: 2018-09-12

## 2018-09-10 RX ADMIN — Medication 3 MILLIGRAM(S): at 20:43

## 2018-09-10 RX ADMIN — SERTRALINE 200 MILLIGRAM(S): 25 TABLET, FILM COATED ORAL at 06:13

## 2018-09-10 RX ADMIN — QUETIAPINE FUMARATE 100 MILLIGRAM(S): 200 TABLET, FILM COATED ORAL at 06:13

## 2018-09-10 RX ADMIN — QUETIAPINE FUMARATE 100 MILLIGRAM(S): 200 TABLET, FILM COATED ORAL at 21:01

## 2018-09-10 RX ADMIN — LETROZOLE 2.5 MILLIGRAM(S): 2.5 TABLET, FILM COATED ORAL at 08:38

## 2018-09-10 RX ADMIN — Medication 50 MILLIGRAM(S): at 20:43

## 2018-09-10 RX ADMIN — QUETIAPINE FUMARATE 50 MILLIGRAM(S): 200 TABLET, FILM COATED ORAL at 12:02

## 2018-09-11 LAB
ALBUMIN SERPL ELPH-MCNC: 4.1 G/DL — SIGNIFICANT CHANGE UP (ref 3.3–5)
ALP SERPL-CCNC: 116 U/L — SIGNIFICANT CHANGE UP (ref 40–120)
ALT FLD-CCNC: 20 U/L — SIGNIFICANT CHANGE UP (ref 4–33)
AST SERPL-CCNC: 21 U/L — SIGNIFICANT CHANGE UP (ref 4–32)
BILIRUB SERPL-MCNC: 0.5 MG/DL — SIGNIFICANT CHANGE UP (ref 0.2–1.2)
BUN SERPL-MCNC: 23 MG/DL — SIGNIFICANT CHANGE UP (ref 7–23)
CALCIUM SERPL-MCNC: 9.4 MG/DL — SIGNIFICANT CHANGE UP (ref 8.4–10.5)
CHLORIDE SERPL-SCNC: 99 MMOL/L — SIGNIFICANT CHANGE UP (ref 98–107)
CO2 SERPL-SCNC: 22 MMOL/L — SIGNIFICANT CHANGE UP (ref 22–31)
CREAT SERPL-MCNC: 0.75 MG/DL — SIGNIFICANT CHANGE UP (ref 0.5–1.3)
GLUCOSE SERPL-MCNC: 112 MG/DL — HIGH (ref 70–99)
HCT VFR BLD CALC: 34.7 % — SIGNIFICANT CHANGE UP (ref 34.5–45)
HGB BLD-MCNC: 11.6 G/DL — SIGNIFICANT CHANGE UP (ref 11.5–15.5)
MCHC RBC-ENTMCNC: 28.7 PG — SIGNIFICANT CHANGE UP (ref 27–34)
MCHC RBC-ENTMCNC: 33.4 % — SIGNIFICANT CHANGE UP (ref 32–36)
MCV RBC AUTO: 85.9 FL — SIGNIFICANT CHANGE UP (ref 80–100)
NRBC # FLD: 0 — SIGNIFICANT CHANGE UP
PLATELET # BLD AUTO: 157 K/UL — SIGNIFICANT CHANGE UP (ref 150–400)
PMV BLD: 9.3 FL — SIGNIFICANT CHANGE UP (ref 7–13)
POTASSIUM SERPL-MCNC: 3.9 MMOL/L — SIGNIFICANT CHANGE UP (ref 3.5–5.3)
POTASSIUM SERPL-SCNC: 3.9 MMOL/L — SIGNIFICANT CHANGE UP (ref 3.5–5.3)
PROT SERPL-MCNC: 7 G/DL — SIGNIFICANT CHANGE UP (ref 6–8.3)
RBC # BLD: 4.04 M/UL — SIGNIFICANT CHANGE UP (ref 3.8–5.2)
RBC # FLD: 13.2 % — SIGNIFICANT CHANGE UP (ref 10.3–14.5)
SODIUM SERPL-SCNC: 134 MMOL/L — LOW (ref 135–145)
WBC # BLD: 4.75 K/UL — SIGNIFICANT CHANGE UP (ref 3.8–10.5)
WBC # FLD AUTO: 4.75 K/UL — SIGNIFICANT CHANGE UP (ref 3.8–10.5)

## 2018-09-11 PROCEDURE — 99232 SBSQ HOSP IP/OBS MODERATE 35: CPT

## 2018-09-11 RX ORDER — SERTRALINE 25 MG/1
150 TABLET, FILM COATED ORAL
Qty: 0 | Refills: 0 | Status: DISCONTINUED | OUTPATIENT
Start: 2018-09-11 | End: 2018-10-03

## 2018-09-11 RX ADMIN — Medication 3 MILLIGRAM(S): at 20:56

## 2018-09-11 RX ADMIN — QUETIAPINE FUMARATE 100 MILLIGRAM(S): 200 TABLET, FILM COATED ORAL at 20:56

## 2018-09-11 RX ADMIN — Medication 0.5 MILLIGRAM(S): at 12:20

## 2018-09-11 RX ADMIN — SERTRALINE 200 MILLIGRAM(S): 25 TABLET, FILM COATED ORAL at 05:45

## 2018-09-11 RX ADMIN — QUETIAPINE FUMARATE 50 MILLIGRAM(S): 200 TABLET, FILM COATED ORAL at 12:20

## 2018-09-11 RX ADMIN — QUETIAPINE FUMARATE 50 MILLIGRAM(S): 200 TABLET, FILM COATED ORAL at 05:45

## 2018-09-11 RX ADMIN — Medication 25 MILLIGRAM(S): at 21:49

## 2018-09-11 RX ADMIN — LETROZOLE 2.5 MILLIGRAM(S): 2.5 TABLET, FILM COATED ORAL at 08:05

## 2018-09-11 RX ADMIN — Medication 50 MILLIGRAM(S): at 20:56

## 2018-09-12 PROCEDURE — 99232 SBSQ HOSP IP/OBS MODERATE 35: CPT

## 2018-09-12 RX ORDER — QUETIAPINE FUMARATE 200 MG/1
100 TABLET, FILM COATED ORAL
Qty: 0 | Refills: 0 | Status: DISCONTINUED | OUTPATIENT
Start: 2018-09-12 | End: 2018-10-03

## 2018-09-12 RX ADMIN — QUETIAPINE FUMARATE 50 MILLIGRAM(S): 200 TABLET, FILM COATED ORAL at 05:45

## 2018-09-12 RX ADMIN — QUETIAPINE FUMARATE 100 MILLIGRAM(S): 200 TABLET, FILM COATED ORAL at 12:06

## 2018-09-12 RX ADMIN — LETROZOLE 2.5 MILLIGRAM(S): 2.5 TABLET, FILM COATED ORAL at 08:09

## 2018-09-12 RX ADMIN — Medication 0.5 MILLIGRAM(S): at 05:45

## 2018-09-12 RX ADMIN — Medication 0.5 MILLIGRAM(S): at 12:06

## 2018-09-12 RX ADMIN — SERTRALINE 150 MILLIGRAM(S): 25 TABLET, FILM COATED ORAL at 05:45

## 2018-09-12 RX ADMIN — Medication 3 MILLIGRAM(S): at 20:56

## 2018-09-12 RX ADMIN — QUETIAPINE FUMARATE 100 MILLIGRAM(S): 200 TABLET, FILM COATED ORAL at 20:56

## 2018-09-12 RX ADMIN — Medication 50 MILLIGRAM(S): at 20:56

## 2018-09-13 PROCEDURE — 99232 SBSQ HOSP IP/OBS MODERATE 35: CPT

## 2018-09-13 RX ORDER — ACETAMINOPHEN 500 MG
650 TABLET ORAL EVERY 6 HOURS
Qty: 0 | Refills: 0 | Status: DISCONTINUED | OUTPATIENT
Start: 2018-09-13 | End: 2018-09-30

## 2018-09-13 RX ADMIN — QUETIAPINE FUMARATE 100 MILLIGRAM(S): 200 TABLET, FILM COATED ORAL at 05:49

## 2018-09-13 RX ADMIN — QUETIAPINE FUMARATE 100 MILLIGRAM(S): 200 TABLET, FILM COATED ORAL at 13:13

## 2018-09-13 RX ADMIN — Medication 0.5 MILLIGRAM(S): at 13:13

## 2018-09-13 RX ADMIN — SERTRALINE 150 MILLIGRAM(S): 25 TABLET, FILM COATED ORAL at 05:49

## 2018-09-13 RX ADMIN — Medication 0.5 MILLIGRAM(S): at 05:49

## 2018-09-13 RX ADMIN — Medication 50 MILLIGRAM(S): at 20:34

## 2018-09-13 RX ADMIN — LETROZOLE 2.5 MILLIGRAM(S): 2.5 TABLET, FILM COATED ORAL at 10:11

## 2018-09-13 RX ADMIN — QUETIAPINE FUMARATE 100 MILLIGRAM(S): 200 TABLET, FILM COATED ORAL at 20:34

## 2018-09-13 RX ADMIN — Medication 3 MILLIGRAM(S): at 20:34

## 2018-09-13 RX ADMIN — Medication 100 MILLIGRAM(S): at 15:26

## 2018-09-14 PROCEDURE — 99232 SBSQ HOSP IP/OBS MODERATE 35: CPT

## 2018-09-14 RX ORDER — BENZOCAINE 10 %
1 GEL (GRAM) MUCOUS MEMBRANE
Qty: 0 | Refills: 0 | Status: DISCONTINUED | OUTPATIENT
Start: 2018-09-14 | End: 2018-10-02

## 2018-09-14 RX ADMIN — Medication 1 APPLICATION(S): at 21:25

## 2018-09-14 RX ADMIN — SERTRALINE 150 MILLIGRAM(S): 25 TABLET, FILM COATED ORAL at 06:34

## 2018-09-14 RX ADMIN — Medication 3 MILLIGRAM(S): at 21:25

## 2018-09-14 RX ADMIN — QUETIAPINE FUMARATE 100 MILLIGRAM(S): 200 TABLET, FILM COATED ORAL at 06:34

## 2018-09-14 RX ADMIN — QUETIAPINE FUMARATE 100 MILLIGRAM(S): 200 TABLET, FILM COATED ORAL at 12:52

## 2018-09-14 RX ADMIN — LETROZOLE 2.5 MILLIGRAM(S): 2.5 TABLET, FILM COATED ORAL at 12:52

## 2018-09-14 RX ADMIN — Medication 50 MILLIGRAM(S): at 21:25

## 2018-09-14 RX ADMIN — Medication 0.5 MILLIGRAM(S): at 06:34

## 2018-09-14 RX ADMIN — Medication 0.5 MILLIGRAM(S): at 12:52

## 2018-09-14 RX ADMIN — QUETIAPINE FUMARATE 100 MILLIGRAM(S): 200 TABLET, FILM COATED ORAL at 21:25

## 2018-09-15 PROCEDURE — 99231 SBSQ HOSP IP/OBS SF/LOW 25: CPT

## 2018-09-15 RX ADMIN — Medication 1 APPLICATION(S): at 20:48

## 2018-09-15 RX ADMIN — Medication 1 APPLICATION(S): at 08:36

## 2018-09-15 RX ADMIN — QUETIAPINE FUMARATE 100 MILLIGRAM(S): 200 TABLET, FILM COATED ORAL at 20:48

## 2018-09-15 RX ADMIN — SERTRALINE 150 MILLIGRAM(S): 25 TABLET, FILM COATED ORAL at 06:32

## 2018-09-15 RX ADMIN — Medication 3 MILLIGRAM(S): at 20:48

## 2018-09-15 RX ADMIN — LETROZOLE 2.5 MILLIGRAM(S): 2.5 TABLET, FILM COATED ORAL at 08:51

## 2018-09-15 RX ADMIN — Medication 50 MILLIGRAM(S): at 20:48

## 2018-09-15 RX ADMIN — QUETIAPINE FUMARATE 100 MILLIGRAM(S): 200 TABLET, FILM COATED ORAL at 06:32

## 2018-09-15 RX ADMIN — Medication 0.5 MILLIGRAM(S): at 12:44

## 2018-09-15 RX ADMIN — QUETIAPINE FUMARATE 100 MILLIGRAM(S): 200 TABLET, FILM COATED ORAL at 12:45

## 2018-09-15 RX ADMIN — Medication 0.5 MILLIGRAM(S): at 06:32

## 2018-09-16 PROCEDURE — 99231 SBSQ HOSP IP/OBS SF/LOW 25: CPT

## 2018-09-16 RX ADMIN — Medication 3 MILLIGRAM(S): at 20:50

## 2018-09-16 RX ADMIN — QUETIAPINE FUMARATE 100 MILLIGRAM(S): 200 TABLET, FILM COATED ORAL at 12:32

## 2018-09-16 RX ADMIN — Medication 0.5 MILLIGRAM(S): at 06:02

## 2018-09-16 RX ADMIN — Medication 1 APPLICATION(S): at 09:27

## 2018-09-16 RX ADMIN — Medication 0.5 MILLIGRAM(S): at 12:32

## 2018-09-16 RX ADMIN — LETROZOLE 2.5 MILLIGRAM(S): 2.5 TABLET, FILM COATED ORAL at 09:27

## 2018-09-16 RX ADMIN — Medication 50 MILLIGRAM(S): at 20:50

## 2018-09-16 RX ADMIN — Medication 1 APPLICATION(S): at 20:50

## 2018-09-16 RX ADMIN — SERTRALINE 150 MILLIGRAM(S): 25 TABLET, FILM COATED ORAL at 06:02

## 2018-09-16 RX ADMIN — QUETIAPINE FUMARATE 100 MILLIGRAM(S): 200 TABLET, FILM COATED ORAL at 20:50

## 2018-09-16 RX ADMIN — QUETIAPINE FUMARATE 100 MILLIGRAM(S): 200 TABLET, FILM COATED ORAL at 06:02

## 2018-09-17 PROCEDURE — 99232 SBSQ HOSP IP/OBS MODERATE 35: CPT

## 2018-09-17 RX ADMIN — QUETIAPINE FUMARATE 100 MILLIGRAM(S): 200 TABLET, FILM COATED ORAL at 13:03

## 2018-09-17 RX ADMIN — Medication 1 APPLICATION(S): at 11:25

## 2018-09-17 RX ADMIN — QUETIAPINE FUMARATE 100 MILLIGRAM(S): 200 TABLET, FILM COATED ORAL at 06:15

## 2018-09-17 RX ADMIN — Medication 50 MILLIGRAM(S): at 20:13

## 2018-09-17 RX ADMIN — QUETIAPINE FUMARATE 100 MILLIGRAM(S): 200 TABLET, FILM COATED ORAL at 20:13

## 2018-09-17 RX ADMIN — LETROZOLE 2.5 MILLIGRAM(S): 2.5 TABLET, FILM COATED ORAL at 11:25

## 2018-09-17 RX ADMIN — Medication 3 MILLIGRAM(S): at 20:13

## 2018-09-17 RX ADMIN — SERTRALINE 150 MILLIGRAM(S): 25 TABLET, FILM COATED ORAL at 06:15

## 2018-09-17 RX ADMIN — Medication 0.5 MILLIGRAM(S): at 13:03

## 2018-09-17 RX ADMIN — Medication 0.5 MILLIGRAM(S): at 06:15

## 2018-09-17 RX ADMIN — Medication 1 APPLICATION(S): at 20:13

## 2018-09-18 PROCEDURE — 99232 SBSQ HOSP IP/OBS MODERATE 35: CPT

## 2018-09-18 RX ADMIN — SERTRALINE 150 MILLIGRAM(S): 25 TABLET, FILM COATED ORAL at 06:22

## 2018-09-18 RX ADMIN — QUETIAPINE FUMARATE 100 MILLIGRAM(S): 200 TABLET, FILM COATED ORAL at 06:22

## 2018-09-18 RX ADMIN — Medication 1 APPLICATION(S): at 09:09

## 2018-09-18 RX ADMIN — Medication 0.5 MILLIGRAM(S): at 06:22

## 2018-09-18 RX ADMIN — LETROZOLE 2.5 MILLIGRAM(S): 2.5 TABLET, FILM COATED ORAL at 09:09

## 2018-09-18 RX ADMIN — Medication 3 MILLIGRAM(S): at 21:26

## 2018-09-18 RX ADMIN — Medication 650 MILLIGRAM(S): at 23:30

## 2018-09-18 RX ADMIN — QUETIAPINE FUMARATE 100 MILLIGRAM(S): 200 TABLET, FILM COATED ORAL at 21:26

## 2018-09-18 RX ADMIN — Medication 0.5 MILLIGRAM(S): at 12:35

## 2018-09-18 RX ADMIN — Medication 50 MILLIGRAM(S): at 21:26

## 2018-09-18 RX ADMIN — Medication 1 APPLICATION(S): at 21:26

## 2018-09-18 RX ADMIN — Medication 25 MILLIGRAM(S): at 17:23

## 2018-09-18 RX ADMIN — QUETIAPINE FUMARATE 100 MILLIGRAM(S): 200 TABLET, FILM COATED ORAL at 12:35

## 2018-09-19 PROCEDURE — 99232 SBSQ HOSP IP/OBS MODERATE 35: CPT

## 2018-09-19 RX ADMIN — QUETIAPINE FUMARATE 100 MILLIGRAM(S): 200 TABLET, FILM COATED ORAL at 06:41

## 2018-09-19 RX ADMIN — SERTRALINE 150 MILLIGRAM(S): 25 TABLET, FILM COATED ORAL at 06:41

## 2018-09-19 RX ADMIN — Medication 1 APPLICATION(S): at 09:25

## 2018-09-19 RX ADMIN — Medication 50 MILLIGRAM(S): at 21:26

## 2018-09-19 RX ADMIN — Medication 0.5 MILLIGRAM(S): at 09:26

## 2018-09-19 RX ADMIN — Medication 25 MILLIGRAM(S): at 16:00

## 2018-09-19 RX ADMIN — Medication 1 APPLICATION(S): at 21:26

## 2018-09-19 RX ADMIN — LETROZOLE 2.5 MILLIGRAM(S): 2.5 TABLET, FILM COATED ORAL at 12:48

## 2018-09-19 RX ADMIN — QUETIAPINE FUMARATE 100 MILLIGRAM(S): 200 TABLET, FILM COATED ORAL at 12:58

## 2018-09-19 RX ADMIN — QUETIAPINE FUMARATE 100 MILLIGRAM(S): 200 TABLET, FILM COATED ORAL at 21:26

## 2018-09-19 RX ADMIN — Medication 650 MILLIGRAM(S): at 01:51

## 2018-09-19 RX ADMIN — Medication 3 MILLIGRAM(S): at 21:26

## 2018-09-19 RX ADMIN — Medication 0.5 MILLIGRAM(S): at 12:58

## 2018-09-20 PROCEDURE — 99232 SBSQ HOSP IP/OBS MODERATE 35: CPT

## 2018-09-20 RX ADMIN — Medication 3 MILLIGRAM(S): at 20:48

## 2018-09-20 RX ADMIN — Medication 50 MILLIGRAM(S): at 20:48

## 2018-09-20 RX ADMIN — Medication 1 APPLICATION(S): at 20:48

## 2018-09-20 RX ADMIN — QUETIAPINE FUMARATE 100 MILLIGRAM(S): 200 TABLET, FILM COATED ORAL at 20:48

## 2018-09-20 RX ADMIN — SERTRALINE 150 MILLIGRAM(S): 25 TABLET, FILM COATED ORAL at 06:42

## 2018-09-20 RX ADMIN — QUETIAPINE FUMARATE 100 MILLIGRAM(S): 200 TABLET, FILM COATED ORAL at 13:04

## 2018-09-20 RX ADMIN — LETROZOLE 2.5 MILLIGRAM(S): 2.5 TABLET, FILM COATED ORAL at 09:36

## 2018-09-20 RX ADMIN — QUETIAPINE FUMARATE 100 MILLIGRAM(S): 200 TABLET, FILM COATED ORAL at 06:42

## 2018-09-20 RX ADMIN — Medication 1 APPLICATION(S): at 09:37

## 2018-09-20 RX ADMIN — Medication 1 MILLIGRAM(S): at 13:04

## 2018-09-20 RX ADMIN — Medication 0.5 MILLIGRAM(S): at 06:42

## 2018-09-21 LAB
ALBUMIN SERPL ELPH-MCNC: 4.2 G/DL — SIGNIFICANT CHANGE UP (ref 3.3–5)
ALP SERPL-CCNC: 128 U/L — HIGH (ref 40–120)
ALT FLD-CCNC: 25 U/L — SIGNIFICANT CHANGE UP (ref 4–33)
AST SERPL-CCNC: 21 U/L — SIGNIFICANT CHANGE UP (ref 4–32)
BILIRUB SERPL-MCNC: 0.5 MG/DL — SIGNIFICANT CHANGE UP (ref 0.2–1.2)
BUN SERPL-MCNC: 25 MG/DL — HIGH (ref 7–23)
CALCIUM SERPL-MCNC: 9.3 MG/DL — SIGNIFICANT CHANGE UP (ref 8.4–10.5)
CHLORIDE SERPL-SCNC: 99 MMOL/L — SIGNIFICANT CHANGE UP (ref 98–107)
CO2 SERPL-SCNC: 25 MMOL/L — SIGNIFICANT CHANGE UP (ref 22–31)
CREAT SERPL-MCNC: 0.84 MG/DL — SIGNIFICANT CHANGE UP (ref 0.5–1.3)
GLUCOSE SERPL-MCNC: 110 MG/DL — HIGH (ref 70–99)
POTASSIUM SERPL-MCNC: 4.4 MMOL/L — SIGNIFICANT CHANGE UP (ref 3.5–5.3)
POTASSIUM SERPL-SCNC: 4.4 MMOL/L — SIGNIFICANT CHANGE UP (ref 3.5–5.3)
PROT SERPL-MCNC: 7.4 G/DL — SIGNIFICANT CHANGE UP (ref 6–8.3)
SODIUM SERPL-SCNC: 136 MMOL/L — SIGNIFICANT CHANGE UP (ref 135–145)

## 2018-09-21 PROCEDURE — 99232 SBSQ HOSP IP/OBS MODERATE 35: CPT

## 2018-09-21 RX ADMIN — QUETIAPINE FUMARATE 100 MILLIGRAM(S): 200 TABLET, FILM COATED ORAL at 06:45

## 2018-09-21 RX ADMIN — Medication 1 MILLIGRAM(S): at 11:42

## 2018-09-21 RX ADMIN — Medication 3 MILLIGRAM(S): at 20:41

## 2018-09-21 RX ADMIN — Medication 650 MILLIGRAM(S): at 03:05

## 2018-09-21 RX ADMIN — Medication 650 MILLIGRAM(S): at 16:35

## 2018-09-21 RX ADMIN — SERTRALINE 150 MILLIGRAM(S): 25 TABLET, FILM COATED ORAL at 06:45

## 2018-09-21 RX ADMIN — Medication 30 MILLILITER(S): at 16:36

## 2018-09-21 RX ADMIN — Medication 1 APPLICATION(S): at 23:10

## 2018-09-21 RX ADMIN — Medication 50 MILLIGRAM(S): at 20:41

## 2018-09-21 RX ADMIN — Medication 1 MILLIGRAM(S): at 06:45

## 2018-09-21 RX ADMIN — LETROZOLE 2.5 MILLIGRAM(S): 2.5 TABLET, FILM COATED ORAL at 09:04

## 2018-09-21 RX ADMIN — Medication 650 MILLIGRAM(S): at 03:48

## 2018-09-21 RX ADMIN — Medication 650 MILLIGRAM(S): at 17:34

## 2018-09-21 RX ADMIN — Medication 1 APPLICATION(S): at 09:04

## 2018-09-21 RX ADMIN — QUETIAPINE FUMARATE 100 MILLIGRAM(S): 200 TABLET, FILM COATED ORAL at 20:41

## 2018-09-21 RX ADMIN — QUETIAPINE FUMARATE 100 MILLIGRAM(S): 200 TABLET, FILM COATED ORAL at 12:45

## 2018-09-22 RX ADMIN — Medication 1 MILLIGRAM(S): at 06:09

## 2018-09-22 RX ADMIN — Medication 50 MILLIGRAM(S): at 20:54

## 2018-09-22 RX ADMIN — Medication 3 MILLIGRAM(S): at 20:54

## 2018-09-22 RX ADMIN — QUETIAPINE FUMARATE 100 MILLIGRAM(S): 200 TABLET, FILM COATED ORAL at 20:54

## 2018-09-22 RX ADMIN — QUETIAPINE FUMARATE 100 MILLIGRAM(S): 200 TABLET, FILM COATED ORAL at 12:37

## 2018-09-22 RX ADMIN — Medication 1 APPLICATION(S): at 20:54

## 2018-09-22 RX ADMIN — Medication 1 APPLICATION(S): at 09:45

## 2018-09-22 RX ADMIN — Medication 1 MILLIGRAM(S): at 12:37

## 2018-09-22 RX ADMIN — SERTRALINE 150 MILLIGRAM(S): 25 TABLET, FILM COATED ORAL at 06:09

## 2018-09-22 RX ADMIN — QUETIAPINE FUMARATE 100 MILLIGRAM(S): 200 TABLET, FILM COATED ORAL at 06:09

## 2018-09-22 RX ADMIN — LETROZOLE 2.5 MILLIGRAM(S): 2.5 TABLET, FILM COATED ORAL at 09:45

## 2018-09-23 RX ADMIN — SERTRALINE 150 MILLIGRAM(S): 25 TABLET, FILM COATED ORAL at 06:14

## 2018-09-23 RX ADMIN — QUETIAPINE FUMARATE 100 MILLIGRAM(S): 200 TABLET, FILM COATED ORAL at 06:14

## 2018-09-23 RX ADMIN — Medication 50 MILLIGRAM(S): at 21:33

## 2018-09-23 RX ADMIN — Medication 30 MILLILITER(S): at 13:57

## 2018-09-23 RX ADMIN — QUETIAPINE FUMARATE 100 MILLIGRAM(S): 200 TABLET, FILM COATED ORAL at 21:33

## 2018-09-23 RX ADMIN — Medication 1 APPLICATION(S): at 21:33

## 2018-09-23 RX ADMIN — Medication 1 MILLIGRAM(S): at 10:41

## 2018-09-23 RX ADMIN — Medication 1 MILLIGRAM(S): at 06:14

## 2018-09-23 RX ADMIN — QUETIAPINE FUMARATE 100 MILLIGRAM(S): 200 TABLET, FILM COATED ORAL at 13:58

## 2018-09-23 RX ADMIN — LETROZOLE 2.5 MILLIGRAM(S): 2.5 TABLET, FILM COATED ORAL at 10:41

## 2018-09-23 RX ADMIN — Medication 3 MILLIGRAM(S): at 21:33

## 2018-09-23 RX ADMIN — Medication 1 APPLICATION(S): at 10:11

## 2018-09-24 PROCEDURE — 99232 SBSQ HOSP IP/OBS MODERATE 35: CPT

## 2018-09-24 RX ADMIN — Medication 1 MILLIGRAM(S): at 06:36

## 2018-09-24 RX ADMIN — QUETIAPINE FUMARATE 100 MILLIGRAM(S): 200 TABLET, FILM COATED ORAL at 06:36

## 2018-09-24 RX ADMIN — Medication 3 MILLIGRAM(S): at 21:47

## 2018-09-24 RX ADMIN — QUETIAPINE FUMARATE 100 MILLIGRAM(S): 200 TABLET, FILM COATED ORAL at 21:47

## 2018-09-24 RX ADMIN — Medication 1 APPLICATION(S): at 21:47

## 2018-09-24 RX ADMIN — QUETIAPINE FUMARATE 100 MILLIGRAM(S): 200 TABLET, FILM COATED ORAL at 13:21

## 2018-09-24 RX ADMIN — LETROZOLE 2.5 MILLIGRAM(S): 2.5 TABLET, FILM COATED ORAL at 09:19

## 2018-09-24 RX ADMIN — SERTRALINE 150 MILLIGRAM(S): 25 TABLET, FILM COATED ORAL at 06:36

## 2018-09-24 RX ADMIN — Medication 1 MILLIGRAM(S): at 12:33

## 2018-09-24 RX ADMIN — Medication 50 MILLIGRAM(S): at 21:47

## 2018-09-24 RX ADMIN — Medication 1 APPLICATION(S): at 09:19

## 2018-09-25 PROCEDURE — 99232 SBSQ HOSP IP/OBS MODERATE 35: CPT

## 2018-09-25 RX ADMIN — Medication 50 MILLIGRAM(S): at 20:42

## 2018-09-25 RX ADMIN — LETROZOLE 2.5 MILLIGRAM(S): 2.5 TABLET, FILM COATED ORAL at 09:02

## 2018-09-25 RX ADMIN — QUETIAPINE FUMARATE 100 MILLIGRAM(S): 200 TABLET, FILM COATED ORAL at 06:00

## 2018-09-25 RX ADMIN — SERTRALINE 150 MILLIGRAM(S): 25 TABLET, FILM COATED ORAL at 06:01

## 2018-09-25 RX ADMIN — Medication 1 MILLIGRAM(S): at 12:16

## 2018-09-25 RX ADMIN — Medication 1 APPLICATION(S): at 20:42

## 2018-09-25 RX ADMIN — Medication 1 MILLIGRAM(S): at 20:42

## 2018-09-25 RX ADMIN — QUETIAPINE FUMARATE 100 MILLIGRAM(S): 200 TABLET, FILM COATED ORAL at 12:16

## 2018-09-25 RX ADMIN — Medication 1 APPLICATION(S): at 09:01

## 2018-09-25 RX ADMIN — Medication 1 MILLIGRAM(S): at 06:00

## 2018-09-25 RX ADMIN — Medication 3 MILLIGRAM(S): at 20:42

## 2018-09-25 RX ADMIN — QUETIAPINE FUMARATE 100 MILLIGRAM(S): 200 TABLET, FILM COATED ORAL at 20:42

## 2018-09-26 PROCEDURE — 99232 SBSQ HOSP IP/OBS MODERATE 35: CPT

## 2018-09-26 RX ORDER — BENZOCAINE AND MENTHOL 5; 1 G/100ML; G/100ML
1 LIQUID ORAL EVERY 4 HOURS
Qty: 0 | Refills: 0 | Status: DISCONTINUED | OUTPATIENT
Start: 2018-09-26 | End: 2018-10-03

## 2018-09-26 RX ADMIN — Medication 50 MILLIGRAM(S): at 21:18

## 2018-09-26 RX ADMIN — QUETIAPINE FUMARATE 100 MILLIGRAM(S): 200 TABLET, FILM COATED ORAL at 21:18

## 2018-09-26 RX ADMIN — Medication 1 MILLIGRAM(S): at 06:07

## 2018-09-26 RX ADMIN — SERTRALINE 150 MILLIGRAM(S): 25 TABLET, FILM COATED ORAL at 06:07

## 2018-09-26 RX ADMIN — Medication 30 MILLILITER(S): at 15:21

## 2018-09-26 RX ADMIN — Medication 1 APPLICATION(S): at 09:12

## 2018-09-26 RX ADMIN — Medication 1 APPLICATION(S): at 21:03

## 2018-09-26 RX ADMIN — Medication 3 MILLIGRAM(S): at 21:18

## 2018-09-26 RX ADMIN — QUETIAPINE FUMARATE 100 MILLIGRAM(S): 200 TABLET, FILM COATED ORAL at 06:07

## 2018-09-26 RX ADMIN — Medication 1 MILLIGRAM(S): at 14:05

## 2018-09-26 RX ADMIN — QUETIAPINE FUMARATE 100 MILLIGRAM(S): 200 TABLET, FILM COATED ORAL at 14:05

## 2018-09-26 RX ADMIN — Medication 1 MILLIGRAM(S): at 21:18

## 2018-09-26 RX ADMIN — LETROZOLE 2.5 MILLIGRAM(S): 2.5 TABLET, FILM COATED ORAL at 09:54

## 2018-09-27 PROCEDURE — 99232 SBSQ HOSP IP/OBS MODERATE 35: CPT

## 2018-09-27 RX ADMIN — Medication 1 MILLIGRAM(S): at 12:24

## 2018-09-27 RX ADMIN — Medication 1 MILLIGRAM(S): at 06:35

## 2018-09-27 RX ADMIN — Medication 1 APPLICATION(S): at 10:23

## 2018-09-27 RX ADMIN — Medication 50 MILLIGRAM(S): at 20:56

## 2018-09-27 RX ADMIN — Medication 650 MILLIGRAM(S): at 06:43

## 2018-09-27 RX ADMIN — Medication 3 MILLIGRAM(S): at 20:56

## 2018-09-27 RX ADMIN — QUETIAPINE FUMARATE 100 MILLIGRAM(S): 200 TABLET, FILM COATED ORAL at 20:56

## 2018-09-27 RX ADMIN — BENZOCAINE AND MENTHOL 1 LOZENGE: 5; 1 LIQUID ORAL at 14:45

## 2018-09-27 RX ADMIN — LETROZOLE 2.5 MILLIGRAM(S): 2.5 TABLET, FILM COATED ORAL at 12:24

## 2018-09-27 RX ADMIN — Medication 1 APPLICATION(S): at 20:56

## 2018-09-27 RX ADMIN — SERTRALINE 150 MILLIGRAM(S): 25 TABLET, FILM COATED ORAL at 06:35

## 2018-09-27 RX ADMIN — BENZOCAINE AND MENTHOL 1 LOZENGE: 5; 1 LIQUID ORAL at 05:44

## 2018-09-27 RX ADMIN — QUETIAPINE FUMARATE 100 MILLIGRAM(S): 200 TABLET, FILM COATED ORAL at 06:35

## 2018-09-27 RX ADMIN — Medication 650 MILLIGRAM(S): at 05:52

## 2018-09-27 RX ADMIN — Medication 1 MILLIGRAM(S): at 20:56

## 2018-09-27 RX ADMIN — QUETIAPINE FUMARATE 100 MILLIGRAM(S): 200 TABLET, FILM COATED ORAL at 12:24

## 2018-09-28 PROCEDURE — 99232 SBSQ HOSP IP/OBS MODERATE 35: CPT

## 2018-09-28 RX ORDER — TUBERCULIN PURIFIED PROTEIN DERIVATIVE 5 [IU]/.1ML
5 INJECTION, SOLUTION INTRADERMAL ONCE
Qty: 0 | Refills: 0 | Status: COMPLETED | OUTPATIENT
Start: 2018-09-28 | End: 2018-09-28

## 2018-09-28 RX ADMIN — Medication 1 MILLIGRAM(S): at 06:22

## 2018-09-28 RX ADMIN — QUETIAPINE FUMARATE 100 MILLIGRAM(S): 200 TABLET, FILM COATED ORAL at 22:18

## 2018-09-28 RX ADMIN — Medication 50 MILLIGRAM(S): at 22:18

## 2018-09-28 RX ADMIN — BENZOCAINE AND MENTHOL 1 LOZENGE: 5; 1 LIQUID ORAL at 00:07

## 2018-09-28 RX ADMIN — SERTRALINE 150 MILLIGRAM(S): 25 TABLET, FILM COATED ORAL at 06:22

## 2018-09-28 RX ADMIN — LETROZOLE 2.5 MILLIGRAM(S): 2.5 TABLET, FILM COATED ORAL at 08:04

## 2018-09-28 RX ADMIN — Medication 1 MILLIGRAM(S): at 13:15

## 2018-09-28 RX ADMIN — Medication 1 MILLIGRAM(S): at 22:18

## 2018-09-28 RX ADMIN — TUBERCULIN PURIFIED PROTEIN DERIVATIVE 5 UNIT(S): 5 INJECTION, SOLUTION INTRADERMAL at 17:54

## 2018-09-28 RX ADMIN — Medication 1 APPLICATION(S): at 10:28

## 2018-09-28 RX ADMIN — Medication 1 APPLICATION(S): at 22:18

## 2018-09-28 RX ADMIN — Medication 3 MILLIGRAM(S): at 22:18

## 2018-09-28 RX ADMIN — QUETIAPINE FUMARATE 100 MILLIGRAM(S): 200 TABLET, FILM COATED ORAL at 13:16

## 2018-09-28 RX ADMIN — QUETIAPINE FUMARATE 100 MILLIGRAM(S): 200 TABLET, FILM COATED ORAL at 06:22

## 2018-09-29 RX ADMIN — Medication 100 MILLIGRAM(S): at 21:29

## 2018-09-29 RX ADMIN — Medication 1 MILLIGRAM(S): at 13:03

## 2018-09-29 RX ADMIN — QUETIAPINE FUMARATE 100 MILLIGRAM(S): 200 TABLET, FILM COATED ORAL at 13:03

## 2018-09-29 RX ADMIN — Medication 1 APPLICATION(S): at 09:25

## 2018-09-29 RX ADMIN — SERTRALINE 150 MILLIGRAM(S): 25 TABLET, FILM COATED ORAL at 07:03

## 2018-09-29 RX ADMIN — Medication 650 MILLIGRAM(S): at 12:05

## 2018-09-29 RX ADMIN — Medication 50 MILLIGRAM(S): at 20:47

## 2018-09-29 RX ADMIN — Medication 1 MILLIGRAM(S): at 20:47

## 2018-09-29 RX ADMIN — Medication 650 MILLIGRAM(S): at 12:54

## 2018-09-29 RX ADMIN — LETROZOLE 2.5 MILLIGRAM(S): 2.5 TABLET, FILM COATED ORAL at 09:25

## 2018-09-29 RX ADMIN — QUETIAPINE FUMARATE 100 MILLIGRAM(S): 200 TABLET, FILM COATED ORAL at 07:03

## 2018-09-29 RX ADMIN — BENZOCAINE AND MENTHOL 1 LOZENGE: 5; 1 LIQUID ORAL at 02:15

## 2018-09-29 RX ADMIN — QUETIAPINE FUMARATE 100 MILLIGRAM(S): 200 TABLET, FILM COATED ORAL at 20:47

## 2018-09-29 RX ADMIN — BENZOCAINE AND MENTHOL 1 LOZENGE: 5; 1 LIQUID ORAL at 21:29

## 2018-09-29 RX ADMIN — Medication 3 MILLIGRAM(S): at 20:47

## 2018-09-29 RX ADMIN — Medication 1 APPLICATION(S): at 21:10

## 2018-09-29 RX ADMIN — Medication 650 MILLIGRAM(S): at 06:45

## 2018-09-29 RX ADMIN — Medication 1 MILLIGRAM(S): at 07:03

## 2018-09-29 RX ADMIN — Medication 650 MILLIGRAM(S): at 05:34

## 2018-09-30 LAB
ALBUMIN SERPL ELPH-MCNC: 3.5 G/DL — SIGNIFICANT CHANGE UP (ref 3.3–5)
ALP SERPL-CCNC: 382 U/L — HIGH (ref 40–120)
ALT FLD-CCNC: 1255 U/L — HIGH (ref 4–33)
AST SERPL-CCNC: 288 U/L — HIGH (ref 4–32)
BASOPHILS # BLD AUTO: 0.03 K/UL — SIGNIFICANT CHANGE UP (ref 0–0.2)
BASOPHILS NFR BLD AUTO: 0.4 % — SIGNIFICANT CHANGE UP (ref 0–2)
BILIRUB SERPL-MCNC: 0.9 MG/DL — SIGNIFICANT CHANGE UP (ref 0.2–1.2)
BUN SERPL-MCNC: 16 MG/DL — SIGNIFICANT CHANGE UP (ref 7–23)
CALCIUM SERPL-MCNC: 8.8 MG/DL — SIGNIFICANT CHANGE UP (ref 8.4–10.5)
CHLORIDE SERPL-SCNC: 97 MMOL/L — LOW (ref 98–107)
CO2 SERPL-SCNC: 24 MMOL/L — SIGNIFICANT CHANGE UP (ref 22–31)
CREAT SERPL-MCNC: 0.78 MG/DL — SIGNIFICANT CHANGE UP (ref 0.5–1.3)
EOSINOPHIL # BLD AUTO: 0.05 K/UL — SIGNIFICANT CHANGE UP (ref 0–0.5)
EOSINOPHIL NFR BLD AUTO: 0.7 % — SIGNIFICANT CHANGE UP (ref 0–6)
GLUCOSE BLDC GLUCOMTR-MCNC: 104 MG/DL — HIGH (ref 70–99)
GLUCOSE SERPL-MCNC: 92 MG/DL — SIGNIFICANT CHANGE UP (ref 70–99)
HCT VFR BLD CALC: 30.1 % — LOW (ref 34.5–45)
HGB BLD-MCNC: 10 G/DL — LOW (ref 11.5–15.5)
IMM GRANULOCYTES # BLD AUTO: 0.04 # — SIGNIFICANT CHANGE UP
IMM GRANULOCYTES NFR BLD AUTO: 0.5 % — SIGNIFICANT CHANGE UP (ref 0–1.5)
LYMPHOCYTES # BLD AUTO: 0.6 K/UL — LOW (ref 1–3.3)
LYMPHOCYTES # BLD AUTO: 8.1 % — LOW (ref 13–44)
MCHC RBC-ENTMCNC: 29.5 PG — SIGNIFICANT CHANGE UP (ref 27–34)
MCHC RBC-ENTMCNC: 33.2 % — SIGNIFICANT CHANGE UP (ref 32–36)
MCV RBC AUTO: 88.8 FL — SIGNIFICANT CHANGE UP (ref 80–100)
MONOCYTES # BLD AUTO: 0.82 K/UL — SIGNIFICANT CHANGE UP (ref 0–0.9)
MONOCYTES NFR BLD AUTO: 11.1 % — SIGNIFICANT CHANGE UP (ref 2–14)
NEUTROPHILS # BLD AUTO: 5.85 K/UL — SIGNIFICANT CHANGE UP (ref 1.8–7.4)
NEUTROPHILS NFR BLD AUTO: 79.2 % — HIGH (ref 43–77)
NRBC # FLD: 0 — SIGNIFICANT CHANGE UP
PLATELET # BLD AUTO: 141 K/UL — LOW (ref 150–400)
PMV BLD: 9.5 FL — SIGNIFICANT CHANGE UP (ref 7–13)
POTASSIUM SERPL-MCNC: 4 MMOL/L — SIGNIFICANT CHANGE UP (ref 3.5–5.3)
POTASSIUM SERPL-SCNC: 4 MMOL/L — SIGNIFICANT CHANGE UP (ref 3.5–5.3)
PROT SERPL-MCNC: 6.3 G/DL — SIGNIFICANT CHANGE UP (ref 6–8.3)
RBC # BLD: 3.39 M/UL — LOW (ref 3.8–5.2)
RBC # FLD: 12.6 % — SIGNIFICANT CHANGE UP (ref 10.3–14.5)
SODIUM SERPL-SCNC: 131 MMOL/L — LOW (ref 135–145)
WBC # BLD: 7.39 K/UL — SIGNIFICANT CHANGE UP (ref 3.8–10.5)
WBC # FLD AUTO: 7.39 K/UL — SIGNIFICANT CHANGE UP (ref 3.8–10.5)

## 2018-09-30 RX ORDER — IBUPROFEN 200 MG
600 TABLET ORAL ONCE
Qty: 0 | Refills: 0 | Status: COMPLETED | OUTPATIENT
Start: 2018-09-30 | End: 2018-09-30

## 2018-09-30 RX ORDER — ACETAMINOPHEN 500 MG
650 TABLET ORAL ONCE
Qty: 0 | Refills: 0 | Status: DISCONTINUED | OUTPATIENT
Start: 2018-09-30 | End: 2018-09-30

## 2018-09-30 RX ADMIN — SERTRALINE 150 MILLIGRAM(S): 25 TABLET, FILM COATED ORAL at 06:27

## 2018-09-30 RX ADMIN — Medication 50 MILLIGRAM(S): at 20:40

## 2018-09-30 RX ADMIN — Medication 1 APPLICATION(S): at 12:10

## 2018-09-30 RX ADMIN — Medication 1 MILLIGRAM(S): at 13:37

## 2018-09-30 RX ADMIN — Medication 100 MILLIGRAM(S): at 06:27

## 2018-09-30 RX ADMIN — Medication 600 MILLIGRAM(S): at 22:08

## 2018-09-30 RX ADMIN — Medication 100 MILLIGRAM(S): at 01:08

## 2018-09-30 RX ADMIN — Medication 650 MILLIGRAM(S): at 08:38

## 2018-09-30 RX ADMIN — BENZOCAINE AND MENTHOL 1 LOZENGE: 5; 1 LIQUID ORAL at 19:55

## 2018-09-30 RX ADMIN — TUBERCULIN PURIFIED PROTEIN DERIVATIVE 5 UNIT(S): 5 INJECTION, SOLUTION INTRADERMAL at 17:51

## 2018-09-30 RX ADMIN — Medication 650 MILLIGRAM(S): at 00:53

## 2018-09-30 RX ADMIN — Medication 650 MILLIGRAM(S): at 08:25

## 2018-09-30 RX ADMIN — Medication 1 MILLIGRAM(S): at 20:40

## 2018-09-30 RX ADMIN — QUETIAPINE FUMARATE 100 MILLIGRAM(S): 200 TABLET, FILM COATED ORAL at 06:27

## 2018-09-30 RX ADMIN — QUETIAPINE FUMARATE 100 MILLIGRAM(S): 200 TABLET, FILM COATED ORAL at 13:37

## 2018-09-30 RX ADMIN — Medication 650 MILLIGRAM(S): at 02:00

## 2018-09-30 RX ADMIN — Medication 1 APPLICATION(S): at 21:03

## 2018-09-30 RX ADMIN — Medication 3 MILLIGRAM(S): at 20:40

## 2018-09-30 RX ADMIN — Medication 600 MILLIGRAM(S): at 20:40

## 2018-09-30 RX ADMIN — LETROZOLE 2.5 MILLIGRAM(S): 2.5 TABLET, FILM COATED ORAL at 08:27

## 2018-09-30 RX ADMIN — Medication 100 MILLIGRAM(S): at 11:40

## 2018-09-30 RX ADMIN — BENZOCAINE AND MENTHOL 1 LOZENGE: 5; 1 LIQUID ORAL at 03:15

## 2018-09-30 RX ADMIN — Medication 1 MILLIGRAM(S): at 06:27

## 2018-09-30 RX ADMIN — QUETIAPINE FUMARATE 100 MILLIGRAM(S): 200 TABLET, FILM COATED ORAL at 20:40

## 2018-10-01 LAB
ALBUMIN SERPL ELPH-MCNC: 3.5 G/DL — SIGNIFICANT CHANGE UP (ref 3.3–5)
ALP SERPL-CCNC: 368 U/L — HIGH (ref 40–120)
ALT FLD-CCNC: 850 U/L — HIGH (ref 4–33)
APTT BLD: 52.9 SEC — HIGH (ref 27.5–37.4)
AST SERPL-CCNC: 114 U/L — HIGH (ref 4–32)
BILIRUB SERPL-MCNC: 0.5 MG/DL — SIGNIFICANT CHANGE UP (ref 0.2–1.2)
BUN SERPL-MCNC: 23 MG/DL — SIGNIFICANT CHANGE UP (ref 7–23)
CALCIUM SERPL-MCNC: 8.8 MG/DL — SIGNIFICANT CHANGE UP (ref 8.4–10.5)
CHLORIDE SERPL-SCNC: 97 MMOL/L — LOW (ref 98–107)
CO2 SERPL-SCNC: 25 MMOL/L — SIGNIFICANT CHANGE UP (ref 22–31)
CREAT SERPL-MCNC: 1.5 MG/DL — HIGH (ref 0.5–1.3)
GLUCOSE SERPL-MCNC: 100 MG/DL — HIGH (ref 70–99)
INR BLD: 1.07 — SIGNIFICANT CHANGE UP (ref 0.88–1.17)
POTASSIUM SERPL-MCNC: 5 MMOL/L — SIGNIFICANT CHANGE UP (ref 3.5–5.3)
POTASSIUM SERPL-SCNC: 5 MMOL/L — SIGNIFICANT CHANGE UP (ref 3.5–5.3)
PROT SERPL-MCNC: 6.8 G/DL — SIGNIFICANT CHANGE UP (ref 6–8.3)
PROTHROM AB SERPL-ACNC: 12.3 SEC — SIGNIFICANT CHANGE UP (ref 9.8–13.1)
SODIUM SERPL-SCNC: 134 MMOL/L — LOW (ref 135–145)

## 2018-10-01 PROCEDURE — 99232 SBSQ HOSP IP/OBS MODERATE 35: CPT

## 2018-10-01 PROCEDURE — 99233 SBSQ HOSP IP/OBS HIGH 50: CPT

## 2018-10-01 RX ADMIN — Medication 3 MILLIGRAM(S): at 21:27

## 2018-10-01 RX ADMIN — Medication 1 APPLICATION(S): at 14:09

## 2018-10-01 RX ADMIN — Medication 100 MILLIGRAM(S): at 01:40

## 2018-10-01 RX ADMIN — LETROZOLE 2.5 MILLIGRAM(S): 2.5 TABLET, FILM COATED ORAL at 14:12

## 2018-10-01 RX ADMIN — QUETIAPINE FUMARATE 100 MILLIGRAM(S): 200 TABLET, FILM COATED ORAL at 14:12

## 2018-10-01 RX ADMIN — Medication 1 APPLICATION(S): at 21:28

## 2018-10-01 RX ADMIN — SERTRALINE 150 MILLIGRAM(S): 25 TABLET, FILM COATED ORAL at 06:42

## 2018-10-01 RX ADMIN — Medication 1 MILLIGRAM(S): at 14:12

## 2018-10-01 RX ADMIN — BENZOCAINE AND MENTHOL 1 LOZENGE: 5; 1 LIQUID ORAL at 01:40

## 2018-10-01 RX ADMIN — Medication 1 MILLIGRAM(S): at 06:42

## 2018-10-01 RX ADMIN — Medication 50 MILLIGRAM(S): at 21:27

## 2018-10-01 RX ADMIN — Medication 1 MILLIGRAM(S): at 21:27

## 2018-10-01 RX ADMIN — QUETIAPINE FUMARATE 100 MILLIGRAM(S): 200 TABLET, FILM COATED ORAL at 21:27

## 2018-10-01 RX ADMIN — QUETIAPINE FUMARATE 100 MILLIGRAM(S): 200 TABLET, FILM COATED ORAL at 06:42

## 2018-10-01 NOTE — PROGRESS NOTE ADULT - SUBJECTIVE AND OBJECTIVE BOX
Reason for consult: Elevated LFTs     Patient is a 78y old  Female who presents with a chief complaint of     SUBJECTIVE / OVERNIGHT EVENTS: Patient says she has been feeling a little congested with sore throat over the past 2-3 days with mild cough. Otherwise she denies fevers, chills, myalgias, SOB, rash, abdominal pain or diarrhea.      Review of Systems:     MEDICATIONS  (STANDING):  benzocaine 10% Gel 1 Application(s) Mucosal two times a day  letrozole 2.5 milliGRAM(s) Oral daily  LORazepam     Tablet 1 milliGRAM(s) Oral <User Schedule>  melatonin. 3 milliGRAM(s) Oral at bedtime  QUEtiapine 100 milliGRAM(s) Oral <User Schedule>  QUEtiapine 100 milliGRAM(s) Oral at bedtime  sertraline 150 milliGRAM(s) Oral <User Schedule>  traZODone 50 milliGRAM(s) Oral at bedtime    MEDICATIONS  (PRN):  aluminum hydroxide/magnesium hydroxide/simethicone Suspension 30 milliLiter(s) Oral every 6 hours PRN Dyspepsia  benzocaine 15 mG/menthol 3.6 mG Lozenge 1 Lozenge Oral every 4 hours PRN Sore Throat  docusate sodium 100 milliGRAM(s) Oral three times a day PRN Constipation  guaiFENesin    Syrup 100 milliGRAM(s) Oral every 6 hours PRN Cough  hemorrhoidal Ointment 1 Application(s) Rectal every 6 hours PRN pain  hydrOXYzine hydrochloride 25 milliGRAM(s) Oral every 6 hours PRN Anxiety  polyethylene glycol 3350 17 Gram(s) Oral daily PRN Constipation      PHYSICAL EXAM:  Vital Signs Last 24 Hrs  T(C): 36.4 (01 Oct 2018 06:35), Max: 37.9 (30 Sep 2018 19:53)  T(F): 97.6 (01 Oct 2018 06:35), Max: 100.2 (30 Sep 2018 19:53)  HR: --91 (01 Oct 2018 06:35)  BP: --129/65 (01 Oct 2018 06:35)  BP(mean): --  RR: 15 (30 Sep 2018 19:53) (15 - 15)  SpO2: --  I&O's Summary    GENERAL: NAD,   HEAD:  Atraumatic, Normocephalic  EYES: EOMI, PERRLA, L eye w/ mild erythema without purulent drainage or periorbital swelling or TTP  NECK: Supple, No JVD  CHEST/LUNG: Clear to auscultation bilaterally; No wheeze  HEART: Regular rate and rhythm; No murmurs, rubs, or gallops  ABDOMEN: Soft, Nontender, Nondistended; Bowel sounds present  EXTREMITIES:  2+ Peripheral Pulses, No clubbing, cyanosis, or edema  PSYCH: calm, follows commands   NEUROLOGY: non-focal  SKIN: No rashes or lesions    LABS:  CAPILLARY BLOOD GLUCOSE                              10.0   7.39  )-----------( 141      ( 30 Sep 2018 09:50 )             30.1     09-30    131<L>  |  97<L>  |  16  ----------------------------<  92  4.0   |  24  |  0.78    Ca    8.8      30 Sep 2018 09:50    TPro  6.3  /  Alb  3.5  /  TBili  0.9  /  DBili  x   /  AST  288<H>  /  ALT  1255<H>  /  AlkPhos  382<H>  09-30              RADIOLOGY & ADDITIONAL TESTS:    Imaging Personally Reviewed:    Consultant(s) Notes Reviewed:      Care Discussed with Consultants/Other Providers:

## 2018-10-01 NOTE — PROGRESS NOTE ADULT - ASSESSMENT
79 y/o F with PMH of breast cancer admitted to [facility] with a primary psychiatric diagnosis of Schizoaffective disorder. Called to evaluate for transaminitis noted on 9/30 CMP      Transaminitis - Pt currently asymptomatic. However, concerning that 9/21 LFTs relatively normal and now w/ ALT > 1000, w/ hepatocellular pattern injury    Hyponatremia - Noted on 9/30 labs to 131. Mild . Asymptomatic   -Continue to monitor for now. If worsening on todays labs then will consider further workup     Breast ca - in remission. C/w Letrozole    Schizoaffective d/o - management per psych 79 y/o F with PMH of breast cancer admitted to [facility] with a primary psychiatric diagnosis of Schizoaffective disorder. Called to evaluate for transaminitis noted on 9/30 CMP      Transaminitis - Pt currently asymptomatic. However, concerning that 9/21 LFTs relatively normal and now w/ ALT > 1000, w/ hepatocellular pattern injury  -f/u repeat CMP and coags STAT today  -pt is on seroquel and setraline both reported w/ SE of transaminitis but unclear if this is the culprit, especially since these are not new meds   -Broad ddx at this time including viral/infectious vs medication induced vs hemodynamic mediated vs autoimmune - Pt w/ noted hypotension on 9/29 however BPs have been stable recently. Plan to be determined based on repeat labs bc depending on degree of liver injury may need to be transferred to [facility] for further w/u. Please call hospitalist when labs return.   -Will continue to follow with you    Hyponatremia - Noted on 9/30 labs to 131. Mild . Asymptomatic   -Continue to monitor for now. If worsening on todays labs then will consider further workup     Breast ca - in remission. C/w Letrozole    Schizoaffective d/o - management per psych 77 y/o F with PMH of breast cancer admitted to [facility] with a primary psychiatric diagnosis of Schizoaffective disorder. Called to evaluate for transaminitis noted on 9/30 CMP      Transaminitis - Pt currently asymptomatic. However, concerning that 9/21 LFTs relatively normal and now w/ ALT > 1000, w/ hepatocellular pattern injury  -f/u repeat CMP and coags STAT today  -Pt was on keflex however last dose was 8/25. Pt is on seroquel and setraline both reported w/ SE of transaminitis but unclear if this is the culprit, especially since these are not new meds   -Broad ddx at this time including viral/infectious vs medication induced vs hemodynamic mediated vs autoimmune - Pt w/ noted hypotension on 9/29 however BPs have been stable recently. Plan to be determined based on repeat labs bc depending on degree of liver injury may need to be transferred to [facility] for further w/u. Please call hospitalist when labs return.   -Will continue to follow with you    Hyponatremia - Noted on 9/30 labs to 131. Mild . Asymptomatic   -Continue to monitor for now. If worsening on todays labs then will consider further workup     Breast ca - in remission. C/w Letrozole    Schizoaffective d/o - management per psych

## 2018-10-02 LAB
ALBUMIN SERPL ELPH-MCNC: 3.5 G/DL — SIGNIFICANT CHANGE UP (ref 3.3–5)
ALP SERPL-CCNC: 345 U/L — HIGH (ref 40–120)
ALT FLD-CCNC: 638 U/L — HIGH (ref 4–33)
APTT BLD: 57.5 SEC — HIGH (ref 27.5–37.4)
AST SERPL-CCNC: 69 U/L — HIGH (ref 4–32)
B PERT DNA SPEC QL NAA+PROBE: SIGNIFICANT CHANGE UP
BASOPHILS # BLD AUTO: 0.03 K/UL — SIGNIFICANT CHANGE UP (ref 0–0.2)
BASOPHILS NFR BLD AUTO: 0.4 % — SIGNIFICANT CHANGE UP (ref 0–2)
BILIRUB DIRECT SERPL-MCNC: 0.2 MG/DL — SIGNIFICANT CHANGE UP (ref 0.1–0.2)
BILIRUB SERPL-MCNC: 0.6 MG/DL — SIGNIFICANT CHANGE UP (ref 0.2–1.2)
BUN SERPL-MCNC: 25 MG/DL — HIGH (ref 7–23)
C PNEUM DNA SPEC QL NAA+PROBE: NOT DETECTED — SIGNIFICANT CHANGE UP
CALCIUM SERPL-MCNC: 8.8 MG/DL — SIGNIFICANT CHANGE UP (ref 8.4–10.5)
CHLORIDE SERPL-SCNC: 97 MMOL/L — LOW (ref 98–107)
CO2 SERPL-SCNC: 21 MMOL/L — LOW (ref 22–31)
CREAT SERPL-MCNC: 0.9 MG/DL — SIGNIFICANT CHANGE UP (ref 0.5–1.3)
EOSINOPHIL # BLD AUTO: 0.09 K/UL — SIGNIFICANT CHANGE UP (ref 0–0.5)
EOSINOPHIL NFR BLD AUTO: 1.2 % — SIGNIFICANT CHANGE UP (ref 0–6)
FLUAV H1 2009 PAND RNA SPEC QL NAA+PROBE: NOT DETECTED — SIGNIFICANT CHANGE UP
FLUAV H1 RNA SPEC QL NAA+PROBE: NOT DETECTED — SIGNIFICANT CHANGE UP
FLUAV H3 RNA SPEC QL NAA+PROBE: NOT DETECTED — SIGNIFICANT CHANGE UP
FLUAV SUBTYP SPEC NAA+PROBE: SIGNIFICANT CHANGE UP
FLUBV RNA SPEC QL NAA+PROBE: NOT DETECTED — SIGNIFICANT CHANGE UP
GLUCOSE SERPL-MCNC: 103 MG/DL — HIGH (ref 70–99)
HADV DNA SPEC QL NAA+PROBE: NOT DETECTED — SIGNIFICANT CHANGE UP
HCOV 229E RNA SPEC QL NAA+PROBE: NOT DETECTED — SIGNIFICANT CHANGE UP
HCOV HKU1 RNA SPEC QL NAA+PROBE: NOT DETECTED — SIGNIFICANT CHANGE UP
HCOV NL63 RNA SPEC QL NAA+PROBE: NOT DETECTED — SIGNIFICANT CHANGE UP
HCOV OC43 RNA SPEC QL NAA+PROBE: NOT DETECTED — SIGNIFICANT CHANGE UP
HCT VFR BLD CALC: 29.7 % — LOW (ref 34.5–45)
HGB BLD-MCNC: 9.9 G/DL — LOW (ref 11.5–15.5)
HIV 1+2 AB+HIV1 P24 AG SERPL QL IA: SIGNIFICANT CHANGE UP
HMPV RNA SPEC QL NAA+PROBE: NOT DETECTED — SIGNIFICANT CHANGE UP
HPIV1 RNA SPEC QL NAA+PROBE: NOT DETECTED — SIGNIFICANT CHANGE UP
HPIV2 RNA SPEC QL NAA+PROBE: NOT DETECTED — SIGNIFICANT CHANGE UP
HPIV3 RNA SPEC QL NAA+PROBE: NOT DETECTED — SIGNIFICANT CHANGE UP
HPIV4 RNA SPEC QL NAA+PROBE: NOT DETECTED — SIGNIFICANT CHANGE UP
IMM GRANULOCYTES # BLD AUTO: 0.11 # — SIGNIFICANT CHANGE UP
IMM GRANULOCYTES NFR BLD AUTO: 1.5 % — SIGNIFICANT CHANGE UP (ref 0–1.5)
INR BLD: 1.15 — SIGNIFICANT CHANGE UP (ref 0.88–1.17)
LYMPHOCYTES # BLD AUTO: 0.86 K/UL — LOW (ref 1–3.3)
LYMPHOCYTES # BLD AUTO: 11.7 % — LOW (ref 13–44)
M PNEUMO DNA SPEC QL NAA+PROBE: NOT DETECTED — SIGNIFICANT CHANGE UP
MCHC RBC-ENTMCNC: 29 PG — SIGNIFICANT CHANGE UP (ref 27–34)
MCHC RBC-ENTMCNC: 33.3 % — SIGNIFICANT CHANGE UP (ref 32–36)
MCV RBC AUTO: 87.1 FL — SIGNIFICANT CHANGE UP (ref 80–100)
MONOCYTES # BLD AUTO: 0.84 K/UL — SIGNIFICANT CHANGE UP (ref 0–0.9)
MONOCYTES NFR BLD AUTO: 11.4 % — SIGNIFICANT CHANGE UP (ref 2–14)
NEUTROPHILS # BLD AUTO: 5.45 K/UL — SIGNIFICANT CHANGE UP (ref 1.8–7.4)
NEUTROPHILS NFR BLD AUTO: 73.8 % — SIGNIFICANT CHANGE UP (ref 43–77)
NRBC # FLD: 0 — SIGNIFICANT CHANGE UP
PLATELET # BLD AUTO: 178 K/UL — SIGNIFICANT CHANGE UP (ref 150–400)
PMV BLD: 9.2 FL — SIGNIFICANT CHANGE UP (ref 7–13)
POTASSIUM SERPL-MCNC: 4.1 MMOL/L — SIGNIFICANT CHANGE UP (ref 3.5–5.3)
POTASSIUM SERPL-SCNC: 4.1 MMOL/L — SIGNIFICANT CHANGE UP (ref 3.5–5.3)
PROT SERPL-MCNC: 7 G/DL — SIGNIFICANT CHANGE UP (ref 6–8.3)
PROTHROM AB SERPL-ACNC: 13.3 SEC — HIGH (ref 9.8–13.1)
RBC # BLD: 3.41 M/UL — LOW (ref 3.8–5.2)
RBC # FLD: 12.6 % — SIGNIFICANT CHANGE UP (ref 10.3–14.5)
RSV RNA SPEC QL NAA+PROBE: NOT DETECTED — SIGNIFICANT CHANGE UP
RV+EV RNA SPEC QL NAA+PROBE: POSITIVE — HIGH
SODIUM SERPL-SCNC: 133 MMOL/L — LOW (ref 135–145)
WBC # BLD: 7.38 K/UL — SIGNIFICANT CHANGE UP (ref 3.8–10.5)
WBC # FLD AUTO: 7.38 K/UL — SIGNIFICANT CHANGE UP (ref 3.8–10.5)

## 2018-10-02 PROCEDURE — 99232 SBSQ HOSP IP/OBS MODERATE 35: CPT

## 2018-10-02 RX ORDER — QUETIAPINE FUMARATE 200 MG/1
1 TABLET, FILM COATED ORAL
Qty: 0 | Refills: 0 | DISCHARGE
Start: 2018-10-02

## 2018-10-02 RX ORDER — TRAZODONE HCL 50 MG
1 TABLET ORAL
Qty: 0 | Refills: 0 | DISCHARGE
Start: 2018-10-02

## 2018-10-02 RX ORDER — TRAZODONE HCL 50 MG
0 TABLET ORAL
Qty: 0 | Refills: 0 | COMMUNITY

## 2018-10-02 RX ORDER — SERTRALINE 25 MG/1
3 TABLET, FILM COATED ORAL
Qty: 0 | Refills: 0 | COMMUNITY
Start: 2018-10-02

## 2018-10-02 RX ORDER — OLANZAPINE 15 MG/1
1 TABLET, FILM COATED ORAL
Qty: 0 | Refills: 0 | COMMUNITY

## 2018-10-02 RX ORDER — SERTRALINE 25 MG/1
1 TABLET, FILM COATED ORAL
Qty: 0 | Refills: 0 | COMMUNITY

## 2018-10-02 RX ORDER — QUETIAPINE FUMARATE 200 MG/1
1 TABLET, FILM COATED ORAL
Qty: 0 | Refills: 0 | COMMUNITY
Start: 2018-10-02

## 2018-10-02 RX ORDER — LETROZOLE 2.5 MG/1
1 TABLET, FILM COATED ORAL
Qty: 0 | Refills: 0 | DISCHARGE
Start: 2018-10-02

## 2018-10-02 RX ORDER — SERTRALINE 25 MG/1
150 TABLET, FILM COATED ORAL
Qty: 0 | Refills: 0 | DISCHARGE
Start: 2018-10-02

## 2018-10-02 RX ORDER — LANOLIN ALCOHOL/MO/W.PET/CERES
1 CREAM (GRAM) TOPICAL
Qty: 0 | Refills: 0 | DISCHARGE
Start: 2018-10-02

## 2018-10-02 RX ORDER — LETROZOLE 2.5 MG/1
1 TABLET, FILM COATED ORAL
Qty: 0 | Refills: 0 | COMMUNITY

## 2018-10-02 RX ORDER — OXYBUTYNIN CHLORIDE 5 MG
0 TABLET ORAL
Qty: 0 | Refills: 0 | COMMUNITY

## 2018-10-02 RX ORDER — IBUPROFEN 200 MG
400 TABLET ORAL ONCE
Qty: 0 | Refills: 0 | Status: COMPLETED | OUTPATIENT
Start: 2018-10-02 | End: 2018-10-02

## 2018-10-02 RX ADMIN — Medication 1 MILLIGRAM(S): at 06:31

## 2018-10-02 RX ADMIN — QUETIAPINE FUMARATE 100 MILLIGRAM(S): 200 TABLET, FILM COATED ORAL at 14:40

## 2018-10-02 RX ADMIN — Medication 1 APPLICATION(S): at 08:46

## 2018-10-02 RX ADMIN — Medication 400 MILLIGRAM(S): at 03:51

## 2018-10-02 RX ADMIN — QUETIAPINE FUMARATE 100 MILLIGRAM(S): 200 TABLET, FILM COATED ORAL at 20:32

## 2018-10-02 RX ADMIN — Medication 1 MILLIGRAM(S): at 14:40

## 2018-10-02 RX ADMIN — Medication 1 MILLIGRAM(S): at 20:32

## 2018-10-02 RX ADMIN — Medication 400 MILLIGRAM(S): at 03:32

## 2018-10-02 RX ADMIN — SERTRALINE 150 MILLIGRAM(S): 25 TABLET, FILM COATED ORAL at 06:30

## 2018-10-02 RX ADMIN — Medication 50 MILLIGRAM(S): at 20:32

## 2018-10-02 RX ADMIN — LETROZOLE 2.5 MILLIGRAM(S): 2.5 TABLET, FILM COATED ORAL at 08:46

## 2018-10-02 RX ADMIN — Medication 3 MILLIGRAM(S): at 20:32

## 2018-10-02 RX ADMIN — QUETIAPINE FUMARATE 100 MILLIGRAM(S): 200 TABLET, FILM COATED ORAL at 06:30

## 2018-10-02 NOTE — CHART NOTE - NSCHARTNOTEFT_GEN_A_CORE
Patient seen and examined yesterday for transaminitis. Repeat CMP from 10/1 reviewed and pending today's levels.      10/1/18 labs notable for improving transaminitis w/ increased PTT (normal INR). However,  pt also with new AGUSTIN.     Follow up plan:  1) Transaminitis - Transaminitis c/w  hepatocellular pattern injury. Broad ddx at this time including viral/infectious (given pts symptoms of viral URI) vs medication induced vs hemodynamic mediated vs autoimmune. Pt w/ noted hypotension on 9/29 however BPs have been stable recently. Medications that are possible etiologies: Pt was on keflex however last dose was 8/25. Pt is on seroquel and setraline both reported w/ SE of transaminitis but unclear if this is the culprit, especially since these are not new med.   -Will continue to trend CMP and coags for now  -Will also obtain CBC, EBV, CMV, hepatitis panel, and HIV  -Please obtain RUQ abdominal U/S to assess liver     2)Viral URI- Pt reports rhinorrhea, cough, and sore throat yesterday.   -Supportive care  -Encourage PO hydration  -obtain RVP. Viral etiology     3)AGUSTIN- suspect in the setting of decreased PO and viral illness.   -f/u CMP for today  -encourage oral hydration as much as possible   -monitor urine output   -if not improving w/ increased oral intake, will need further work up    Hyponatremia - Mild. Improving since yesterday.  Asymptomatic .  -Continue to monitor for now. If worsening then will consider further workup

## 2018-10-03 ENCOUNTER — INPATIENT (INPATIENT)
Facility: HOSPITAL | Age: 78
LOS: 2 days | Discharge: NURSING, REHAB OR LONG-TERM CARE | End: 2018-10-06
Attending: HOSPITALIST | Admitting: HOSPITALIST
Payer: MEDICARE

## 2018-10-03 VITALS — RESPIRATION RATE: 18 BRPM | TEMPERATURE: 98 F

## 2018-10-03 VITALS
SYSTOLIC BLOOD PRESSURE: 135 MMHG | OXYGEN SATURATION: 98 % | RESPIRATION RATE: 18 BRPM | HEART RATE: 113 BPM | DIASTOLIC BLOOD PRESSURE: 69 MMHG | TEMPERATURE: 101 F

## 2018-10-03 LAB
ALBUMIN SERPL ELPH-MCNC: 3.7 G/DL — SIGNIFICANT CHANGE UP (ref 3.3–5)
ALP SERPL-CCNC: 299 U/L — HIGH (ref 40–120)
ALT FLD-CCNC: 424 U/L — HIGH (ref 4–33)
APPEARANCE UR: CLEAR — SIGNIFICANT CHANGE UP
APTT BLD: 54.1 SEC — HIGH (ref 27.5–37.4)
AST SERPL-CCNC: 40 U/L — HIGH (ref 4–32)
BACTERIA # UR AUTO: NEGATIVE — SIGNIFICANT CHANGE UP
BASE EXCESS BLDV CALC-SCNC: 2.2 MMOL/L — SIGNIFICANT CHANGE UP
BASOPHILS # BLD AUTO: 0.03 K/UL — SIGNIFICANT CHANGE UP (ref 0–0.2)
BASOPHILS NFR BLD AUTO: 0.4 % — SIGNIFICANT CHANGE UP (ref 0–2)
BILIRUB SERPL-MCNC: 0.4 MG/DL — SIGNIFICANT CHANGE UP (ref 0.2–1.2)
BILIRUB UR-MCNC: NEGATIVE — SIGNIFICANT CHANGE UP
BLOOD GAS VENOUS - CREATININE: 0.7 MG/DL — SIGNIFICANT CHANGE UP (ref 0.5–1.3)
BLOOD UR QL VISUAL: NEGATIVE — SIGNIFICANT CHANGE UP
BUN SERPL-MCNC: 27 MG/DL — HIGH (ref 7–23)
CALCIUM SERPL-MCNC: 9.1 MG/DL — SIGNIFICANT CHANGE UP (ref 8.4–10.5)
CHLORIDE BLDV-SCNC: 103 MMOL/L — SIGNIFICANT CHANGE UP (ref 96–108)
CHLORIDE SERPL-SCNC: 98 MMOL/L — SIGNIFICANT CHANGE UP (ref 98–107)
CO2 SERPL-SCNC: 24 MMOL/L — SIGNIFICANT CHANGE UP (ref 22–31)
COLOR SPEC: SIGNIFICANT CHANGE UP
CREAT SERPL-MCNC: 0.88 MG/DL — SIGNIFICANT CHANGE UP (ref 0.5–1.3)
EBV EA AB TITR SER IF: NEGATIVE — SIGNIFICANT CHANGE UP
EBV EA IGG SER-ACNC: POSITIVE — SIGNIFICANT CHANGE UP
EBV PATRN SPEC IB-IMP: SIGNIFICANT CHANGE UP
EBV VCA IGG AVIDITY SER QL IA: POSITIVE — SIGNIFICANT CHANGE UP
EBV VCA IGM TITR FLD: POSITIVE — SIGNIFICANT CHANGE UP
EOSINOPHIL # BLD AUTO: 0.04 K/UL — SIGNIFICANT CHANGE UP (ref 0–0.5)
EOSINOPHIL NFR BLD AUTO: 0.5 % — SIGNIFICANT CHANGE UP (ref 0–6)
GAS PNL BLDV: 134 MMOL/L — LOW (ref 136–146)
GLUCOSE BLDV-MCNC: 122 — HIGH (ref 70–99)
GLUCOSE SERPL-MCNC: 127 MG/DL — HIGH (ref 70–99)
GLUCOSE UR-MCNC: NEGATIVE — SIGNIFICANT CHANGE UP
HCO3 BLDV-SCNC: 26 MMOL/L — SIGNIFICANT CHANGE UP (ref 20–27)
HCT VFR BLD CALC: 29.7 % — LOW (ref 34.5–45)
HCT VFR BLDV CALC: 32.2 % — LOW (ref 34.5–45)
HGB BLD-MCNC: 9.8 G/DL — LOW (ref 11.5–15.5)
HGB BLDV-MCNC: 10.4 G/DL — LOW (ref 11.5–15.5)
HYALINE CASTS # UR AUTO: SIGNIFICANT CHANGE UP
IMM GRANULOCYTES # BLD AUTO: 0.18 # — SIGNIFICANT CHANGE UP
IMM GRANULOCYTES NFR BLD AUTO: 2.3 % — HIGH (ref 0–1.5)
INR BLD: 1.18 — HIGH (ref 0.88–1.17)
KETONES UR-MCNC: NEGATIVE — SIGNIFICANT CHANGE UP
LACTATE BLDV-MCNC: 1.8 MMOL/L — SIGNIFICANT CHANGE UP (ref 0.5–2)
LEUKOCYTE ESTERASE UR-ACNC: SIGNIFICANT CHANGE UP
LYMPHOCYTES # BLD AUTO: 0.76 K/UL — LOW (ref 1–3.3)
LYMPHOCYTES # BLD AUTO: 9.5 % — LOW (ref 13–44)
MCHC RBC-ENTMCNC: 29 PG — SIGNIFICANT CHANGE UP (ref 27–34)
MCHC RBC-ENTMCNC: 33 % — SIGNIFICANT CHANGE UP (ref 32–36)
MCV RBC AUTO: 87.9 FL — SIGNIFICANT CHANGE UP (ref 80–100)
MONOCYTES # BLD AUTO: 1.07 K/UL — HIGH (ref 0–0.9)
MONOCYTES NFR BLD AUTO: 13.4 % — SIGNIFICANT CHANGE UP (ref 2–14)
NEUTROPHILS # BLD AUTO: 5.91 K/UL — SIGNIFICANT CHANGE UP (ref 1.8–7.4)
NEUTROPHILS NFR BLD AUTO: 73.9 % — SIGNIFICANT CHANGE UP (ref 43–77)
NITRITE UR-MCNC: NEGATIVE — SIGNIFICANT CHANGE UP
NRBC # FLD: 0 — SIGNIFICANT CHANGE UP
PCO2 BLDV: 44 MMHG — SIGNIFICANT CHANGE UP (ref 41–51)
PH BLDV: 7.4 PH — SIGNIFICANT CHANGE UP (ref 7.32–7.43)
PH UR: 6.5 — SIGNIFICANT CHANGE UP (ref 5–8)
PLATELET # BLD AUTO: 228 K/UL — SIGNIFICANT CHANGE UP (ref 150–400)
PMV BLD: 9.1 FL — SIGNIFICANT CHANGE UP (ref 7–13)
PO2 BLDV: 45 MMHG — HIGH (ref 35–40)
POTASSIUM BLDV-SCNC: 4.1 MMOL/L — SIGNIFICANT CHANGE UP (ref 3.4–4.5)
POTASSIUM SERPL-MCNC: 4.2 MMOL/L — SIGNIFICANT CHANGE UP (ref 3.5–5.3)
POTASSIUM SERPL-SCNC: 4.2 MMOL/L — SIGNIFICANT CHANGE UP (ref 3.5–5.3)
PROT SERPL-MCNC: 7.2 G/DL — SIGNIFICANT CHANGE UP (ref 6–8.3)
PROT UR-MCNC: NEGATIVE — SIGNIFICANT CHANGE UP
PROTHROM AB SERPL-ACNC: 13.1 SEC — SIGNIFICANT CHANGE UP (ref 9.8–13.1)
RBC # BLD: 3.38 M/UL — LOW (ref 3.8–5.2)
RBC # FLD: 12.4 % — SIGNIFICANT CHANGE UP (ref 10.3–14.5)
RBC CASTS # UR COMP ASSIST: SIGNIFICANT CHANGE UP (ref 0–?)
SAO2 % BLDV: 77 % — SIGNIFICANT CHANGE UP (ref 60–85)
SODIUM SERPL-SCNC: 136 MMOL/L — SIGNIFICANT CHANGE UP (ref 135–145)
SP GR SPEC: 1.01 — SIGNIFICANT CHANGE UP (ref 1–1.04)
SQUAMOUS # UR AUTO: SIGNIFICANT CHANGE UP
UROBILINOGEN FLD QL: NORMAL — SIGNIFICANT CHANGE UP
WBC # BLD: 7.99 K/UL — SIGNIFICANT CHANGE UP (ref 3.8–10.5)
WBC # FLD AUTO: 7.99 K/UL — SIGNIFICANT CHANGE UP (ref 3.8–10.5)
WBC UR QL: SIGNIFICANT CHANGE UP (ref 0–?)

## 2018-10-03 PROCEDURE — 74177 CT ABD & PELVIS W/CONTRAST: CPT | Mod: 26

## 2018-10-03 PROCEDURE — 71046 X-RAY EXAM CHEST 2 VIEWS: CPT | Mod: 26

## 2018-10-03 PROCEDURE — 99238 HOSP IP/OBS DSCHRG MGMT 30/<: CPT

## 2018-10-03 RX ORDER — CIPROFLOXACIN LACTATE 400MG/40ML
400 VIAL (ML) INTRAVENOUS ONCE
Qty: 0 | Refills: 0 | Status: COMPLETED | OUTPATIENT
Start: 2018-10-03 | End: 2018-10-03

## 2018-10-03 RX ORDER — CEFEPIME 1 G/1
2000 INJECTION, POWDER, FOR SOLUTION INTRAMUSCULAR; INTRAVENOUS ONCE
Qty: 0 | Refills: 0 | Status: COMPLETED | OUTPATIENT
Start: 2018-10-03 | End: 2018-10-03

## 2018-10-03 RX ORDER — SODIUM CHLORIDE 9 MG/ML
1000 INJECTION, SOLUTION INTRAVENOUS
Qty: 0 | Refills: 0 | Status: DISCONTINUED | OUTPATIENT
Start: 2018-10-03 | End: 2018-10-04

## 2018-10-03 RX ORDER — VANCOMYCIN HCL 1 G
1000 VIAL (EA) INTRAVENOUS ONCE
Qty: 0 | Refills: 0 | Status: COMPLETED | OUTPATIENT
Start: 2018-10-03 | End: 2018-10-03

## 2018-10-03 RX ORDER — ACETAMINOPHEN 500 MG
975 TABLET ORAL ONCE
Qty: 0 | Refills: 0 | Status: COMPLETED | OUTPATIENT
Start: 2018-10-03 | End: 2018-10-03

## 2018-10-03 RX ADMIN — SODIUM CHLORIDE 1000 MILLILITER(S): 9 INJECTION, SOLUTION INTRAVENOUS at 20:16

## 2018-10-03 RX ADMIN — Medication 1 MILLIGRAM(S): at 19:28

## 2018-10-03 RX ADMIN — SODIUM CHLORIDE 1000 MILLILITER(S): 9 INJECTION, SOLUTION INTRAVENOUS at 19:16

## 2018-10-03 RX ADMIN — Medication 200 MILLIGRAM(S): at 19:16

## 2018-10-03 RX ADMIN — Medication 975 MILLIGRAM(S): at 20:16

## 2018-10-03 RX ADMIN — Medication 400 MILLIGRAM(S): at 20:16

## 2018-10-03 RX ADMIN — Medication 975 MILLIGRAM(S): at 19:16

## 2018-10-03 RX ADMIN — CEFEPIME 100 MILLIGRAM(S): 1 INJECTION, POWDER, FOR SOLUTION INTRAMUSCULAR; INTRAVENOUS at 19:16

## 2018-10-03 RX ADMIN — BENZOCAINE AND MENTHOL 1 LOZENGE: 5; 1 LIQUID ORAL at 03:27

## 2018-10-03 RX ADMIN — Medication 250 MILLIGRAM(S): at 23:15

## 2018-10-03 RX ADMIN — QUETIAPINE FUMARATE 100 MILLIGRAM(S): 200 TABLET, FILM COATED ORAL at 06:03

## 2018-10-03 RX ADMIN — SERTRALINE 150 MILLIGRAM(S): 25 TABLET, FILM COATED ORAL at 06:03

## 2018-10-03 RX ADMIN — LETROZOLE 2.5 MILLIGRAM(S): 2.5 TABLET, FILM COATED ORAL at 08:08

## 2018-10-03 RX ADMIN — Medication 100 MILLIGRAM(S): at 03:27

## 2018-10-03 NOTE — ED ADULT NURSE NOTE - CHIEF COMPLAINT QUOTE
pt d/c from [facility] earlier today, c/o feeling agitated and nervous. states she did not want to go back to assisted facility. pt febrile in triage. reports non productive cough. denies dysuria.

## 2018-10-03 NOTE — ED ADULT NURSE REASSESSMENT NOTE - NS ED NURSE REASSESS COMMENT FT1
repeat labs drawn, vitals as noted. pt appears comfortable, respirations equal and non labored, denies pain. IV antibiotics infusing per orders, pt enroute to CT.

## 2018-10-03 NOTE — ED PROVIDER NOTE - MEDICAL DECISION MAKING DETAILS
78 year old female with a PMHx of ?schizophrenia, anxiety, depression - recently d/c'ed from [facility] today (admitted for 1 month) for SI brought today in for agitation, anxiety and "feeling scared" once arriving at assisted living facility.   Plan: r/o PNA, UTI

## 2018-10-03 NOTE — ED ADULT NURSE REASSESSMENT NOTE - NS ED NURSE REASSESS COMMENT FT1
report received from RN Hiral, pt A&Ox4, pt appears in no distress, respirations equal and non labored. daughter at bedside. ED tech at bedside performing EKG.

## 2018-10-03 NOTE — ED ADULT NURSE NOTE - OBJECTIVE STATEMENT
Pt brought in by family member for agitation. Pt discharged from [facility] today. Pt febrile. IVL placed. Bloods drawn. Will continue to monitor.

## 2018-10-03 NOTE — CHART NOTE - NSCHARTNOTEFT_GEN_A_CORE
F/U on Asymptomatic Transaminitis:    Labs reviewed and LFTs continue to trend down. EBV c/w past infection. RVP + Entero/Rhinovirus.   -still pending CMV and hepatitis panel  -If Patient is to be discharged today, please have patient f/u w/ outpatient PMD for further monitoring Okay to refill cough syrup ×1 and also a Medrol Dosepak as directed

## 2018-10-03 NOTE — ED ADULT TRIAGE NOTE - CHIEF COMPLAINT QUOTE
pt d/c from [facility] earlier today, c/o feeling agitated and nervous. states she did not want to go back to assisted facility. pt febrile in triage. reports non productive cough. denies dysuria.

## 2018-10-03 NOTE — ED PROVIDER NOTE - ATTENDING CONTRIBUTION TO CARE
Bernard 78F p/w fever, sent from [facility], was having a cough also.  Pt was c/o anxiety and nervousness, scared at SNF.  Recent admission at [facility], had fever near the end of the hospitalization.  Was treated at [facility] for UTI (during august portion of hospitalization).  No CP/SOB.  (+)Cough.  Aao x 2-3.  No SI/HI, no AVH.  No abd pain.  Pt with urinary frequency and suprapubic tenderness on exam, appears dry.  Imp - sepsis; rx abx, fluids, check labs, urine, CT given abnl LFT and SP tenderness, likely admit.    VS:  fever, tachycardia    GEN - malaise; A+O x3 (+)[place]  HEAD - NC/AT     ENT - PEERL, EOMI, mucous membranes dry , no discharge      NECK: Neck supple, non-tender without lymphadenopathy, no masses, no JVD  PULM - CTA b/l,  symmetric breath sounds  COR -  normal heart sounds    ABD - , ND, suprapubic ttp, soft,  BACK - no CVA tenderness, nontender spine     EXTREMS - no edema, no deformity, warm and well perfused    SKIN - no rash or bruising      NEUROLOGIC - alert, CN 2-12 intact, sensation nl, motor no focal deficit. Bernard 78F p/w fever, sent from [facility], was having a cough also.  Pt was c/o anxiety and nervousness, scared at SNF.  Recent admission at [facility], had fever near the end of the hospitalization.  Was treated at [facility] for UTI (during august portion of hospitalization).  No CP/SOB.  (+)Cough.  Aao x 2-3.  No SI/HI, no AVH.  No abd pain.  Pt with urinary frequency and suprapubic tenderness on exam, appears dry.  Imp - sepsis; rx abx, fluids, check labs, urine, CT given abnl LFT and SP tenderness, likely admit.  EKG  - SR at 94 no ramses no std  no twi.  pr 138 qtc 420  VS:  fever, tachycardia    GEN - malaise; A+O x3 (+)[place]  HEAD - NC/AT     ENT - PEERL, EOMI, mucous membranes dry , no discharge      NECK: Neck supple, non-tender without lymphadenopathy, no masses, no JVD  PULM - CTA b/l,  symmetric breath sounds  COR -  normal heart sounds    ABD - , ND, suprapubic ttp, soft,  BACK - no CVA tenderness, nontender spine     EXTREMS - no edema, no deformity, warm and well perfused    SKIN - no rash or bruising      NEUROLOGIC - alert, CN 2-12 intact, sensation nl, motor no focal deficit.

## 2018-10-03 NOTE — ED PROVIDER NOTE - OBJECTIVE STATEMENT
78 year old female with a PMHx of ?schizophrenia, anxiety, depression - recently d/c'ed from [facility] today (admitted for 1 month) for SI brought today in for agitation, anxiety and "feeling scared" once arriving at assisted living facility. Pts daughter is at the bedside and states that she had a cough and fever for the past 4 days - +rhino/enterovirus on 10/2. +urinary frequency. Denies n/v, abdominal pain, dysuria, hematuria, melena, hematochezia, diarrhea.

## 2018-10-03 NOTE — ED PROVIDER NOTE - PROGRESS NOTE DETAILS
pt signed out pending CT scan which showed possible choledoco - but no signs of itis fver likely related to viral infection that is confirmed.  Per plan will admit to hsopitalist.  Dr Abi power and SRINIVAS newsome

## 2018-10-03 NOTE — ED ADULT NURSE NOTE - NSIMPLEMENTINTERV_GEN_ALL_ED
Implemented All Fall with Harm Risk Interventions:  [place] to call system. Call bell, personal items and telephone within reach. Instruct patient to call for assistance. Room bathroom lighting operational. Non-slip footwear when patient is off stretcher. Physically safe environment: no spills, clutter or unnecessary equipment. Stretcher in lowest position, wheels locked, appropriate side rails in place. Provide visual cue, wrist band, yellow gown, etc. Monitor gait and stability. Monitor for mental status changes and reorient to person, place, and time. Review medications for side effects contributing to fall risk. Reinforce activity limits and safety measures with patient and family. Provide visual clues: red socks.

## 2018-10-04 DIAGNOSIS — A41.9 SEPSIS, UNSPECIFIED ORGANISM: ICD-10-CM

## 2018-10-04 DIAGNOSIS — N28.89 OTHER SPECIFIED DISORDERS OF KIDNEY AND URETER: ICD-10-CM

## 2018-10-04 DIAGNOSIS — R50.9 FEVER, UNSPECIFIED: ICD-10-CM

## 2018-10-04 DIAGNOSIS — F32.9 MAJOR DEPRESSIVE DISORDER, SINGLE EPISODE, UNSPECIFIED: ICD-10-CM

## 2018-10-04 DIAGNOSIS — F41.9 ANXIETY DISORDER, UNSPECIFIED: ICD-10-CM

## 2018-10-04 DIAGNOSIS — C50.919 MALIGNANT NEOPLASM OF UNSPECIFIED SITE OF UNSPECIFIED FEMALE BREAST: ICD-10-CM

## 2018-10-04 DIAGNOSIS — F20.9 SCHIZOPHRENIA, UNSPECIFIED: ICD-10-CM

## 2018-10-04 DIAGNOSIS — R10.9 UNSPECIFIED ABDOMINAL PAIN: ICD-10-CM

## 2018-10-04 DIAGNOSIS — R74.0 NONSPECIFIC ELEVATION OF LEVELS OF TRANSAMINASE AND LACTIC ACID DEHYDROGENASE [LDH]: ICD-10-CM

## 2018-10-04 DIAGNOSIS — Z02.9 ENCOUNTER FOR ADMINISTRATIVE EXAMINATIONS, UNSPECIFIED: ICD-10-CM

## 2018-10-04 LAB
HAV IGM SER-ACNC: NONREACTIVE — SIGNIFICANT CHANGE UP
HBV CORE IGM SER-ACNC: NONREACTIVE — SIGNIFICANT CHANGE UP
HBV SURFACE AG SER-ACNC: NONREACTIVE — SIGNIFICANT CHANGE UP
HCV AB S/CO SERPL IA: 0.28 S/CO — SIGNIFICANT CHANGE UP
HCV AB SERPL-IMP: SIGNIFICANT CHANGE UP
SPECIMEN SOURCE: SIGNIFICANT CHANGE UP
SPECIMEN SOURCE: SIGNIFICANT CHANGE UP

## 2018-10-04 PROCEDURE — 76770 US EXAM ABDO BACK WALL COMP: CPT | Mod: 26

## 2018-10-04 PROCEDURE — 90792 PSYCH DIAG EVAL W/MED SRVCS: CPT

## 2018-10-04 PROCEDURE — 99223 1ST HOSP IP/OBS HIGH 75: CPT | Mod: AI

## 2018-10-04 RX ORDER — TRAZODONE HCL 50 MG
50 TABLET ORAL AT BEDTIME
Qty: 0 | Refills: 0 | Status: DISCONTINUED | OUTPATIENT
Start: 2018-10-04 | End: 2018-10-06

## 2018-10-04 RX ORDER — SERTRALINE 25 MG/1
150 TABLET, FILM COATED ORAL DAILY
Qty: 0 | Refills: 0 | Status: DISCONTINUED | OUTPATIENT
Start: 2018-10-04 | End: 2018-10-06

## 2018-10-04 RX ORDER — SENNA PLUS 8.6 MG/1
2 TABLET ORAL AT BEDTIME
Qty: 0 | Refills: 0 | Status: DISCONTINUED | OUTPATIENT
Start: 2018-10-04 | End: 2018-10-06

## 2018-10-04 RX ORDER — DOCUSATE SODIUM 100 MG
100 CAPSULE ORAL THREE TIMES A DAY
Qty: 0 | Refills: 0 | Status: DISCONTINUED | OUTPATIENT
Start: 2018-10-04 | End: 2018-10-06

## 2018-10-04 RX ORDER — ENOXAPARIN SODIUM 100 MG/ML
40 INJECTION SUBCUTANEOUS DAILY
Qty: 0 | Refills: 0 | Status: DISCONTINUED | OUTPATIENT
Start: 2018-10-04 | End: 2018-10-06

## 2018-10-04 RX ORDER — LANOLIN ALCOHOL/MO/W.PET/CERES
3 CREAM (GRAM) TOPICAL AT BEDTIME
Qty: 0 | Refills: 0 | Status: DISCONTINUED | OUTPATIENT
Start: 2018-10-04 | End: 2018-10-06

## 2018-10-04 RX ORDER — LETROZOLE 2.5 MG/1
2.5 TABLET, FILM COATED ORAL DAILY
Qty: 0 | Refills: 0 | Status: DISCONTINUED | OUTPATIENT
Start: 2018-10-04 | End: 2018-10-06

## 2018-10-04 RX ORDER — SODIUM CHLORIDE 9 MG/ML
1000 INJECTION INTRAMUSCULAR; INTRAVENOUS; SUBCUTANEOUS
Qty: 0 | Refills: 0 | Status: DISCONTINUED | OUTPATIENT
Start: 2018-10-04 | End: 2018-10-06

## 2018-10-04 RX ORDER — QUETIAPINE FUMARATE 200 MG/1
100 TABLET, FILM COATED ORAL THREE TIMES A DAY
Qty: 0 | Refills: 0 | Status: DISCONTINUED | OUTPATIENT
Start: 2018-10-04 | End: 2018-10-06

## 2018-10-04 RX ADMIN — Medication 1000 MILLIGRAM(S): at 00:25

## 2018-10-04 RX ADMIN — ENOXAPARIN SODIUM 40 MILLIGRAM(S): 100 INJECTION SUBCUTANEOUS at 12:54

## 2018-10-04 RX ADMIN — Medication 50 MILLIGRAM(S): at 21:19

## 2018-10-04 RX ADMIN — SODIUM CHLORIDE 50 MILLILITER(S): 9 INJECTION INTRAMUSCULAR; INTRAVENOUS; SUBCUTANEOUS at 10:20

## 2018-10-04 RX ADMIN — QUETIAPINE FUMARATE 100 MILLIGRAM(S): 200 TABLET, FILM COATED ORAL at 21:20

## 2018-10-04 RX ADMIN — Medication 3 MILLIGRAM(S): at 21:19

## 2018-10-04 RX ADMIN — Medication 100 MILLIGRAM(S): at 21:19

## 2018-10-04 RX ADMIN — Medication 1 MILLIGRAM(S): at 17:57

## 2018-10-04 RX ADMIN — QUETIAPINE FUMARATE 100 MILLIGRAM(S): 200 TABLET, FILM COATED ORAL at 15:18

## 2018-10-04 RX ADMIN — LETROZOLE 2.5 MILLIGRAM(S): 2.5 TABLET, FILM COATED ORAL at 21:19

## 2018-10-04 RX ADMIN — Medication 100 MILLIGRAM(S): at 15:18

## 2018-10-04 RX ADMIN — SERTRALINE 150 MILLIGRAM(S): 25 TABLET, FILM COATED ORAL at 12:54

## 2018-10-04 RX ADMIN — SENNA PLUS 2 TABLET(S): 8.6 TABLET ORAL at 21:19

## 2018-10-04 NOTE — BEHAVIORAL HEALTH ASSESSMENT NOTE - NSBHCHARTREVIEWLAB_PSY_A_CORE FT
10-03    136  |  98  |  27<H>  ----------------------------<  127<H>  4.2   |  24  |  0.88    Ca    9.1      03 Oct 2018 18:50    TPro  7.2  /  Alb  3.7  /  TBili  0.4  /  DBili  x   /  AST  40<H>  /  ALT  424<H>  /  AlkPhos  299<H>  10-03                          9.8    7.99  )-----------( 228      ( 03 Oct 2018 18:50 )             29.7

## 2018-10-04 NOTE — ED ADULT NURSE REASSESSMENT NOTE - NS ED NURSE REASSESS COMMENT FT1
pt placed on bedpan at pt request, voided 200ml clear yellow urine. pt cleaned, dried and repositioned. pt resting, appears comfortable. no distress noted. will monitor.

## 2018-10-04 NOTE — ED ADULT NURSE REASSESSMENT NOTE - NS ED NURSE REASSESS COMMENT FT1
pt placed on bedpan, voided clear yellow urine. pt cleaned dried and repositioned, now resting appears comfortable. respirations equal and nonlabored. daughter at bedside.

## 2018-10-04 NOTE — BEHAVIORAL HEALTH ASSESSMENT NOTE - OTHER
movements of lips and tongue may be consistent with TD vs. adentate impairment 2/2 lack of bottom teeth goal directed

## 2018-10-04 NOTE — BEHAVIORAL HEALTH ASSESSMENT NOTE - OTHER PAST PSYCHIATRIC HISTORY (INCLUDE DETAILS REGARDING ONSET, COURSE OF ILLNESS, INPATIENT/OUTPATIENT TREATMENT)
recently discharged from [facility] where she was treated for MDD with SI.  Per d/c summary: "The pt. is a 78 y/o female who was admitted to [facility] from placement after she called the  there and   stated that she wanted to kill herself.  The pt. stated that she was feeling depressed and abandoned after her daughter put her into placement. She had been living with her after the death of her  but the daughter felt no longer able to care for her due to her 's acute medical problems and increased care needed for him.  The pt. admitted to having positive vegetative signs and symptoms and increased anxiety.  She reported poor sleep and was very somatic with generalized aches and pains.  She has a history of major depressive episodes in the past.      The pt. was admitted to [facility] inpatient services.  She was very withdrawn and depressed and was poorly verbal.  Her intake was decreased and she was sleeping poorly.  She was very somatic and had aches and pains throughout her body. She denies that she was suicidal but needed to get out of her placement which she reported that she did not like.  The pt. was continues on zoloft and was placed on Seroquel.  As these were titrated she was also given ativan due to severe anxiety especially in the morning.  She remained somatic to delusional proportions.  She still slept poorly and was continued on trazodone with melatonin added.  Slowly her depression and somatic delusions waned.  She is now back to her baseline.  She is not depressed but remains slightly somatic.  She is not suicidal."

## 2018-10-04 NOTE — PATIENT PROFILE ADULT. - FALL HARM RISK
other/age(85 years old or older)/coagulation(Bleeding disorder R/T clinical cond/anti-coags)/bones(Osteoporosis,prev fx,steroid use,metastatic bone ca

## 2018-10-04 NOTE — ED ADULT NURSE REASSESSMENT NOTE - NS ED NURSE REASSESS COMMENT FT1
pt resting with eyes closed, easily arousable to voice, respirations equal and non labored. vital signs obtained. comfort measures provided, pt continues to rest.

## 2018-10-04 NOTE — H&P ADULT - PROBLEM SELECTOR PLAN 3
continue Psych meds likely secondary to constipation vs urinary retention   CT abdomen -stool, distended bladder.  No bowel obstruction no evidence of fecal impaction  Will order bladder scan, If > 250 ml, will need straight cath  Will order Laxatives   continue to monitor

## 2018-10-04 NOTE — BEHAVIORAL HEALTH ASSESSMENT NOTE - NSBHMEDSOTHERFT_PSY_A_CORE
Home Medications:  letrozole 2.5 mg oral tablet: 1 tab(s) orally once a day (04 Oct 2018 10:28)  LORazepam 1 mg oral tablet: 1 tab(s) orally 2 times a day (04 Oct 2018 10:28)  melatonin 3 mg oral tablet: 1 tab(s) orally once a day (at bedtime) (04 Oct 2018 10:28)  QUEtiapine 100 mg oral tablet: 1 tab(s) orally 3 times a day (04 Oct 2018 10:28)  sertraline: 150 milligram(s) orally once a day (04 Oct 2018 10:28)  traZODone 50 mg oral tablet: 1 tab(s) orally once a day (at bedtime) (04 Oct 2018 10:28)

## 2018-10-04 NOTE — H&P ADULT - PROBLEM SELECTOR PLAN 2
Pt currently asymptomatic  LFTS started to trend up between  9/21 and 9/30, now  trending down .T bili- WNL, INR -1.18  Possibilities are passed stone vs secondary to hypotension ( as per note- episode of hypotension on 9/29)     CT abdomen with Cholelithiasis without sonographic evidence of acute cholecystitis.   A punctate calcification in the duodenum near the expected location of   the ampulla is suspicious for choledocholithiasis and/or recently passed   renal stone   continue to trend  CMP and coags Pt currently asymptomatic  LFTS started to trend up between  9/21 and 9/30, now  trending down .T bili- WNL, INR -1.18  Possibilities are passed stone vs secondary to hypotension ( as per note- episode of hypotension on 9/29)   CT abdomen with Cholelithiasis without sonographic evidence of acute cholecystitis.   A punctate calcification in the duodenum near the expected location of the ampulla is suspicious for choledocholithiasis and/or recently passed renal stone   continue to trend  CMP and coags

## 2018-10-04 NOTE — H&P ADULT - HISTORY OF PRESENT ILLNESS
78 year old female with a PMHx of ?schizophrenia, anxiety, depression - recently d/c'ed from [facility] today (admitted for 1 month) for SI brought today in for agitation, anxiety and "feeling scared" once arriving at assisted living facility. Pts daughter is at the bedside and states that she had a cough and fever for the past 4 days - +rhino/enterovirus on 10/2. +urinary frequency. Denies n/v, abdominal pain, dysuria, hematuria, melena, hematochezia, diarrhea.     Pt was recently admitted to [facility] from 8/14-10/3  for depression,SI. At [facility], she received 3 courses of ABx during the month of August: Keflex x 5 days, Bactrim x 5 days and Macrobid    In the ED, pt with a Tmax of 100.5, HR of 113, SBP of 135/69, RR of 18, O2 sats of 98 % on RA   RVP was positive for enterovirus, pt was given Vanc/Cefepime and Cipro x 1 dose  and was admitted 78 year old female with a PMH of schizophrenia, anxiety, depression, recently d/c'ed from [facility] today (admitted for 1 month) for SI admitted to the hsopital for agitation, anxiety and "feeling scared" once arriving at assisted living facility.  Unable to obtain much history from pt  She states that she is in the hospital as she was scared the facility. She felt lonely and had no friends, so she asked her daughter to bring her to the hospital.   She c/o left sided abdominal pain and suprapubic pain,  about 3-4/10, started yesterday   I called daughter Lea and obtain collateral  As per Lea, her mom  was not feeling  well at the AF both physically and mentally. She had a cold and her face turned red which prompted her to return to the ER.  Daughter also reports mom has chronic urinary issues- urinary frequency and was told she has a uterine prolapse at a previous admission   Denies n/v,dysuria, hematuria, melena, hematochezia, diarrhea.  Pt was recently admitted to [facility] from 8/14-10/3  for depression,SI. At [facility], she received 3 courses of ABx during the month of August: Keflex x 5 days, Bactrim x 5 days and Macrobid    At baseline- pt walks with a walker, needs help with showering and dressing     In the ED, pt with a Tmax of 100.5, HR of 113, SBP of 135/69, RR of 18, O2 sats of 98 % on RA   RVP was positive for enterovirus, pt was given Vanc/Cefepime and Cipro x 1 dose  and was admitted 78 year old female with a PMH of schizophrenia, anxiety, depression, recently d/c'ed from [facility] today (admitted for 1 month) for SI admitted to the hsopital for agitation, anxiety and "feeling scared" after she arrived  at assisted living facility( [facility])  for 1 day.  Unable to obtain much history from pt  She states that she is in the hospital as she was scared at the facility. She felt lonely and had no friends, so she asked her daughter to bring her to the hospital.   She c/o left sided abdominal pain and suprapubic pain,  about 3-4/10, started yesterday   I called daughter Lea and obtain collateral  As per Lea, her mom  was not feeling  well at the AF both physically and mentally. She had a cold and her face turned red which prompted her to return to the ER.  Daughter also reports mom has chronic urinary issues- urinary frequency and was told she has a uterine prolapse at a previous admission   Denies n/v,dysuria, hematuria, melena, hematochezia, diarrhea.  Pt was recently admitted to [facility] from 8/14-10/3  for depression,SI. At [facility], she received 3 courses of ABx during the month of August: Keflex x 5 days, Bactrim x 5 days and Macrobid    At baseline- pt walks with a walker, needs help with showering and dressing     In the ED, pt with a Tmax of 100.5, HR of 113, SBP of 135/69, RR of 18, O2 sats of 98 % on RA   RVP was positive for enterovirus, pt was given Vanc/Cefepime and Cipro x 1 dose  and was admitted

## 2018-10-04 NOTE — H&P ADULT - NEGATIVE CARDIOVASCULAR SYMPTOMS
no claudication/no chest pain/no palpitations/no orthopnea/no paroxysmal nocturnal dyspnea/no peripheral edema/no dyspnea on exertion

## 2018-10-04 NOTE — BEHAVIORAL HEALTH ASSESSMENT NOTE - CASE SUMMARY
77yo woman with history of depression and anxiety, recently admitted to [facility] for 1.5 months for depression and SI (discharged 10/3/18) who presented with fever and cough, found to have +enterovirus on RVP and admitted for sepsis, psychiatry consulted for agitation and medication management. Patient seen and evaluated, AAOX3, appears somewhat irritable and anxious but was cooperative. She denies acute psychotic sxs, denies SI and HI. Patient stated that when she takes her medications she feels better.  Recommend medications as written above, monitor EKG for qtc, will follow

## 2018-10-04 NOTE — BEHAVIORAL HEALTH ASSESSMENT NOTE - NSBHCONSULTFOLLOWAFTERCARE_PSY_A_CORE FT
Patient has appt with Dr. García in [facility] at [facility], [address], [place] [address] on 10/12 at 12:15pm scheduled on d/c from [facility].      [facility] can be reached at [phone]

## 2018-10-04 NOTE — BEHAVIORAL HEALTH ASSESSMENT NOTE - RISK ASSESSMENT
Patient with no SI, not hopeless, close psychiatric follow up, treatment compliance all mitigate risk for harm to self posed by mood disorder, perceived impending isolation.  Overall low risk for imminent suicide attempt.

## 2018-10-04 NOTE — H&P ADULT - PROBLEM SELECTOR PLAN 1
pt met sepsis criteria on admission with T of 100.5 and HR of 113  Sepsis likely secondary to Enterovirus  UA- negative, prelim chest Xray- no urgent findings   will continue supportive care- Cough suppressant, fluids  FU BC pt met sepsis criteria on admission with T of 100.5 and HR of 113  Sepsis likely secondary to Enterovirus  UA- negative, prelim chest Xray- no urgent findings   will continue supportive care- Cough suppressant, fluids  FU BC  Will monitor off Abx for now.However, if pt contines to have fevers or clinically decompensates, low threshold to start ABx

## 2018-10-04 NOTE — BEHAVIORAL HEALTH ASSESSMENT NOTE - NSBHCHARTREVIEWVS_PSY_A_CORE FT
Vital Signs Last 24 Hrs  T(C): 36.9 (04 Oct 2018 04:16), Max: 38.1 (03 Oct 2018 18:04)  T(F): 98.4 (04 Oct 2018 04:16), Max: 100.5 (03 Oct 2018 18:04)  HR: 74 (04 Oct 2018 04:16) (74 - 113)  BP: 142/63 (04 Oct 2018 04:16) (123/79 - 142/63)  BP(mean): --  RR: 16 (04 Oct 2018 04:16) (16 - 18)  SpO2: 98% (04 Oct 2018 04:16) (98% - 98%)

## 2018-10-04 NOTE — H&P ADULT - PROBLEM SELECTOR PLAN 7
DW Daughter LeaBlaze Lea concerned pt may need a higher level of care on dc  Will obtain PT/SW consult

## 2018-10-04 NOTE — BEHAVIORAL HEALTH ASSESSMENT NOTE - NSBHCONSULTMEDS_PSY_A_CORE FT
LORazepam     Tablet 1 milliGRAM(s) Oral two times a day  melatonin 3 milliGRAM(s) Oral at bedtime  QUEtiapine 100 milliGRAM(s) Oral three times a day  sertraline 150 milliGRAM(s) Oral daily  traZODone 50 milliGRAM(s) Oral at bedtime

## 2018-10-04 NOTE — H&P ADULT - ASSESSMENT
78 year old female with a PMHx of ?schizophrenia, anxiety, depression - recently d/c'ed from [facility] today (admitted for 1 month) for SI brought today in for agitation, anxiety and "feeling scared" once arriving at assisted living facility 78 year old female with a PMHx of ?schizophrenia, anxiety, depression - recently d/c'ed from [facility] today (admitted for 1 month) for SI brought today in for agitation, anxiety and "feeling scared" once arriving at assisted living facility  EKG - NSR

## 2018-10-04 NOTE — H&P ADULT - PROBLEM SELECTOR PLAN 4
On letrazole continue Psych meds CT abdomen of 4.6 x 4.4 cm indeterminant right lower pole renal   Advise Ultrasound or MRI  to determine if this is cystic or solid mass.  Will obtain Renal ultrasound   Also incidental Small left adnexal cyst, of doubtful clinical significance noted on CT abdomen. Dw Daughter Lea - not aware of cyst previously.

## 2018-10-04 NOTE — BEHAVIORAL HEALTH ASSESSMENT NOTE - HPI (INCLUDE ILLNESS QUALITY, SEVERITY, DURATION, TIMING, CONTEXT, MODIFYING FACTORS, ASSOCIATED SIGNS AND SYMPTOMS)
79yo woman with history of depression and anxiety, recently admitted to [facility] for 1.5 months for depression and SI (discharged 10/3/18) who presented with fever and cough, found to have +enterovirus on RVP and admitted for sepsis, psychiatry consulted for agitation and medication management.  Patient reports she came to the hospital because “I am really sick.” Patient states she has been coughing, having shortness of breath since leaving [facility] yesterday, returning to a new assisted living facility. On evaluation, the patient is A&Ox4. Appears irritable and anxious, but displays good behavioral control throughout interview. Patient states she is worried because “I haven’t had my medicine and that’s what will make me feel better.” States “I need my medicine” several times during interview. Denies depressed mood, SI/HI, AH/VH. Future-oriented, patient states “I want to live.” Patient also reports she has a history of schizophrenia, but this is not included in records from her recent admission to [facility] where she was diagnosed with MDD. Appears to have some difficulty speaking, due to no mandibular teeth vs. possible tardive dyskinesia.   Per collateral from daughter Lea contacted by primary team, patient requires help with walking, eating, dressing, felt unwell on arrival to [facility] yesterday.  Per previous ED note, patient was residing at an assisted living facility before [facility] admission, told daughter she felt depressed and lonely, threatened to hurt herself, prompting inpatient psychiatric admission. Was discharged from [facility] to [facility] assisted living facility.

## 2018-10-05 LAB
ALBUMIN SERPL ELPH-MCNC: 3.2 G/DL — LOW (ref 3.3–5)
ALP SERPL-CCNC: 234 U/L — HIGH (ref 40–120)
ALT FLD-CCNC: 235 U/L — HIGH (ref 4–33)
AST SERPL-CCNC: 29 U/L — SIGNIFICANT CHANGE UP (ref 4–32)
BACTERIA UR CULT: SIGNIFICANT CHANGE UP
BILIRUB SERPL-MCNC: 0.4 MG/DL — SIGNIFICANT CHANGE UP (ref 0.2–1.2)
BUN SERPL-MCNC: 12 MG/DL — SIGNIFICANT CHANGE UP (ref 7–23)
BUN SERPL-MCNC: 12 MG/DL — SIGNIFICANT CHANGE UP (ref 7–23)
CALCIUM SERPL-MCNC: 8.4 MG/DL — SIGNIFICANT CHANGE UP (ref 8.4–10.5)
CALCIUM SERPL-MCNC: 8.4 MG/DL — SIGNIFICANT CHANGE UP (ref 8.4–10.5)
CHLORIDE SERPL-SCNC: 99 MMOL/L — SIGNIFICANT CHANGE UP (ref 98–107)
CHLORIDE SERPL-SCNC: 99 MMOL/L — SIGNIFICANT CHANGE UP (ref 98–107)
CO2 SERPL-SCNC: 23 MMOL/L — SIGNIFICANT CHANGE UP (ref 22–31)
CO2 SERPL-SCNC: 23 MMOL/L — SIGNIFICANT CHANGE UP (ref 22–31)
CREAT SERPL-MCNC: 0.73 MG/DL — SIGNIFICANT CHANGE UP (ref 0.5–1.3)
CREAT SERPL-MCNC: 0.73 MG/DL — SIGNIFICANT CHANGE UP (ref 0.5–1.3)
GLUCOSE SERPL-MCNC: 106 MG/DL — HIGH (ref 70–99)
GLUCOSE SERPL-MCNC: 106 MG/DL — HIGH (ref 70–99)
HCT VFR BLD CALC: 29.7 % — LOW (ref 34.5–45)
HGB BLD-MCNC: 10 G/DL — LOW (ref 11.5–15.5)
MCHC RBC-ENTMCNC: 29.2 PG — SIGNIFICANT CHANGE UP (ref 27–34)
MCHC RBC-ENTMCNC: 33.7 % — SIGNIFICANT CHANGE UP (ref 32–36)
MCV RBC AUTO: 86.8 FL — SIGNIFICANT CHANGE UP (ref 80–100)
NRBC # FLD: 0 — SIGNIFICANT CHANGE UP
PLATELET # BLD AUTO: 213 K/UL — SIGNIFICANT CHANGE UP (ref 150–400)
PMV BLD: 9 FL — SIGNIFICANT CHANGE UP (ref 7–13)
POTASSIUM SERPL-MCNC: 3.8 MMOL/L — SIGNIFICANT CHANGE UP (ref 3.5–5.3)
POTASSIUM SERPL-MCNC: 3.8 MMOL/L — SIGNIFICANT CHANGE UP (ref 3.5–5.3)
POTASSIUM SERPL-SCNC: 3.8 MMOL/L — SIGNIFICANT CHANGE UP (ref 3.5–5.3)
POTASSIUM SERPL-SCNC: 3.8 MMOL/L — SIGNIFICANT CHANGE UP (ref 3.5–5.3)
PROT SERPL-MCNC: 6.3 G/DL — SIGNIFICANT CHANGE UP (ref 6–8.3)
RBC # BLD: 3.42 M/UL — LOW (ref 3.8–5.2)
RBC # FLD: 12.3 % — SIGNIFICANT CHANGE UP (ref 10.3–14.5)
SODIUM SERPL-SCNC: 136 MMOL/L — SIGNIFICANT CHANGE UP (ref 135–145)
SODIUM SERPL-SCNC: 136 MMOL/L — SIGNIFICANT CHANGE UP (ref 135–145)
SPECIMEN SOURCE: SIGNIFICANT CHANGE UP
WBC # BLD: 6.74 K/UL — SIGNIFICANT CHANGE UP (ref 3.8–10.5)
WBC # FLD AUTO: 6.74 K/UL — SIGNIFICANT CHANGE UP (ref 3.8–10.5)

## 2018-10-05 PROCEDURE — 99233 SBSQ HOSP IP/OBS HIGH 50: CPT

## 2018-10-05 PROCEDURE — 99232 SBSQ HOSP IP/OBS MODERATE 35: CPT

## 2018-10-05 RX ADMIN — ENOXAPARIN SODIUM 40 MILLIGRAM(S): 100 INJECTION SUBCUTANEOUS at 14:19

## 2018-10-05 RX ADMIN — SENNA PLUS 2 TABLET(S): 8.6 TABLET ORAL at 21:31

## 2018-10-05 RX ADMIN — Medication 1 MILLIGRAM(S): at 17:45

## 2018-10-05 RX ADMIN — SERTRALINE 150 MILLIGRAM(S): 25 TABLET, FILM COATED ORAL at 14:19

## 2018-10-05 RX ADMIN — LETROZOLE 2.5 MILLIGRAM(S): 2.5 TABLET, FILM COATED ORAL at 21:35

## 2018-10-05 RX ADMIN — Medication 100 MILLIGRAM(S): at 05:27

## 2018-10-05 RX ADMIN — Medication 100 MILLIGRAM(S): at 23:25

## 2018-10-05 RX ADMIN — QUETIAPINE FUMARATE 100 MILLIGRAM(S): 200 TABLET, FILM COATED ORAL at 14:19

## 2018-10-05 RX ADMIN — Medication 50 MILLIGRAM(S): at 21:31

## 2018-10-05 RX ADMIN — QUETIAPINE FUMARATE 100 MILLIGRAM(S): 200 TABLET, FILM COATED ORAL at 05:27

## 2018-10-05 RX ADMIN — Medication 1 MILLIGRAM(S): at 05:27

## 2018-10-05 RX ADMIN — Medication 100 MILLIGRAM(S): at 21:30

## 2018-10-05 RX ADMIN — QUETIAPINE FUMARATE 100 MILLIGRAM(S): 200 TABLET, FILM COATED ORAL at 21:31

## 2018-10-05 RX ADMIN — Medication 3 MILLIGRAM(S): at 21:31

## 2018-10-05 NOTE — PROGRESS NOTE BEHAVIORAL HEALTH - RISK ASSESSMENT
Patient with no SI, not hopeless, euthymic today, has close psychiatric follow up, treatment compliance all mitigate risk for harm to self posed by mood disorder, perceived impending isolation.  Overall low risk for imminent suicide attempt. Risk factors; H/O depression, anxiety and SI, recent admission to [facility]  Protective factors; Hopeful, treatment seeking, future oriented, denies feeling depressed, denies SI and HI. Compliant with meds.   Patient with no SI, not hopeless, euthymic today, has close psychiatric follow up, treatment compliance all mitigate risk for harm to self posed by mood disorder, perceived impending isolation.  Overall low risk for imminent suicide attempt.

## 2018-10-05 NOTE — PROGRESS NOTE BEHAVIORAL HEALTH - NSBHFUPINTERVALHXFT_PSY_A_CORE
Patient was seen for follow up of medication management for MDD.  Patient received no PRN medications, no acute events overnight.  Patient is in good spirits, reporting good mood, future oriented.  Reports good appetite and "the best night of sleep I have gotten in a while."  Patient expresses desire to live, feels comfortable in the hospital.  Complains of URI symptoms.  No other complaints. Patient was seen for follow up of medication management for MDD.  Patient received no PRN medications, no acute events overnight.  Patient is in good spirits, reporting good mood, future oriented.  Reports good appetite and "the best night of sleep I have gotten in a while."  Patient expresses desire to live, feels comfortable in the hospital.  Complains of URI symptoms.  No other complaints.  Denies SI and HI, denies acute psychotic sxs.

## 2018-10-05 NOTE — SWALLOW BEDSIDE ASSESSMENT ADULT - COMMENTS
Pt was awake, cooperative though confusion noted for a clinical assessment of swallow function this am. Per charting, pt is an 78 year old female with a PMHx of ?schizophrenia, anxiety, depression - recently d/c'ed from [facility] (admitted for 1 month) for suicidal ideation, who presented to [facility] with agitation, anxiety and "feeling scared" with subsequent admission for sepsis likely due to enterovirus. Recent CXR revealed "Rounded opacity only seen on lateral projection could represent confluence of pulmonary veins. Comment: Subsequent CT abdomen confirms the impression. "

## 2018-10-05 NOTE — PROGRESS NOTE BEHAVIORAL HEALTH - OTHER
movements of lips and tongue may be consistent with TD vs. adentate not assessed impairment 2/2 lack of bottom teeth

## 2018-10-05 NOTE — SWALLOW BEDSIDE ASSESSMENT ADULT - SWALLOW EVAL: DIAGNOSIS
1- functional oral management given puree, honey thick liquid, nectar thick liquid and thin liquid textures marked by adequate bolus collection, transfer and transport. 2- mild oral dysphagia given solids marked by prolonged mastication resulting in delayed oral preparation/AP transit. 3-mild pharyngeal dysphagia given solids, puree, honey thick liquid and nectar thick liquids marked by delayed swallow trigger and reduced hyolaryngeal excursion without any overt s/s of penetration/aspiration. 4- moderate-severe pharyngeal dysphagia given thin liquids marked by delayed swallow trigger and reduced hyolaryngeal excursion resulting in immediate throat clearing suggestive of penetration/aspiration.

## 2018-10-05 NOTE — PROGRESS NOTE BEHAVIORAL HEALTH - NSBHCHARTREVIEWLAB_PSY_A_CORE FT
10-05    136  |  99  |  12  ----------------------------<  106<H>  3.8   |  23  |  0.73    Ca    8.4      05 Oct 2018 06:40    TPro  6.3  /  Alb  3.2<L>  /  TBili  0.4  /  DBili  x   /  AST  29  /  ALT  235<H>  /  AlkPhos  234<H>  10-05                          10.0   6.74  )-----------( 213      ( 05 Oct 2018 06:40 )             29.7

## 2018-10-05 NOTE — PROGRESS NOTE BEHAVIORAL HEALTH - NSBHCHARTREVIEWVS_PSY_A_CORE FT
Vital Signs Last 24 Hrs  T(C): 36.7 (05 Oct 2018 05:26), Max: 37.7 (04 Oct 2018 15:24)  T(F): 98.1 (05 Oct 2018 05:26), Max: 99.8 (04 Oct 2018 15:24)  HR: 74 (05 Oct 2018 05:26) (74 - 95)  BP: 126/72 (05 Oct 2018 05:26) (126/72 - 144/50)  BP(mean): --  RR: 16 (05 Oct 2018 05:26) (15 - 18)  SpO2: 98% (05 Oct 2018 05:26) (97% - 98%)

## 2018-10-05 NOTE — PROGRESS NOTE ADULT - ASSESSMENT
78 year old female with a PMHx of ?schizophrenia, anxiety, depression - recently d/c'ed from [facility] yesterday (admitted for 1 month) for SI brought today in for agitation, anxiety and "feeling scared" once arriving at assisted living facility  EKG - NSR

## 2018-10-05 NOTE — PHYSICAL THERAPY INITIAL EVALUATION ADULT - PERTINENT HX OF CURRENT PROBLEM, REHAB EVAL
77 yo woman with history of depression and anxiety, recently admitted to [facility] for 1.5 months for depression and SI (discharged 10/3/18) who presented with fever and cough, found to have +enterovirus on RVP and admitted for sepsis,

## 2018-10-05 NOTE — SWALLOW BEDSIDE ASSESSMENT ADULT - ASR SWALLOW ASPIRATION MONITOR
oral hygiene/position upright (90Y)/change of breathing pattern/fever/pneumonia/throat clearing/upper respiratory infection/cough/gurgly voice

## 2018-10-05 NOTE — PROGRESS NOTE BEHAVIORAL HEALTH - SUMMARY
77yo woman with history of depression and anxiety, recently admitted to [facility] for 1.5 months for depression and SI (discharged 10/3/18) who presented with fever and cough, found to have +enterovirus on RVP and admitted for sepsis, psychiatry consulted for agitation and medication management.  Patient is calm on presentation, perseverates on importance of her psychiatric medications which she needs to feel better.  Denies SI/HI/I/P, denies AVH.  Denies depressed mood.  Patient has been in good behavioral control.      Plan:    1. no CO 1:1 required. Does not meet criteria for inpatient psych admission at this time.  2. MDD: continue with Zoloft 150mg daily and QUEtiapine 100 milliGRAM(s) Oral three times a day  3. anxiety: continue with LORazepam     Tablet 1 milliGRAM(s) Oral two times a day;   4. insomnia: continue with melatonin 3 milliGRAM(s) Oral at bedtime and traZODone 50 milliGRAM(s) Oral at bedtime  5. agitation: Seroquel 25mg PO q6h PRN for anxiety/ mild agitation; Ativan 0.5-1mg IM/IV for severe agitation.

## 2018-10-05 NOTE — SWALLOW BEDSIDE ASSESSMENT ADULT - PHARYNGEAL PHASE
Delayed pharyngeal swallow/Decreased laryngeal elevation Cough post oral intake/Delayed pharyngeal swallow/Decreased laryngeal elevation/Throat clear post oral intake

## 2018-10-05 NOTE — PROGRESS NOTE BEHAVIORAL HEALTH - CASE SUMMARY
79yo woman with history of depression and anxiety, recently admitted to [facility] for 1.5 months for depression and SI (discharged 10/3/18) who presented with fever and cough, found to have +enterovirus on RVP and admitted for sepsis, psychiatry consulted for agitation and medication management. Patient seen and evaluated, calm, pleasant, cooperative  and linear. Patient reported feeling good, eating and sleeping well. Denies feeling depressed, denies SI and HI. Denies acute psychotic  sxs. Patient is hopeful and future oriented. Recommend meds. as written above, monitor EKG for qtc, outpt. follow up appointment as above.

## 2018-10-05 NOTE — PROGRESS NOTE ADULT - PROBLEM SELECTOR PLAN 1
pt met sepsis criteria on admission with T of 100.5 and HR of 113  Sepsis likely secondary to Enterovirus  UA- negative, prelim chest Xray- no urgent findings   will continue supportive care- Cough suppressant, fluids  FU BC  Will monitor off Abx for now.However, if pt contines to have fevers or clinically decompensates, low threshold to start ABx

## 2018-10-05 NOTE — PROGRESS NOTE ADULT - PROBLEM SELECTOR PLAN 3
likely secondary to constipation vs urinary retention   CT abdomen -stool, distended bladder.  No bowel obstruction no evidence of fecal impaction  Will order bladder scan, If > 250 ml, will need straight cath  Will order Laxatives   continue to monitor

## 2018-10-05 NOTE — PROGRESS NOTE ADULT - ATTENDING COMMENTS
f/u cultures f/u cultures- negative    Renal US 10/5/18  simple cyst.    d/c planning to her facility once blood cult neg x 48 hrs. f/u cultures- negative    Renal US 10/5/18  simple cyst.    d/c planning to her facility once blood cult neg x 48 hrs.    Addendum  Called daughter Lea Stone  at [phone] - explained patient has entero-rhinovirus and simple cyst on CT.  Planning to return her to facility once blood cult neg x 48 hrs- daughter agrees  Lea Stone Cell [phone]

## 2018-10-05 NOTE — PROGRESS NOTE BEHAVIORAL HEALTH - NSBHCONSULTFOLLOWAFTERCARE_PSY_A_CORE FT
Patient has appt with Dr. García in [facility] at [facility], [address], [place] [address] on 10/12 at 12:15pm scheduled on d/c from [facility].      [facility] can be reached at [phone]

## 2018-10-05 NOTE — PROGRESS NOTE ADULT - SUBJECTIVE AND OBJECTIVE BOX
Patient is a 78y old  Female who presents with a chief complaint of Not feeling well x 1 day (05 Oct 2018 10:16)      SUBJECTIVE / OVERNIGHT EVENTS:    MEDICATIONS  (STANDING):  docusate sodium 100 milliGRAM(s) Oral three times a day  enoxaparin Injectable 40 milliGRAM(s) SubCutaneous daily  letrozole 2.5 milliGRAM(s) Oral daily  LORazepam     Tablet 1 milliGRAM(s) Oral two times a day  melatonin 3 milliGRAM(s) Oral at bedtime  QUEtiapine 100 milliGRAM(s) Oral three times a day  senna 2 Tablet(s) Oral at bedtime  sertraline 150 milliGRAM(s) Oral daily  sodium chloride 0.9%. 1000 milliLiter(s) (50 mL/Hr) IV Continuous <Continuous>  traZODone 50 milliGRAM(s) Oral at bedtime    MEDICATIONS  (PRN):    I&O's Summary    04 Oct 2018 07:01  -  05 Oct 2018 07:00  --------------------------------------------------------  IN: 0 mL / OUT: 560 mL / NET: -560 mL        PHYSICAL EXAM:  Vital Signs Last 24 Hrs  T(C): 36.7 (05 Oct 2018 05:26), Max: 37.7 (04 Oct 2018 15:24)  T(F): 98.1 (05 Oct 2018 05:26), Max: 99.8 (04 Oct 2018 15:24)  HR: 74 (05 Oct 2018 05:26) (74 - 95)  BP: 126/72 (05 Oct 2018 05:26) (126/72 - 144/50)  BP(mean): --  RR: 16 (05 Oct 2018 05:26) (15 - 18)  SpO2: 98% (05 Oct 2018 05:26) (97% - 98%)  GENERAL: NAD, well-developed  HEAD:  Atraumatic, Normocephalic  EYES: EOMI, PERRLA, conjunctiva and sclera clear  NECK: Supple, No JVD  CHEST/LUNG: Clear to auscultation bilaterally; No wheeze  HEART: Regular rate and rhythm; No murmurs, rubs, or gallops  ABDOMEN: Soft, Nontender, Nondistended; Bowel sounds present  EXTREMITIES:  2+ Peripheral Pulses, No clubbing, cyanosis, or edema  PSYCH: AAOx3  NEUROLOGY: non-focal  SKIN: No rashes or lesions    LABS:                        10.0   6.74  )-----------( 213      ( 05 Oct 2018 06:40 )             29.7     10    136  |  99  |  12  ----------------------------<  106<H>  3.8   |  23  |  0.73    Ca    8.4      05 Oct 2018 06:40    TPro  6.3  /  Alb  3.2<L>  /  TBili  0.4  /  DBili  x   /  AST  29  /  ALT  235<H>  /  AlkPhos  234<H>  10-05    PT/INR - ( 03 Oct 2018 18:50 )   PT: 13.1 SEC;   INR: 1.18          PTT - ( 03 Oct 2018 18:50 )  PTT:54.1 SEC      Urinalysis Basic - ( 03 Oct 2018 20:23 )    Color: LIGHT YELLOW / Appearance: CLEAR / S.015 / pH: 6.5  Gluc: NEGATIVE / Ketone: NEGATIVE  / Bili: NEGATIVE / Urobili: NORMAL   Blood: NEGATIVE / Protein: NEGATIVE / Nitrite: NEGATIVE   Leuk Esterase: MODERATE / RBC: 0-2 / WBC 0-2   Sq Epi: OCC / Non Sq Epi: x / Bacteria: NEGATIVE        RADIOLOGY & ADDITIONAL TESTS:    Imaging Personally Reviewed:    Consultant(s) Notes Reviewed:      Care Discussed with Consultants/Other Providers: Patient is a 78y old  Female who presents with a chief complaint of Not feeling well x 1 day (05 Oct 2018 10:16)      SUBJECTIVE / OVERNIGHT EVENTS:  I have a good appetite.  Need more breakfast.  President is Raf    MEDICATIONS  (STANDING):  docusate sodium 100 milliGRAM(s) Oral three times a day  enoxaparin Injectable 40 milliGRAM(s) SubCutaneous daily  letrozole 2.5 milliGRAM(s) Oral daily  LORazepam     Tablet 1 milliGRAM(s) Oral two times a day  melatonin 3 milliGRAM(s) Oral at bedtime  QUEtiapine 100 milliGRAM(s) Oral three times a day  senna 2 Tablet(s) Oral at bedtime  sertraline 150 milliGRAM(s) Oral daily  sodium chloride 0.9%. 1000 milliLiter(s) (50 mL/Hr) IV Continuous <Continuous>  traZODone 50 milliGRAM(s) Oral at bedtime    MEDICATIONS  (PRN):    I&O's Summary    04 Oct 2018 07:01  -  05 Oct 2018 07:00  --------------------------------------------------------  IN: 0 mL / OUT: 560 mL / NET: -560 mL        PHYSICAL EXAM:  Vital Signs Last 24 Hrs  T(C): 36.7 (05 Oct 2018 05:26), Max: 37.7 (04 Oct 2018 15:24)  T(F): 98.1 (05 Oct 2018 05:26), Max: 99.8 (04 Oct 2018 15:24)  HR: 74 (05 Oct 2018 05:26) (74 - 95)  BP: 126/72 (05 Oct 2018 05:26) (126/72 - 144/50)  BP(mean): --  RR: 16 (05 Oct 2018 05:26) (15 - 18)  SpO2: 98% (05 Oct 2018 05:26) (97% - 98%)  GENERAL: NAD, well-developed  HEAD:  Atraumatic, Normocephalic  EYES: EOMI, PERRLA, conjunctiva and sclera clear  NECK: Supple, No JVD  CHEST/LUNG: Clear to auscultation bilaterally; No wheeze  HEART: Regular rate and rhythm; No murmurs, rubs, or gallops  ABDOMEN: Soft, Nontender, Nondistended; Bowel sounds present  EXTREMITIES:  2+ Peripheral Pulses, No clubbing, cyanosis, or edema  PSYCH: Alert , Ox 1??  NEUROLOGY: non-focal  SKIN: No rashes or lesions    LABS:                        10.0   6.74  )-----------( 213      ( 05 Oct 2018 06:40 )             29.7     10    136  |  99  |  12  ----------------------------<  106<H>  3.8   |  23  |  0.73    Ca    8.4      05 Oct 2018 06:40    TPro  6.3  /  Alb  3.2<L>  /  TBili  0.4  /  DBili  x   /  AST  29  /  ALT  235<H>  /  AlkPhos  234<H>  10-05    PT/INR - ( 03 Oct 2018 18:50 )   PT: 13.1 SEC;   INR: 1.18          PTT - ( 03 Oct 2018 18:50 )  PTT:54.1 SEC      Urinalysis Basic - ( 03 Oct 2018 20:23 )    Color: LIGHT YELLOW / Appearance: CLEAR / S.015 / pH: 6.5  Gluc: NEGATIVE / Ketone: NEGATIVE  / Bili: NEGATIVE / Urobili: NORMAL   Blood: NEGATIVE / Protein: NEGATIVE / Nitrite: NEGATIVE   Leuk Esterase: MODERATE / RBC: 0-2 / WBC 0-2   Sq Epi: OCC / Non Sq Epi: x / Bacteria: NEGATIVE        RADIOLOGY & ADDITIONAL TESTS:    Imaging Personally Reviewed:    Consultant(s) Notes Reviewed:      Care Discussed with Consultants/Other Providers:

## 2018-10-06 ENCOUNTER — TRANSCRIPTION ENCOUNTER (OUTPATIENT)
Age: 78
End: 2018-10-06

## 2018-10-06 VITALS
RESPIRATION RATE: 18 BRPM | SYSTOLIC BLOOD PRESSURE: 147 MMHG | DIASTOLIC BLOOD PRESSURE: 84 MMHG | TEMPERATURE: 98 F | HEART RATE: 81 BPM | OXYGEN SATURATION: 97 %

## 2018-10-06 LAB
ALBUMIN SERPL ELPH-MCNC: 3.3 G/DL — SIGNIFICANT CHANGE UP (ref 3.3–5)
ALP SERPL-CCNC: 228 U/L — HIGH (ref 40–120)
ALT FLD-CCNC: 178 U/L — HIGH (ref 4–33)
AST SERPL-CCNC: 27 U/L — SIGNIFICANT CHANGE UP (ref 4–32)
BILIRUB DIRECT SERPL-MCNC: 0.1 MG/DL — SIGNIFICANT CHANGE UP (ref 0.1–0.2)
BILIRUB SERPL-MCNC: 0.4 MG/DL — SIGNIFICANT CHANGE UP (ref 0.2–1.2)
BUN SERPL-MCNC: 9 MG/DL — SIGNIFICANT CHANGE UP (ref 7–23)
CALCIUM SERPL-MCNC: 8.5 MG/DL — SIGNIFICANT CHANGE UP (ref 8.4–10.5)
CHLORIDE SERPL-SCNC: 95 MMOL/L — LOW (ref 98–107)
CO2 SERPL-SCNC: 25 MMOL/L — SIGNIFICANT CHANGE UP (ref 22–31)
CREAT SERPL-MCNC: 0.68 MG/DL — SIGNIFICANT CHANGE UP (ref 0.5–1.3)
GLUCOSE SERPL-MCNC: 101 MG/DL — HIGH (ref 70–99)
HCT VFR BLD CALC: 29.4 % — LOW (ref 34.5–45)
HGB BLD-MCNC: 9.9 G/DL — LOW (ref 11.5–15.5)
MCHC RBC-ENTMCNC: 28.8 PG — SIGNIFICANT CHANGE UP (ref 27–34)
MCHC RBC-ENTMCNC: 33.7 % — SIGNIFICANT CHANGE UP (ref 32–36)
MCV RBC AUTO: 85.5 FL — SIGNIFICANT CHANGE UP (ref 80–100)
NRBC # FLD: 0 — SIGNIFICANT CHANGE UP
PLATELET # BLD AUTO: 214 K/UL — SIGNIFICANT CHANGE UP (ref 150–400)
PMV BLD: 9.1 FL — SIGNIFICANT CHANGE UP (ref 7–13)
POTASSIUM SERPL-MCNC: 3.9 MMOL/L — SIGNIFICANT CHANGE UP (ref 3.5–5.3)
POTASSIUM SERPL-SCNC: 3.9 MMOL/L — SIGNIFICANT CHANGE UP (ref 3.5–5.3)
PROT SERPL-MCNC: 6.2 G/DL — SIGNIFICANT CHANGE UP (ref 6–8.3)
RBC # BLD: 3.44 M/UL — LOW (ref 3.8–5.2)
RBC # FLD: 12 % — SIGNIFICANT CHANGE UP (ref 10.3–14.5)
SODIUM SERPL-SCNC: 132 MMOL/L — LOW (ref 135–145)
WBC # BLD: 6.56 K/UL — SIGNIFICANT CHANGE UP (ref 3.8–10.5)
WBC # FLD AUTO: 6.56 K/UL — SIGNIFICANT CHANGE UP (ref 3.8–10.5)

## 2018-10-06 PROCEDURE — 99239 HOSP IP/OBS DSCHRG MGMT >30: CPT

## 2018-10-06 RX ORDER — INFLUENZA VIRUS VACCINE 15; 15; 15; 15 UG/.5ML; UG/.5ML; UG/.5ML; UG/.5ML
0.5 SUSPENSION INTRAMUSCULAR ONCE
Qty: 0 | Refills: 0 | Status: COMPLETED | OUTPATIENT
Start: 2018-10-06 | End: 2018-10-06

## 2018-10-06 RX ORDER — DOCUSATE SODIUM 100 MG
1 CAPSULE ORAL
Qty: 0 | Refills: 0 | DISCHARGE
Start: 2018-10-06

## 2018-10-06 RX ORDER — SENNA PLUS 8.6 MG/1
2 TABLET ORAL
Qty: 0 | Refills: 0 | DISCHARGE
Start: 2018-10-06

## 2018-10-06 RX ADMIN — ENOXAPARIN SODIUM 40 MILLIGRAM(S): 100 INJECTION SUBCUTANEOUS at 12:46

## 2018-10-06 RX ADMIN — QUETIAPINE FUMARATE 100 MILLIGRAM(S): 200 TABLET, FILM COATED ORAL at 13:45

## 2018-10-06 RX ADMIN — Medication 1 MILLIGRAM(S): at 06:02

## 2018-10-06 RX ADMIN — QUETIAPINE FUMARATE 100 MILLIGRAM(S): 200 TABLET, FILM COATED ORAL at 06:02

## 2018-10-06 RX ADMIN — Medication 100 MILLIGRAM(S): at 06:02

## 2018-10-06 RX ADMIN — INFLUENZA VIRUS VACCINE 0.5 MILLILITER(S): 15; 15; 15; 15 SUSPENSION INTRAMUSCULAR at 14:19

## 2018-10-06 RX ADMIN — Medication 100 MILLIGRAM(S): at 13:45

## 2018-10-06 RX ADMIN — SERTRALINE 150 MILLIGRAM(S): 25 TABLET, FILM COATED ORAL at 12:46

## 2018-10-06 NOTE — DISCHARGE NOTE ADULT - MEDICATION SUMMARY - MEDICATIONS TO TAKE
I will START or STAY ON the medications listed below when I get home from the hospital:    LORazepam 1 mg oral tablet  -- 1 tab(s) by mouth 2 times a day  -- Indication: For Anxiety disorder    traZODone 50 mg oral tablet  -- 1 tab(s) by mouth once a day (at bedtime)  -- Indication: For Schizophrenia    sertraline  -- 150 milligram(s) by mouth once a day  -- Indication: For Depressive disorder    letrozole 2.5 mg oral tablet  -- 1 tab(s) by mouth once a day  -- Indication: For Breast cancer    QUEtiapine 100 mg oral tablet  -- 1 tab(s) by mouth 3 times a day  -- Indication: For Schizophrenia    senna oral tablet  -- 2 tab(s) by mouth once a day (at bedtime)  -- Indication: For constipation    docusate sodium 100 mg oral capsule  -- 1 cap(s) by mouth 3 times a day  -- Indication: For constipation    melatonin 3 mg oral tablet  -- 1 tab(s) by mouth once a day (at bedtime)  -- Indication: For Sleep aide

## 2018-10-06 NOTE — DISCHARGE NOTE ADULT - PATIENT PORTAL LINK FT
You can access the [facility] Patient Portal, offered by [facility], by registering with the following website: [email or web address]

## 2018-10-06 NOTE — PROGRESS NOTE ADULT - PROBLEM SELECTOR PLAN 5
continue Psych meds  Psych consult called- FU Psych continue Psych meds  Psych eval:  77yo woman with history of depression and anxiety, recently admitted to [facility] for 1.5 months for depression and SI (discharged 10/3/18) who presented with fever and cough, found to have +enterovirus on RVP and admitted for sepsis, psychiatry consulted for agitation and medication management. Patient seen and evaluated, calm, pleasant, cooperative  and linear. Patient reported feeling good, eating and sleeping well. Denies feeling depressed, denies SI and HI. Denies acute psychotic  sxs. Patient is hopeful and future oriented. Recommend meds. as written above, monitor EKG for qtc, outpt. follow up appointment as above.

## 2018-10-06 NOTE — DOWNTIME INTERRUPTION NOTE - WHICH MANUAL FORMS INITIATED?
Due to SCM downtime     see paper chart for clinical documentation recorded during the downtime.    CPG

## 2018-10-06 NOTE — DISCHARGE NOTE ADULT - HOSPITAL COURSE
78 year old female with a PMH of schizophrenia, anxiety, depression, recently d/c'ed from [facility] today (admitted for 1 month) for SI admitted to the hsopital for agitation, anxiety and "feeling scared" after she arrived  at assisted living facility( [facility])  for 1 day.  Unable to obtain much history from pt  She states that she is in the hospital as she was scared at the facility. She felt lonely and had no friends, so she asked her daughter to bring her to the hospital.   She c/o left sided abdominal pain and suprapubic pain,  about 3-4/10, started yesterday   I called daughter Lea and obtain collateral  As per Lea, her mom  was not feeling  well at the AF both physically and mentally. She had a cold and her face turned red which prompted her to return to the ER.  Daughter also reports mom has chronic urinary issues- urinary frequency and was told she has a uterine prolapse at a previous admission   Denies n/v,dysuria, hematuria, melena, hematochezia, diarrhea.  Pt was recently admitted to [facility] from 8/14-10/3  for depression,SI. At [facility], she received 3 courses of ABx during the month of August: Keflex x 5 days, Bactrim x 5 days and Macrobid    At baseline- pt walks with a walker, needs help with showering and dressing     In the ED, pt with a Tmax of 100.5, HR of 113, SBP of 135/69, RR of 18, O2 sats of 98 % on RA   RVP was positive for enterovirus, pt was given Vanc/Cefepime and Cipro x 1 dose  and was admitted     HOSPITAL COURSE:  She was admitted with sepsis sec to entero- rhinovirus with fever and tachycardia.  Patient was observed and noted to have neg urine culture and blood cult neg x 48hrs and resolved sepsis  She had Ct of A/P10/3  10/5 Renal US- simple cyst.  She was seen by Psychiatry  and determined to be comfortable and cooperative.  She was opt for d/c back to her facility and daughter Lea Stone was informed at [phone]  Sw was able to arrange transfer back to facility on... 78 year old female with a PMH of schizophrenia, anxiety, depression, recently d/c'ed from [facility] today (admitted for 1 month) for SI admitted to the hsopital for agitation, anxiety and "feeling scared" after she arrived  at assisted living facility( [facility])  for 1 day.  Unable to obtain much history from pt  She states that she is in the hospital as she was scared at the facility. She felt lonely and had no friends, so she asked her daughter to bring her to the hospital.   She c/o left sided abdominal pain and suprapubic pain,  about 3-4/10, started yesterday   I called daughter Lea and obtain collateral  As per Lea, her mom  was not feeling  well at the AF both physically and mentally. She had a cold and her face turned red which prompted her to return to the ER.  Daughter also reports mom has chronic urinary issues- urinary frequency and was told she has a uterine prolapse at a previous admission   Denies n/v,dysuria, hematuria, melena, hematochezia, diarrhea.  Pt was recently admitted to [facility] from 8/14-10/3  for depression,SI. At [facility], she received 3 courses of ABx during the month of August: Keflex x 5 days, Bactrim x 5 days and Macrobid    At baseline- pt walks with a walker, needs help with showering and dressing     In the ED, pt with a Tmax of 100.5, HR of 113, SBP of 135/69, RR of 18, O2 sats of 98 % on RA   RVP was positive for enterovirus, pt was given Vanc/Cefepime and Cipro x 1 dose  and was admitted     HOSPITAL COURSE:  She was admitted with sepsis sec to entero- rhinovirus with fever and tachycardia.  Patient was observed and noted to have neg urine culture and blood cult neg x 48hrs and resolved sepsis  She had Ct of A/P10/3  10/5 Renal US- simple cyst.  She was seen by Psychiatry  and determined to be comfortable and cooperative.  She was opt for d/c back to her facility and daughter Lea Stone was informed at [phone]  Sw was able to arrange transfer back to facility on 10/6.

## 2018-10-06 NOTE — DISCHARGE NOTE ADULT - CARE PLAN
Principal Discharge DX:	Fever, unspecified fever cause  Goal:	Resolved  Assessment and plan of treatment:	You were treated for a viral infection while hospitalized.    Fever has resolved.  Monitor for any further signs and symptoms of infection, fever, chills, elevated white count.  Secondary Diagnosis:	Anxiety disorder  Assessment and plan of treatment:	Continue anti anxiety medications as prescribed.  Secondary Diagnosis:	Schizophrenia  Assessment and plan of treatment:	Continue medications as prescribed.  follow up with your psychiatrist within 2 weeks of discharge from hospital for further medical management.  Secondary Diagnosis:	Transaminitis  Assessment and plan of treatment:	repeat liver enzymes within 2 weeks of discharge and follow up with your PCP for further medical management.

## 2018-10-06 NOTE — DISCHARGE NOTE ADULT - PLAN OF CARE
Resolved You were treated for a viral infection while hospitalized.    Fever has resolved.  Monitor for any further signs and symptoms of infection, fever, chills, elevated white count. Continue anti anxiety medications as prescribed. Continue medications as prescribed.  follow up with your psychiatrist within 2 weeks of discharge from hospital for further medical management. repeat liver enzymes within 2 weeks of discharge and follow up with your PCP for further medical management.

## 2018-10-06 NOTE — PROGRESS NOTE ADULT - SUBJECTIVE AND OBJECTIVE BOX
Patient is a 78y old  Female who presents with a chief complaint of Not feeling well x 1 day (06 Oct 2018 09:37)      SUBJECTIVE / OVERNIGHT EVENTS:    MEDICATIONS  (STANDING):  docusate sodium 100 milliGRAM(s) Oral three times a day  enoxaparin Injectable 40 milliGRAM(s) SubCutaneous daily  letrozole 2.5 milliGRAM(s) Oral daily  LORazepam     Tablet 1 milliGRAM(s) Oral two times a day  melatonin 3 milliGRAM(s) Oral at bedtime  QUEtiapine 100 milliGRAM(s) Oral three times a day  senna 2 Tablet(s) Oral at bedtime  sertraline 150 milliGRAM(s) Oral daily  sodium chloride 0.9%. 1000 milliLiter(s) (50 mL/Hr) IV Continuous <Continuous>  traZODone 50 milliGRAM(s) Oral at bedtime    MEDICATIONS  (PRN):      PHYSICAL EXAM:  Vital Signs Last 24 Hrs  T(C): 36.7 (06 Oct 2018 05:59), Max: 37.3 (05 Oct 2018 21:05)  T(F): 98 (06 Oct 2018 05:59), Max: 99.2 (05 Oct 2018 21:05)  HR: 81 (06 Oct 2018 05:59) (74 - 81)  BP: 147/84 (06 Oct 2018 05:59) (121/59 - 147/84)  BP(mean): --  RR: 18 (06 Oct 2018 05:59) (16 - 18)  SpO2: 97% (06 Oct 2018 05:59) (97% - 99%)  GENERAL: NAD, well-developed  HEAD:  Atraumatic, Normocephalic  EYES: EOMI, PERRLA, conjunctiva and sclera clear  NECK: Supple, No JVD  CHEST/LUNG: Clear to auscultation bilaterally; No wheeze  HEART: Regular rate and rhythm; No murmurs, rubs, or gallops  ABDOMEN: Soft, Nontender, Nondistended; Bowel sounds present  EXTREMITIES:  2+ Peripheral Pulses, No clubbing, cyanosis, or edema  PSYCH: Alert  NEUROLOGY: non-focal  SKIN: No rashes or lesions    LABS:                        9.9    6.56  )-----------( 214      ( 06 Oct 2018 07:15 )             29.4     10-06    132<L>  |  95<L>  |  9   ----------------------------<  101<H>  3.9   |  25  |  0.68    Ca    8.5      06 Oct 2018 07:15    TPro  6.3  /  Alb  3.2<L>  /  TBili  0.4  /  DBili  x   /  AST  29  /  ALT  235<H>  /  AlkPhos  234<H>  10-05              RADIOLOGY & ADDITIONAL TESTS:    Imaging Personally Reviewed:    Consultant(s) Notes Reviewed:      Care Discussed with Consultants/Other Providers: Patient is a 78y old  Female who presents with a chief complaint of Not feeling well x 1 day (06 Oct 2018 09:37)      SUBJECTIVE / OVERNIGHT EVENTS:  Patient eating breakfast w/o complaints .    MEDICATIONS  (STANDING):  docusate sodium 100 milliGRAM(s) Oral three times a day  enoxaparin Injectable 40 milliGRAM(s) SubCutaneous daily  letrozole 2.5 milliGRAM(s) Oral daily  LORazepam     Tablet 1 milliGRAM(s) Oral two times a day  melatonin 3 milliGRAM(s) Oral at bedtime  QUEtiapine 100 milliGRAM(s) Oral three times a day  senna 2 Tablet(s) Oral at bedtime  sertraline 150 milliGRAM(s) Oral daily  sodium chloride 0.9%. 1000 milliLiter(s) (50 mL/Hr) IV Continuous <Continuous>  traZODone 50 milliGRAM(s) Oral at bedtime    MEDICATIONS  (PRN):      PHYSICAL EXAM:  Vital Signs Last 24 Hrs  T(C): 36.7 (06 Oct 2018 05:59), Max: 37.3 (05 Oct 2018 21:05)  T(F): 98 (06 Oct 2018 05:59), Max: 99.2 (05 Oct 2018 21:05)  HR: 81 (06 Oct 2018 05:59) (74 - 81)  BP: 147/84 (06 Oct 2018 05:59) (121/59 - 147/84)  BP(mean): --  RR: 18 (06 Oct 2018 05:59) (16 - 18)  SpO2: 97% (06 Oct 2018 05:59) (97% - 99%)  GENERAL: NAD, well-developed  HEAD:  Atraumatic, Normocephalic  EYES: EOMI, PERRLA, conjunctiva and sclera clear  NECK: Supple, No JVD  CHEST/LUNG: Clear to auscultation bilaterally; No wheeze  HEART: Regular rate and rhythm; No murmurs, rubs, or gallops  ABDOMEN: Soft, Nontender, Nondistended; Bowel sounds present  EXTREMITIES:  2+ Peripheral Pulses, No clubbing, cyanosis, or edema  PSYCH: Alert  NEUROLOGY: non-focal  SKIN: No rashes or lesions    LABS:                        9.9    6.56  )-----------( 214      ( 06 Oct 2018 07:15 )             29.4     10-06    132<L>  |  95<L>  |  9   ----------------------------<  101<H>  3.9   |  25  |  0.68    Ca    8.5      06 Oct 2018 07:15    TPro  6.3  /  Alb  3.2<L>  /  TBili  0.4  /  DBili  x   /  AST  29  /  ALT  235<H>  /  AlkPhos  234<H>  10-05              RADIOLOGY & ADDITIONAL TESTS:    Imaging Personally Reviewed:    Consultant(s) Notes Reviewed:      Care Discussed with Consultants/Other Providers:

## 2018-10-06 NOTE — DISCHARGE NOTE ADULT - OTHER SIGNIFICANT FINDINGS
ct of a/p  < from: CT Abdomen and Pelvis w/ IV Cont (10.03.18 @ 23:53) >  IMPRESSION:   1.  Cholelithiasis without sonographic evidence of acute cholecystitis.    A punctate calcification in the duodenum near the expected location of   the ampulla is suspicious for choledocholithiasis and/or recently passed   renal stone.  Common bile duct is upper limits of normal in size.   Follow-up right upper quadrant ultrasound can be performed as clinically   warranted.      2.  The bladder is distended without abnormality.  If the patient is   unable to urinate, a Flor catheter should be placed.  Underlying   genitourinary infection should be excluded based on clinical symptoms and   laboratory values.    3.  The colon is full of stool.  Correlate clinically for constipation.    No bowel obstruction no evidence of fecal impaction.  Minimal colonic   diverticulosis.    4.  Approximately 4.6 x 4.4 cm indeterminant right lower pole renal   lesion.  Ultrasound or MRI can be performed to determine if this is   cystic or solid mass.    5.  Small left adnexal cyst, of doubtful clinical significance. ,    < end of copied text >

## 2018-10-06 NOTE — PROGRESS NOTE ADULT - PROBLEM SELECTOR PLAN 4
CT abdomen of 4.6 x 4.4 cm indeterminant right lower pole renal   Advise Ultrasound or MRI  to determine if this is cystic or solid mass.  Will obtain Renal ultrasound   Also incidental Small left adnexal cyst, of doubtful clinical significance noted on CT abdomen. Dw Daughter Lea - not aware of cyst previously.
CT abdomen of 4.6 x 4.4 cm indeterminant right lower pole renal .  Renal US 10/6 - simple cyst  Also incidental Small left adnexal cyst.

## 2018-10-06 NOTE — PROGRESS NOTE ADULT - PROBLEM SELECTOR PLAN 7
Back to facility
DW Daughter LeaBlaze Lea concerned pt may need a higher level of care on dc  Will obtain PT/SW consult

## 2018-10-06 NOTE — PROGRESS NOTE ADULT - PROBLEM SELECTOR PLAN 1
pt met sepsis criteria on admission with T of 100.5 and HR of 113  Sepsis likely secondary to Enterovirus  UA- negative, prelim chest Xray- no urgent findings .  Blood cultures neg  will continue supportive care- Cough suppressant, fluids  FU BC  Will monitor off Abx for now.However, if pt contines to have fevers or clinically decompensates, low threshold to start ABx RESOLVED SEPSIS  pt met sepsis criteria on admission with T of 100.5 and HR of 113  Sepsis likely secondary to Enterovirus  UA- negative, prelim chest Xray- no urgent findings .  Blood cultures neg x 48 hrs  will continue supportive care- Cough suppressant, fluids  FU BC  Will monitor off Abx for now.However, if pt contines to have fevers or clinically decompensates, low threshold to start ABx

## 2018-10-06 NOTE — PROGRESS NOTE ADULT - ASSESSMENT
78 year old female with a PMHx of ?schizophrenia, anxiety, depression - recently d/c'ed from [facility] 10/4, (admitted for 1 month) for SI brought today in for agitation, anxiety and "feeling scared" once arriving at assisted living facility  EKG - NSR   Found to have Entro- rhinovirus 78 year old female with a PMHx of ?schizophrenia, anxiety, depression - recently d/c'ed from [facility] 10/4, (admitted for 1 month) for SI brought today in for agitation, anxiety and "feeling scared" once arriving at assisted living facility  EKG - NSR   Found to have Entro- rhinovirus with sepsis on admission- now RESOLVED.  No more fevers or tachycardias

## 2018-10-06 NOTE — PROGRESS NOTE ADULT - ATTENDING COMMENTS
Patient optimized to return to facility  . Patient optimized to return to facility  Called daughter Lea Stone at [phone] and updated to hosp course.  Patient op to return to facility.  SW to assist    45 min to coordinate d/c  .

## 2018-10-08 LAB
BACTERIA BLD CULT: SIGNIFICANT CHANGE UP
BACTERIA BLD CULT: SIGNIFICANT CHANGE UP

## 2018-10-15 ENCOUNTER — OUTPATIENT (OUTPATIENT)
Dept: OUTPATIENT SERVICES | Facility: HOSPITAL | Age: 78
LOS: 1 days | Discharge: HOME | End: 2018-10-15
Payer: MEDICARE

## 2018-10-15 PROCEDURE — 90792 PSYCH DIAG EVAL W/MED SRVCS: CPT

## 2018-10-16 DIAGNOSIS — F41.9 ANXIETY DISORDER, UNSPECIFIED: ICD-10-CM

## 2018-10-16 DIAGNOSIS — F09 UNSPECIFIED MENTAL DISORDER DUE TO KNOWN PHYSIOLOGICAL CONDITION: ICD-10-CM

## 2018-10-16 DIAGNOSIS — F25.1 SCHIZOAFFECTIVE DISORDER, DEPRESSIVE TYPE: ICD-10-CM

## 2018-10-23 NOTE — ED ADULT NURSE NOTE - NSFALLRSKASSISTTYPE_ED_ALL_ED
After Visit Summary   10/23/2018    David Vera    MRN: [id number]           Patient Information     Date Of Birth          1963        Visit Information        Provider Department      10/23/2018 2:30 PM Go Jewell MD [facility]        Today's Diagnoses     Myasthenia gravis without exacerbation (H)    -  1       Follow-ups after your visit        Follow-up notes from your care team     Return in about 3 months (around 1/23/2019).      Your next 10 appointments already scheduled     Jan 29, 2019 12:30 PM CST   Return Muscular Dystrophy with Go Jewell MD   [facility] ([facility])    [address]  [address] [place] [id number] [phone]              Who to contact     Please call your clinic at [phone] to:    Ask questions about your health    Make or cancel appointments    Discuss your medicines    Learn about your test results    Speak to your doctor            Additional Information About Your Visit        [employer] Information     [employer] gives you secure access to your electronic health record. If you see a primary care provider, you can also send messages to your care team and make appointments. If you have questions, please call your primary care clinic.  If you do not have a primary care provider, please call [phone] and they will assist you.      [employer] is an electronic gateway that provides easy, online access to your medical records. With [employer], you can request a clinic appointment, read your test results, renew a prescription or communicate with your care team.     To access your existing account, please contact your [facility] or call [phone] for assistance.        Care EveryWhere ID     This is your Care EveryWhere ID. This could be used by other organizations to access your [place] medical records  [id number]        Your Vitals Were     Pulse Respirations BMI  (Body Mass Index)             62 16 22.46 kg/m2          Blood Pressure from Last 3 Encounters:   10/23/18 138/81   07/17/18 141/80   05/08/18 110/64    Weight from Last 3 Encounters:   10/23/18 144 lb 12.8 oz (65.7 kg)   07/17/18 144 lb 3.2 oz (65.4 kg)   05/08/18 142 lb 12.8 oz (64.8 kg)              Today, you had the following     No orders found for display         Today's Medication Changes          These changes are accurate as of 10/23/18  3:17 PM.  If you have any questions, ask your nurse or doctor.               These medicines have changed or have updated prescriptions.        Dose/Directions    pyridostigmine 60 MG tablet   Commonly known as:  MESTINON   This may have changed:    - how much to take  - when to take this   Used for:  Ophthalmoplegia        Dose:  120 mg   Take 2 tablets (120 mg) by mouth 3 times daily   Quantity:  180 tablet   Refills:  4                Primary Care Provider Office Phone # Fax #    Jamarcus Montano [phone]       [facility] [address]  [facility] [address]        Equal Access to Services     [facility] AH: Hadii yaya Posey, waaxda lualexia, qaybta kaalmada cindy, brown agustin . So [facility] [phone].    ATENCIÓN: Si habla español, tiene a salas disposición servicios gratuitos de asistencia lingüística. Llame al [phone].    We comply with applicable federal civil rights laws and [place] laws. We do not discriminate on the basis of race, color, national origin, age, disability, sex, sexual orientation, or gender identity.            Thank you!     Thank you for choosing [facility]UROS[facility]  for your care. Our goal is always to provide you with excellent care. Hearing back from our patients is one way we can continue to improve our services. Please take a few minutes to complete the written survey that you may receive in the mail after your visit with us. Thank you!             Your Updated  Medication List - Protect others around you: Learn how to safely use, store and throw away your medicines at www.disposemymeds.org.          This list is accurate as of 10/23/18  3:17 PM.  Always use your most recent med list.                   Brand Name Dispense Instructions for use Diagnosis    * mycophenolate 500 MG tablet     120 tablet    Take 2 tablets (1,000 mg) by mouth 2 times daily    Myasthenia gravis without exacerbation (H)       * mycophenolate 500 MG tablet    GENERIC EQUIVALENT    120 tablet    TAKE 2 TABLETS BY MOUTH TWICE DAILY    Myasthenia gravis without exacerbation (H)       * predniSONE 10 MG tablet    DELTASONE    120 tablet    Take 1 tablet (10 mg) by mouth 4 times daily    Myasthenia gravis without exacerbation (H)       * predniSONE 10 MG tablet    DELTASONE    120 tablet    Take 4 tablets (40 mg) by mouth daily    Myasthenia gravis with exacerbation (H)       * predniSONE 5 MG tablet    DELTASONE    30 tablet    Take 1 tablet (5 mg) by mouth 2 times daily    Myasthenia gravis without exacerbation (H)       pyridostigmine 60 MG tablet    MESTINON    180 tablet    Take 2 tablets (120 mg) by mouth 3 times daily    Ophthalmoplegia       * Notice:  This list has 5 medication(s) that are the same as other medications prescribed for you. Read the directions carefully, and ask your doctor or other care provider to review them with you.       Toileting/Standing/Walking

## 2018-11-09 PROCEDURE — 99214 OFFICE O/P EST MOD 30 MIN: CPT

## 2018-12-07 PROCEDURE — 99214 OFFICE O/P EST MOD 30 MIN: CPT

## 2019-01-07 PROCEDURE — 99214 OFFICE O/P EST MOD 30 MIN: CPT

## 2019-02-04 PROCEDURE — 99214 OFFICE O/P EST MOD 30 MIN: CPT

## 2019-06-03 PROCEDURE — 99214 OFFICE O/P EST MOD 30 MIN: CPT

## 2019-07-01 PROCEDURE — 99214 OFFICE O/P EST MOD 30 MIN: CPT

## 2019-08-01 RX ORDER — NITROFURANTOIN MACROCRYSTAL 50 MG
1 CAPSULE ORAL
Qty: 0 | Refills: 0 | DISCHARGE
Start: 2019-08-01 | End: 2019-08-07

## 2019-08-05 PROCEDURE — 99213 OFFICE O/P EST LOW 20 MIN: CPT

## 2019-08-19 ENCOUNTER — INPATIENT (INPATIENT)
Facility: HOSPITAL | Age: 79
LOS: 2 days | Discharge: HOME | DRG: 93 | End: 2019-08-22
Attending: EMERGENCY MEDICINE | Admitting: HOSPITALIST
Payer: MEDICARE

## 2019-08-19 VITALS
DIASTOLIC BLOOD PRESSURE: 76 MMHG | HEIGHT: 66 IN | TEMPERATURE: 100 F | WEIGHT: 139.99 LBS | SYSTOLIC BLOOD PRESSURE: 147 MMHG | HEART RATE: 92 BPM | OXYGEN SATURATION: 92 % | RESPIRATION RATE: 19 BRPM

## 2019-08-19 DIAGNOSIS — R33.9 RETENTION OF URINE, UNSPECIFIED: ICD-10-CM

## 2019-08-19 DIAGNOSIS — N39.0 URINARY TRACT INFECTION, SITE NOT SPECIFIED: ICD-10-CM

## 2019-08-19 DIAGNOSIS — R41.82 ALTERED MENTAL STATUS, UNSPECIFIED: ICD-10-CM

## 2019-08-19 PROBLEM — Z00.00 ENCOUNTER FOR PREVENTIVE HEALTH EXAMINATION: Status: ACTIVE | Noted: 2019-08-19

## 2019-08-19 LAB
ALBUMIN SERPL ELPH-MCNC: 3.6 G/DL — SIGNIFICANT CHANGE UP (ref 3.3–5)
ALP SERPL-CCNC: 133 U/L — HIGH (ref 40–120)
ALT FLD-CCNC: 22 U/L — SIGNIFICANT CHANGE UP (ref 12–78)
ANION GAP SERPL CALC-SCNC: 7 MMOL/L — SIGNIFICANT CHANGE UP (ref 5–17)
APPEARANCE UR: CLEAR — SIGNIFICANT CHANGE UP
APTT BLD: 59 SEC — HIGH (ref 27.5–36.3)
AST SERPL-CCNC: 21 U/L — SIGNIFICANT CHANGE UP (ref 15–37)
BASOPHILS # BLD AUTO: 0.04 K/UL — SIGNIFICANT CHANGE UP (ref 0–0.2)
BASOPHILS NFR BLD AUTO: 0.6 % — SIGNIFICANT CHANGE UP (ref 0–2)
BILIRUB SERPL-MCNC: 0.5 MG/DL — SIGNIFICANT CHANGE UP (ref 0.2–1.2)
BILIRUB UR-MCNC: NEGATIVE — SIGNIFICANT CHANGE UP
BUN SERPL-MCNC: 19 MG/DL — SIGNIFICANT CHANGE UP (ref 7–23)
CALCIUM SERPL-MCNC: 8.9 MG/DL — SIGNIFICANT CHANGE UP (ref 8.5–10.1)
CHLORIDE SERPL-SCNC: 103 MMOL/L — SIGNIFICANT CHANGE UP (ref 96–108)
CO2 SERPL-SCNC: 27 MMOL/L — SIGNIFICANT CHANGE UP (ref 22–31)
COLOR SPEC: YELLOW — SIGNIFICANT CHANGE UP
CREAT SERPL-MCNC: 0.88 MG/DL — SIGNIFICANT CHANGE UP (ref 0.5–1.3)
DIFF PNL FLD: ABNORMAL
EOSINOPHIL # BLD AUTO: 0.11 K/UL — SIGNIFICANT CHANGE UP (ref 0–0.5)
EOSINOPHIL NFR BLD AUTO: 1.8 % — SIGNIFICANT CHANGE UP (ref 0–6)
GLUCOSE SERPL-MCNC: 91 MG/DL — SIGNIFICANT CHANGE UP (ref 70–99)
GLUCOSE UR QL: NEGATIVE MG/DL — SIGNIFICANT CHANGE UP
HCT VFR BLD CALC: 34.1 % — LOW (ref 34.5–45)
HGB BLD-MCNC: 11.5 G/DL — SIGNIFICANT CHANGE UP (ref 11.5–15.5)
IMM GRANULOCYTES NFR BLD AUTO: 1 % — SIGNIFICANT CHANGE UP (ref 0–1.5)
INR BLD: 1.24 RATIO — HIGH (ref 0.88–1.16)
KETONES UR-MCNC: NEGATIVE — SIGNIFICANT CHANGE UP
LACTATE SERPL-SCNC: 0.8 MMOL/L — SIGNIFICANT CHANGE UP (ref 0.7–2)
LEUKOCYTE ESTERASE UR-ACNC: ABNORMAL
LYMPHOCYTES # BLD AUTO: 0.9 K/UL — LOW (ref 1–3.3)
LYMPHOCYTES # BLD AUTO: 14.4 % — SIGNIFICANT CHANGE UP (ref 13–44)
MCHC RBC-ENTMCNC: 29.9 PG — SIGNIFICANT CHANGE UP (ref 27–34)
MCHC RBC-ENTMCNC: 33.7 GM/DL — SIGNIFICANT CHANGE UP (ref 32–36)
MCV RBC AUTO: 88.8 FL — SIGNIFICANT CHANGE UP (ref 80–100)
MONOCYTES # BLD AUTO: 0.53 K/UL — SIGNIFICANT CHANGE UP (ref 0–0.9)
MONOCYTES NFR BLD AUTO: 8.5 % — SIGNIFICANT CHANGE UP (ref 2–14)
NEUTROPHILS # BLD AUTO: 4.62 K/UL — SIGNIFICANT CHANGE UP (ref 1.8–7.4)
NEUTROPHILS NFR BLD AUTO: 73.7 % — SIGNIFICANT CHANGE UP (ref 43–77)
NITRITE UR-MCNC: NEGATIVE — SIGNIFICANT CHANGE UP
PH UR: 6 — SIGNIFICANT CHANGE UP (ref 5–8)
PLATELET # BLD AUTO: 182 K/UL — SIGNIFICANT CHANGE UP (ref 150–400)
POTASSIUM SERPL-MCNC: 3.8 MMOL/L — SIGNIFICANT CHANGE UP (ref 3.5–5.3)
POTASSIUM SERPL-SCNC: 3.8 MMOL/L — SIGNIFICANT CHANGE UP (ref 3.5–5.3)
PROT SERPL-MCNC: 7.6 GM/DL — SIGNIFICANT CHANGE UP (ref 6–8.3)
PROT UR-MCNC: NEGATIVE MG/DL — SIGNIFICANT CHANGE UP
PROTHROM AB SERPL-ACNC: 13.8 SEC — HIGH (ref 10–12.9)
RBC # BLD: 3.84 M/UL — SIGNIFICANT CHANGE UP (ref 3.8–5.2)
RBC # FLD: 11.9 % — SIGNIFICANT CHANGE UP (ref 10.3–14.5)
SODIUM SERPL-SCNC: 137 MMOL/L — SIGNIFICANT CHANGE UP (ref 135–145)
SP GR SPEC: 1.01 — SIGNIFICANT CHANGE UP (ref 1.01–1.02)
TSH SERPL-MCNC: 1.55 UU/ML — SIGNIFICANT CHANGE UP (ref 0.34–4.82)
UROBILINOGEN FLD QL: NEGATIVE MG/DL — SIGNIFICANT CHANGE UP
WBC # BLD: 6.26 K/UL — SIGNIFICANT CHANGE UP (ref 3.8–10.5)
WBC # FLD AUTO: 6.26 K/UL — SIGNIFICANT CHANGE UP (ref 3.8–10.5)

## 2019-08-19 PROCEDURE — 97530 THERAPEUTIC ACTIVITIES: CPT | Mod: GP

## 2019-08-19 PROCEDURE — 97116 GAIT TRAINING THERAPY: CPT | Mod: GP

## 2019-08-19 PROCEDURE — 36415 COLL VENOUS BLD VENIPUNCTURE: CPT

## 2019-08-19 PROCEDURE — 80048 BASIC METABOLIC PNL TOTAL CA: CPT

## 2019-08-19 PROCEDURE — 97162 PT EVAL MOD COMPLEX 30 MIN: CPT | Mod: GP

## 2019-08-19 PROCEDURE — 70450 CT HEAD/BRAIN W/O DYE: CPT | Mod: 26

## 2019-08-19 PROCEDURE — 85027 COMPLETE CBC AUTOMATED: CPT

## 2019-08-19 PROCEDURE — 85025 COMPLETE CBC W/AUTO DIFF WBC: CPT

## 2019-08-19 PROCEDURE — 74177 CT ABD & PELVIS W/CONTRAST: CPT | Mod: 26

## 2019-08-19 PROCEDURE — 73562 X-RAY EXAM OF KNEE 3: CPT | Mod: 50

## 2019-08-19 PROCEDURE — 93010 ELECTROCARDIOGRAM REPORT: CPT

## 2019-08-19 PROCEDURE — 71045 X-RAY EXAM CHEST 1 VIEW: CPT | Mod: 26

## 2019-08-19 PROCEDURE — 80053 COMPREHEN METABOLIC PANEL: CPT

## 2019-08-19 RX ORDER — HEPARIN SODIUM 5000 [USP'U]/ML
5000 INJECTION INTRAVENOUS; SUBCUTANEOUS EVERY 8 HOURS
Refills: 0 | Status: DISCONTINUED | OUTPATIENT
Start: 2019-08-19 | End: 2019-08-22

## 2019-08-19 RX ORDER — QUETIAPINE FUMARATE 200 MG/1
125 TABLET, FILM COATED ORAL DAILY
Refills: 0 | Status: DISCONTINUED | OUTPATIENT
Start: 2019-08-19 | End: 2019-08-22

## 2019-08-19 RX ORDER — ONDANSETRON 8 MG/1
4 TABLET, FILM COATED ORAL EVERY 6 HOURS
Refills: 0 | Status: DISCONTINUED | OUTPATIENT
Start: 2019-08-19 | End: 2019-08-22

## 2019-08-19 RX ORDER — SODIUM CHLORIDE 9 MG/ML
1950 INJECTION INTRAMUSCULAR; INTRAVENOUS; SUBCUTANEOUS ONCE
Refills: 0 | Status: COMPLETED | OUTPATIENT
Start: 2019-08-19 | End: 2019-08-19

## 2019-08-19 RX ORDER — SACCHAROMYCES BOULARDII 250 MG
250 POWDER IN PACKET (EA) ORAL
Refills: 0 | Status: DISCONTINUED | OUTPATIENT
Start: 2019-08-19 | End: 2019-08-22

## 2019-08-19 RX ORDER — SENNA PLUS 8.6 MG/1
2 TABLET ORAL AT BEDTIME
Refills: 0 | Status: DISCONTINUED | OUTPATIENT
Start: 2019-08-19 | End: 2019-08-22

## 2019-08-19 RX ORDER — TRAZODONE HCL 50 MG
50 TABLET ORAL AT BEDTIME
Refills: 0 | Status: DISCONTINUED | OUTPATIENT
Start: 2019-08-19 | End: 2019-08-22

## 2019-08-19 RX ORDER — QUETIAPINE FUMARATE 200 MG/1
175 TABLET, FILM COATED ORAL AT BEDTIME
Refills: 0 | Status: DISCONTINUED | OUTPATIENT
Start: 2019-08-19 | End: 2019-08-22

## 2019-08-19 RX ORDER — CEFTRIAXONE 500 MG/1
1000 INJECTION, POWDER, FOR SOLUTION INTRAMUSCULAR; INTRAVENOUS EVERY 24 HOURS
Refills: 0 | Status: DISCONTINUED | OUTPATIENT
Start: 2019-08-19 | End: 2019-08-20

## 2019-08-19 RX ORDER — LETROZOLE 2.5 MG/1
2.5 TABLET, FILM COATED ORAL DAILY
Refills: 0 | Status: DISCONTINUED | OUTPATIENT
Start: 2019-08-19 | End: 2019-08-22

## 2019-08-19 RX ORDER — ACETAMINOPHEN 500 MG
650 TABLET ORAL EVERY 4 HOURS
Refills: 0 | Status: DISCONTINUED | OUTPATIENT
Start: 2019-08-19 | End: 2019-08-22

## 2019-08-19 RX ORDER — CEFTRIAXONE 500 MG/1
1000 INJECTION, POWDER, FOR SOLUTION INTRAMUSCULAR; INTRAVENOUS ONCE
Refills: 0 | Status: COMPLETED | OUTPATIENT
Start: 2019-08-19 | End: 2019-08-19

## 2019-08-19 RX ORDER — SERTRALINE 25 MG/1
1 TABLET, FILM COATED ORAL
Qty: 0 | Refills: 0 | DISCHARGE

## 2019-08-19 RX ORDER — CEFTRIAXONE 500 MG/1
1000 INJECTION, POWDER, FOR SOLUTION INTRAMUSCULAR; INTRAVENOUS ONCE
Refills: 0 | Status: DISCONTINUED | OUTPATIENT
Start: 2019-08-19 | End: 2019-08-19

## 2019-08-19 RX ORDER — LANOLIN ALCOHOL/MO/W.PET/CERES
3 CREAM (GRAM) TOPICAL AT BEDTIME
Refills: 0 | Status: DISCONTINUED | OUTPATIENT
Start: 2019-08-19 | End: 2019-08-22

## 2019-08-19 RX ORDER — SERTRALINE 25 MG/1
150 TABLET, FILM COATED ORAL DAILY
Refills: 0 | Status: DISCONTINUED | OUTPATIENT
Start: 2019-08-19 | End: 2019-08-22

## 2019-08-19 RX ADMIN — CEFTRIAXONE 1000 MILLIGRAM(S): 500 INJECTION, POWDER, FOR SOLUTION INTRAMUSCULAR; INTRAVENOUS at 12:39

## 2019-08-19 RX ADMIN — SENNA PLUS 2 TABLET(S): 8.6 TABLET ORAL at 22:50

## 2019-08-19 RX ADMIN — SODIUM CHLORIDE 1950 MILLILITER(S): 9 INJECTION INTRAMUSCULAR; INTRAVENOUS; SUBCUTANEOUS at 12:10

## 2019-08-19 RX ADMIN — QUETIAPINE FUMARATE 175 MILLIGRAM(S): 200 TABLET, FILM COATED ORAL at 22:49

## 2019-08-19 RX ADMIN — SODIUM CHLORIDE 1950 MILLILITER(S): 9 INJECTION INTRAMUSCULAR; INTRAVENOUS; SUBCUTANEOUS at 13:10

## 2019-08-19 RX ADMIN — Medication 50 MILLIGRAM(S): at 22:51

## 2019-08-19 RX ADMIN — HEPARIN SODIUM 5000 UNIT(S): 5000 INJECTION INTRAVENOUS; SUBCUTANEOUS at 22:51

## 2019-08-19 NOTE — H&P ADULT - ASSESSMENT
78 yo female, who is deaf and hard of hearing, with a h/o schizoaffective disease, admitted for Acute encephalopathy secondary to UTI with failed outpt treatment on macrobid    1) Acute Encephalopathy  due to infectious etiology in setting of failed outpt tx   cont rocephin as per last urine cx, sensitive to PCN/cefalosporins  IVF for gentle hydration  ashraf for strict i/o's as pt retaining urine on admission, >1L after insertion noted  tylenol prn fever/pain  CT head negative for acute pathology however does show chronic b/l frontal lobe changes consistent with patient behavioral changes over past few years as described by pt daughter  Probiotic ordered  heparin and VCD for DVT prophlyaxis    2) Elevated Alk phos  likely reactive in setting of disease  monitor and trend  No acute intrabdominal findings, 3cm gall bladder stone chronic and pt w/o abd pain, n/v    3) Schizoaffective disease  cont medications: ativan bid, seroquel, zoloft, and trazodone  bed alarm ordered as pt daughter states pt will slide off bed (does this at assisted living)    4) Breast Cancer  cont Femara, in remission as per daughter    5) Weakness  lives at assisted living  PT consult placed as pt with progressive weakness and may need to transition to NH

## 2019-08-19 NOTE — ED ADULT NURSE REASSESSMENT NOTE - NS ED NURSE REASSESS COMMENT FT1
pt received from previous RN Esther. AOX2, no s/s of pain noted. vss. no s/s of acute distress noted. pt pending bed placement. will continue to monitor pt received from previous RN Esther. AOX2, no s/s of pain noted. vss. no s/s of acute distress noted. pt pending bed placement. pt with ashraf patent and draining clear bruna urine to bsd. no diarrhea noted today. will continue to monitor

## 2019-08-19 NOTE — ED PROVIDER NOTE - OBJECTIVE STATEMENT
78 y/o female with a PMHx of schizophrenia, breast CA,  presents to ED c/o urinary sx, diarrhea, AMS, lethargy, weakness. Pt was sent from MD office. Pt has a rectal mass and urinary retention. Allergies: Keppra, generic of Zyprexa. 80 y/o female with a PMHx of schizophrenia, breast CA,  presents to ED c/o urinary sx, diarrhea, AMS, lethargy, weakness. 80 y/o female coming in from [facility]. Known hx of schizophrenia, GERD, breast ca s/p distant radiation. Recently treated as outpatient Thursday for UTI on Macrobid presents with AMS. Daughter reports decreased PO intake and weakness over last few days. Reports urinary frequency and diarrhea for past few days. Was unable to urinate Thursday. At baseline forgetful at times generally alert and oriented, hx obtained from daughter at bedside. Daughter also reports working pt up for unknown rectal mass that doesn't have more details.

## 2019-08-19 NOTE — ED PROVIDER NOTE - CLINICAL SUMMARY MEDICAL DECISION MAKING FREE TEXT BOX
79 female present ED for AMS, abd discomfort. Pt being treated for UTI with Macrobid. Significant  suprapubic TTP, bedside US showed greater 700 cc urine in bladder. Concerned for AMS secondary to infection vs metabolic. Will obtain labs ct and reassess. 79 female present ED for AMS, abd discomfort. Pt being treated for UTI with Macrobid. exam significant for  suprapubic TTP, bedside US showed greater 700 cc urine in bladder. Concerned for AMS secondary to infection vs metabolic. Will obtain labs ct  labs and admit

## 2019-08-19 NOTE — ED ADULT TRIAGE NOTE - CHIEF COMPLAINT QUOTE
Pt EDUARDA, sent from MD office for evaluation of AMS, lethargy, weakness, urinary symptoms, diarrhea. Pt's hearing aids removed PTA by daughter per EMS.

## 2019-08-19 NOTE — ED ADULT NURSE REASSESSMENT NOTE - NS ED NURSE REASSESS COMMENT FT1
Patient received from previous nurse. Pt is A&Ox2/3, VSS on RA. Pt is resting comfortably in their bed at this time, denies any pain or discomfort at this time. Flor in place, draining without any complications. Safety and comfort measures in place. Hourly rounding will be done on my time. Will continue to monitor.

## 2019-08-19 NOTE — H&P ADULT - ATTENDING COMMENTS
16 min spent discussing advanced care planning and pt is full code at this time as per daughter. Daughter states she is power of  and will review documents at home however she requests that pt be full code unless she reaches out and states otherwise. Daughter to bring paperwork in AM.

## 2019-08-19 NOTE — ED PROVIDER NOTE - CARE PLAN
Principal Discharge DX:	Altered mental status  Secondary Diagnosis:	Urinary retention  Secondary Diagnosis:	UTI (urinary tract infection)

## 2019-08-19 NOTE — H&P ADULT - HISTORY OF PRESENT ILLNESS
Pt is a 78 yo female with a pmh/o schizophrenia/schizoaffective disease, breast CA treated with radiation and on oral chemo, GERD, UTI, who presents to ED with daughter due to weakness and frequent urination. History taken from daughter via telephone as pt not good historian due to lethargy. Daughter states pt had been being treated at the [facility] with macrobid for UTI after she noticed her mother was c/o urinary sx and needed frequent diaper changes and couldn't leave the bathroom because she was having trouble urinating'. Daughter made appt for urologist however as pt symptomatic and becoming weak and lethargic, pt saw pmd Dr. Elizondo who referred her to ED. Pt originally had that appt scheduled for what she felt was a rectal mass however daughter does not know exactly what 'that was about'.  Pt in ED found to have UTI and urinary retention. On insertion of ashraf, >1L removed of dark pungent urine. 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 Pt over past four years has had significant decline in functioning, used to ambulate with cane due to arthritis and now is in walker. Daughter attributes her sx to the passing of her  which happened shortly before her decline. Daughter states she has dealt with depression chronically and is a 'functioning schizoaffective'. Pt noted to be tearful in ED. Pt denies any c/o at time of exam however was not cooperative with physical and stated she 'just want to get some sleep'

## 2019-08-19 NOTE — ED PROVIDER NOTE - NS_ ATTENDINGSCRIBEDETAILS _ED_A_ED_FT
I, Gunner Martínez MD,  performed the initial face to face bedside interview with this patient regarding history of present illness, review of symptoms and relevant past medical, social and family history.  I completed an independent physical examination.  I was the initial provider who evaluated this patient.  The history, relevant review of systems, past medical and surgical history, medical decision making, and physical examination was documented by the scribe in my presence and I attest to the accuracy of the documentation.

## 2019-08-19 NOTE — ED PROVIDER NOTE - PHYSICAL EXAMINATION
Constitutional: NAD AAOx3  Eyes: PERRLA EOMI  Head: Normocephalic atraumatic  Mouth: MMM  Cardiac: regular rate   Resp: Lungs CTAB  GI: Abd s/nt/nd  Neuro: CN2-12 intact  Skin: No rashes Constitutional: Ill appearing female. Will open eyes when spoken to or sternal rub. Does follow commands and squeeze hands when asked.   Eyes: PERRLA EOMI  Head: Normocephalic atraumatic  Mouth: MMM  Cardiac: regular rate   Resp: Lungs CTAB  GI: Abd s/nt/nd  Neuro: CN2-12 intact  Skin: No rashes

## 2019-08-19 NOTE — ED ADULT NURSE NOTE - OBJECTIVE STATEMENT
Pt sent from MD for increasing AMS, urinary symptoms.  Pt lethargic, arousable to pain, non-verbal. Bladder distended. Daughter also states recent episode of diarrhea.

## 2019-08-19 NOTE — H&P ADULT - NSICDXFAMILYHX_GEN_ALL_CORE_FT
FAMILY HISTORY:  No pertinent family history in first degree relatives, parents , does not know parents medical history

## 2019-08-20 LAB
ALBUMIN SERPL ELPH-MCNC: 3.2 G/DL — LOW (ref 3.3–5)
ALP SERPL-CCNC: 118 U/L — SIGNIFICANT CHANGE UP (ref 40–120)
ALT FLD-CCNC: 17 U/L — SIGNIFICANT CHANGE UP (ref 12–78)
ANION GAP SERPL CALC-SCNC: 8 MMOL/L — SIGNIFICANT CHANGE UP (ref 5–17)
AST SERPL-CCNC: 16 U/L — SIGNIFICANT CHANGE UP (ref 15–37)
BASOPHILS # BLD AUTO: 0.03 K/UL — SIGNIFICANT CHANGE UP (ref 0–0.2)
BASOPHILS NFR BLD AUTO: 0.4 % — SIGNIFICANT CHANGE UP (ref 0–2)
BILIRUB SERPL-MCNC: 0.5 MG/DL — SIGNIFICANT CHANGE UP (ref 0.2–1.2)
BUN SERPL-MCNC: 16 MG/DL — SIGNIFICANT CHANGE UP (ref 7–23)
CALCIUM SERPL-MCNC: 8.7 MG/DL — SIGNIFICANT CHANGE UP (ref 8.5–10.1)
CHLORIDE SERPL-SCNC: 109 MMOL/L — HIGH (ref 96–108)
CO2 SERPL-SCNC: 25 MMOL/L — SIGNIFICANT CHANGE UP (ref 22–31)
CREAT SERPL-MCNC: 0.76 MG/DL — SIGNIFICANT CHANGE UP (ref 0.5–1.3)
CULTURE RESULTS: SIGNIFICANT CHANGE UP
EOSINOPHIL # BLD AUTO: 0.06 K/UL — SIGNIFICANT CHANGE UP (ref 0–0.5)
EOSINOPHIL NFR BLD AUTO: 0.9 % — SIGNIFICANT CHANGE UP (ref 0–6)
GLUCOSE SERPL-MCNC: 89 MG/DL — SIGNIFICANT CHANGE UP (ref 70–99)
HCT VFR BLD CALC: 33.1 % — LOW (ref 34.5–45)
HGB BLD-MCNC: 11.2 G/DL — LOW (ref 11.5–15.5)
IMM GRANULOCYTES NFR BLD AUTO: 0.6 % — SIGNIFICANT CHANGE UP (ref 0–1.5)
LYMPHOCYTES # BLD AUTO: 0.81 K/UL — LOW (ref 1–3.3)
LYMPHOCYTES # BLD AUTO: 12.1 % — LOW (ref 13–44)
MCHC RBC-ENTMCNC: 29.9 PG — SIGNIFICANT CHANGE UP (ref 27–34)
MCHC RBC-ENTMCNC: 33.8 GM/DL — SIGNIFICANT CHANGE UP (ref 32–36)
MCV RBC AUTO: 88.5 FL — SIGNIFICANT CHANGE UP (ref 80–100)
MONOCYTES # BLD AUTO: 0.59 K/UL — SIGNIFICANT CHANGE UP (ref 0–0.9)
MONOCYTES NFR BLD AUTO: 8.8 % — SIGNIFICANT CHANGE UP (ref 2–14)
NEUTROPHILS # BLD AUTO: 5.17 K/UL — SIGNIFICANT CHANGE UP (ref 1.8–7.4)
NEUTROPHILS NFR BLD AUTO: 77.2 % — HIGH (ref 43–77)
PLATELET # BLD AUTO: 168 K/UL — SIGNIFICANT CHANGE UP (ref 150–400)
POTASSIUM SERPL-MCNC: 3.5 MMOL/L — SIGNIFICANT CHANGE UP (ref 3.5–5.3)
POTASSIUM SERPL-SCNC: 3.5 MMOL/L — SIGNIFICANT CHANGE UP (ref 3.5–5.3)
PROT SERPL-MCNC: 6.8 GM/DL — SIGNIFICANT CHANGE UP (ref 6–8.3)
RBC # BLD: 3.74 M/UL — LOW (ref 3.8–5.2)
RBC # FLD: 12 % — SIGNIFICANT CHANGE UP (ref 10.3–14.5)
SODIUM SERPL-SCNC: 142 MMOL/L — SIGNIFICANT CHANGE UP (ref 135–145)
SPECIMEN SOURCE: SIGNIFICANT CHANGE UP
WBC # BLD: 6.7 K/UL — SIGNIFICANT CHANGE UP (ref 3.8–10.5)
WBC # FLD AUTO: 6.7 K/UL — SIGNIFICANT CHANGE UP (ref 3.8–10.5)

## 2019-08-20 RX ORDER — SODIUM CHLORIDE 9 MG/ML
1000 INJECTION INTRAMUSCULAR; INTRAVENOUS; SUBCUTANEOUS
Refills: 0 | Status: DISCONTINUED | OUTPATIENT
Start: 2019-08-20 | End: 2019-08-21

## 2019-08-20 RX ORDER — LIDOCAINE 4 G/100G
2 CREAM TOPICAL DAILY
Refills: 0 | Status: DISCONTINUED | OUTPATIENT
Start: 2019-08-20 | End: 2019-08-22

## 2019-08-20 RX ADMIN — Medication 1 MILLIGRAM(S): at 06:26

## 2019-08-20 RX ADMIN — SERTRALINE 150 MILLIGRAM(S): 25 TABLET, FILM COATED ORAL at 14:54

## 2019-08-20 RX ADMIN — Medication 650 MILLIGRAM(S): at 06:26

## 2019-08-20 RX ADMIN — Medication 1 MILLIGRAM(S): at 18:13

## 2019-08-20 RX ADMIN — LETROZOLE 2.5 MILLIGRAM(S): 2.5 TABLET, FILM COATED ORAL at 14:52

## 2019-08-20 RX ADMIN — Medication 250 MILLIGRAM(S): at 18:13

## 2019-08-20 RX ADMIN — QUETIAPINE FUMARATE 125 MILLIGRAM(S): 200 TABLET, FILM COATED ORAL at 14:53

## 2019-08-20 RX ADMIN — HEPARIN SODIUM 5000 UNIT(S): 5000 INJECTION INTRAVENOUS; SUBCUTANEOUS at 21:23

## 2019-08-20 RX ADMIN — HEPARIN SODIUM 5000 UNIT(S): 5000 INJECTION INTRAVENOUS; SUBCUTANEOUS at 06:26

## 2019-08-20 RX ADMIN — Medication 50 MILLIGRAM(S): at 21:23

## 2019-08-20 RX ADMIN — Medication 650 MILLIGRAM(S): at 21:22

## 2019-08-20 RX ADMIN — QUETIAPINE FUMARATE 175 MILLIGRAM(S): 200 TABLET, FILM COATED ORAL at 21:22

## 2019-08-20 RX ADMIN — SODIUM CHLORIDE 75 MILLILITER(S): 9 INJECTION INTRAMUSCULAR; INTRAVENOUS; SUBCUTANEOUS at 18:12

## 2019-08-20 RX ADMIN — CEFTRIAXONE 1000 MILLIGRAM(S): 500 INJECTION, POWDER, FOR SOLUTION INTRAMUSCULAR; INTRAVENOUS at 13:00

## 2019-08-20 RX ADMIN — Medication 250 MILLIGRAM(S): at 06:28

## 2019-08-20 RX ADMIN — HEPARIN SODIUM 5000 UNIT(S): 5000 INJECTION INTRAVENOUS; SUBCUTANEOUS at 14:59

## 2019-08-20 RX ADMIN — Medication 650 MILLIGRAM(S): at 14:51

## 2019-08-20 RX ADMIN — LIDOCAINE 2 PATCH: 4 CREAM TOPICAL at 18:11

## 2019-08-20 RX ADMIN — SENNA PLUS 2 TABLET(S): 8.6 TABLET ORAL at 21:23

## 2019-08-20 RX ADMIN — LIDOCAINE 2 PATCH: 4 CREAM TOPICAL at 19:50

## 2019-08-20 NOTE — PHYSICAL THERAPY INITIAL EVALUATION ADULT - ADDITIONAL COMMENTS
Pt over past four years has had significant decline in functioning, used to ambulate with cane due to arthritis and now is in walker. Daughter attributes her sx to the passing of her  which happened shortly before her decline. Daughter states she has dealt with depression chronically and is a 'functioning schizoaffective'

## 2019-08-20 NOTE — PROGRESS NOTE ADULT - ASSESSMENT
80 yo female, who is deaf and hard of hearing, with a h/o schizoaffective disease, admitted for Acute encephalopathy secondary to UTI with failed outpt treatment on macrobid  PCP Dr. Elizondo      * Acute Encephalopathy  multifactorial   * Urinary retention s/p Flor   * Pyuria suspected UTI  but urine cultures negative  * Hematuria  Right kidney cyst 3.3x4 cm   * Cholelithiasis large 3 cm   * Left ovarian cyst 1.8 cm   - c/w Rocephin  - IV fluids  - s/p Flor   >1L after  retention  - tylenol prn fever/pain  - CT head -  mild cerebrovascular disease, global atrophy   - CT abd  - noted  - Probiotic ordered  - psychiatry consult - to minimized psychiatric medication which can cause urinary retention   - urology consult  for hematuria and renal cyst     * Elevated Alk phos, resolved   No acute intrabdominal findings, 3cm gall bladder stone chronic and pt w/o abd pain, n/v    *  Schizoaffective disease   * Depression and anxiety   cont medications: ativan bid, seroquel 125 am 175 pm , zoloft 150 , and trazodone 50 , ativan 1 bid  psychiatry consult to minimize the meds that can cause urinary retention ( SSRI, antipsychotics )     * Breast Cancer  cont Femara, in remission as per daughter    * Generalized  Weakness  * Bilateral Knee pain   lives at assisted living  PT consult placed as pt with progressive weakness and may need to transition to NN  -x ray  knees   - pain management , Lidoderm      * Hearing loss  - does not have hearing aids     Advance directives   -d/w  daughter  HCP  over the phone

## 2019-08-20 NOTE — PHYSICAL THERAPY INITIAL EVALUATION ADULT - PERTINENT HX OF CURRENT PROBLEM, REHAB EVAL
Pt presents to ED with daughter due to weakness and frequent urination. History taken from daughter via telephone as pt not good historian due to lethargy., pmh/o schizophrenia/schizoaffective disease, c/o urinary sx and needed frequent diaper changes and couldn't leave the bathroom because she was having trouble urinating', Pt in ED found to have UTI and urinary retention. On insertion of ashraf, >1L removal of dark pungent urine.,

## 2019-08-20 NOTE — PROGRESS NOTE ADULT - SUBJECTIVE AND OBJECTIVE BOX
Subjective:  Patient is a 79y old  Female who presents with a chief complaint of Acute encephalopathy       HPI:        78 yo female with a pmh/o schizophrenia/schizoaffective disease, breast CA treated with radiation and on oral chemo, GERD, UTI, who presents to ED on 19  with daughter due to weakness and frequent urination. History taken from daughter via telephone as pt not good historian due to lethargy. Daughter states pt had been being treated at the [facility] with macrobid for UTI after she noticed her mother was c/o urinary sx and needed frequent diaper changes and couldn't leave the bathroom because she was having trouble urinating'. Daughter made appt for urologist however as pt symptomatic and becoming weak and lethargic, pt saw pmd Dr. Elizondo who referred her to ED. Pt originally had that appt scheduled for what she felt was a rectal mass however daughter does not know exactly what 'that was about'.  Pt in ED found to have UTI and urinary retention. On insertion of ashraf, >1L removed of dark pungent urine. 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 Pt over past four years has had significant decline in functioning, used to ambulate with cane due to arthritis and now is in walker. Daughter attributes her sx to the passing of her  which happened shortly before her decline. Daughter states she has dealt with depression chronically and is a 'functioning schizoaffective'. Pt noted to be tearful in ED. Pt denies any c/o at time of exam however was not cooperative with physical and stated she 'just want to get some sleep'       - Patient seen and examined at bedside earlier today, lethargic in am, reports bilateral knee pain, poor historian, denies cp, dyspnea, abdominal pain, headache    Review of system- Rest of the review of system are negative except mentioned in HPI    OBJECTIVE:   T(C): 37.8 (19 @ 11:30), Max: 37.8 (19 @ 11:30)  HR: 106 (19 @ 11:30) (85 - 106)  BP: 149/67 (19 @ 11:30) (134/61 - 159/71)  RR: 16 (19 @ 11:30) (16 - 17)  SpO2: 93% (19 @ 11:30) (93% - 96%)  Wt(kg): --  Daily     Daily     PHYSICAL EXAM:  GENERAL: NAD  NERVOUS SYSTEM:  Alert & Oriented X3, non- focal exam,   HEAD:  Atraumatic, Normocephalic  EYES: EOMI, PERRLA, conjunctiva and sclera clear  HEENT: Moist mucous membranes  NECK: Supple, No JVD  CHEST/LUNG: Clear to auscultation bilaterally; No rales, no rhonchi, no wheezing, or rubs  HEART: Regular rate and rhythm; No murmurs, rubs, or gallops  ABDOMEN: Soft, Nontender, Nondistended; Bowel sounds present  GENITOURINARY- Voiding, no suprapubic tenderness  EXTREMITIES:  2+ Peripheral Pulses, No clubbing, cyanosis, or edema  MUSCULOSKELETAL:- No muscle tenderness, Muscle tone normal, No joint tenderness, no Joint swelling, Joint range of motion-normal  SKIN-no rash, no lesion    LABS:                        11.2   6.70  )-----------( 168      ( 20 Aug 2019 06:43 )             33.1     08-    142  |  109<H>  |  16  ----------------------------<  89  3.5   |  25  |  0.76    Ca    8.7      20 Aug 2019 06:43    TPro  6.8  /  Alb  3.2<L>  /  TBili  0.5  /  DBili  x   /  AST  16  /  ALT  17  /  AlkPhos  118      PT/INR - ( 19 Aug 2019 12:10 )   PT: 13.8 sec;   INR: 1.24 ratio         PTT - ( 19 Aug 2019 12:10 )  PTT:59.0 sec      Urinalysis Basic - ( 19 Aug 2019 12:10 )    Color: Yellow / Appearance: Clear / S.010 / pH: x  Gluc: x / Ketone: Negative  / Bili: Negative / Urobili: Negative mg/dL   Blood: x / Protein: Negative mg/dL / Nitrite: Negative   Leuk Esterase: Moderate / RBC: 6-10 /HPF / WBC >50   Sq Epi: x / Non Sq Epi: Few / Bacteria: Few    < from: 12 Lead ECG (19 @ 12:38) >  Ventricular Rate 84 BPM    Atrial Rate 84 BPM    P-R Interval 146 ms    QRS Duration 82 ms    Q-T Interval 360 ms    QTC Calculation(Bezet) 425 ms    P Axis 63 degrees    R Axis 23 degrees    T Axis 20 degrees    Diagnosis Line Normal sinus rhythm  Normal ECG  When compared with ECG of 2018 13:38,  No significant change was found    < end of copied text >      CAPILLARY BLOOD GLUCOSE    RECENT CULTURES:  Culture - Urine (19 @ 12:10)    Specimen Source: .Urine Clean Catch (Midstream)    Culture Results:   <10,000 CFU/mL Normal Urogenital Dione    Culture - Blood (19 @ 12:10)    Specimen Source: .Blood None    Culture Results:   No growth to date.      RADIOLOGY & ADDITIONAL TESTS:  < from: CT Abdomen and Pelvis w/ IV Cont (19 @ 13:52) >  LOWER CHEST: There is posterior dependent and subsegmental atelectasis.   There are no pleural or pericardial effusions. The heart size is normal.    LIVER: Within normal limits.  BILE DUCTS: Normal caliber.  GALLBLADDER: Large 3 x 2 cm calcified gallstone is again noted within the   gallbladder lumen.   SPLEEN: Within normal limits.  PANCREAS: Within normal limits.  ADRENALS: Within normal limits.  KIDNEYS/URETERS: No renal stones or hydronephrosis. Symmetric   enhancement. Again noted is a 3.3 x 4 cm cyst at the lower pole of the   right kidney.    BLADDER: Partially decompressed around a Ashraf catheter.  REPRODUCTIVE ORGANS: The uterus is present. Stable 1.8 cm left ovarian   cyst.    BOWEL: No bowel obstruction. Appendix is within normal limits.  PERITONEUM: No ascites.  VESSELS: The aorta and IVC are normal in caliber and patent.  RETROPERITONEUM/LYMPH NODES: No lymphadenopathy.    ABDOMINAL WALL: Within normal limits.  BONES: Degenerative change of the thoracolumbar spine.    IMPRESSION:     Large 3 cm gallstone within the gallbladder again noted. If gallbladder   pathology is clinically suspected, further assessment with ultrasound is   recommended.    Other incidental findings are as above.      < end of copied text >  < from: CT Head No Cont (19 @ 13:29) >    The brain demonstrates mild periventricular white matter ischemia.   No   acute cerebral cortical infarct is seen.  No intracranial hemorrhage is   found.  No mass effect is found in the brain.      The ventricles, sulci and basal cisterns show global atrophy most   prominent in the frontal and temporal lobes.    The orbits are unremarkable.  The paranasal sinuses are clear.  The nasal   cavity appears intact.  The nasopharynx is symmetric.  The central skull   base, petrous temporal bones and calvarium remain intact.      IMPRESSION:   Mild periventricular white matter ischemia. Global atrophy   most prominent in the BILATERAL frontal and temporal lobes.      < end of copied text >    Current medications:  acetaminophen   Tablet .. 650 milliGRAM(s) Oral every 4 hours PRN  cefTRIAXone Injectable. 1000 milliGRAM(s) IV Push every 24 hours  heparin  Injectable 5000 Unit(s) SubCutaneous every 8 hours  letrozole 2.5 milliGRAM(s) Oral daily  lidocaine   Patch 2 Patch Transdermal daily  LORazepam     Tablet 1 milliGRAM(s) Oral two times a day  melatonin 3 milliGRAM(s) Oral at bedtime PRN  ondansetron Injectable 4 milliGRAM(s) IV Push every 6 hours PRN  QUEtiapine 175 milliGRAM(s) Oral at bedtime  QUEtiapine 125 milliGRAM(s) Oral daily  saccharomyces boulardii 250 milliGRAM(s) Oral two times a day  senna 2 Tablet(s) Oral at bedtime  sertraline 150 milliGRAM(s) Oral daily  sodium chloride 0.9%. 1000 milliLiter(s) IV Continuous <Continuous>  traZODone 50 milliGRAM(s) Oral at bedtime

## 2019-08-21 PROCEDURE — 73562 X-RAY EXAM OF KNEE 3: CPT | Mod: 26,50

## 2019-08-21 RX ADMIN — LETROZOLE 2.5 MILLIGRAM(S): 2.5 TABLET, FILM COATED ORAL at 11:25

## 2019-08-21 RX ADMIN — LIDOCAINE 2 PATCH: 4 CREAM TOPICAL at 23:47

## 2019-08-21 RX ADMIN — Medication 250 MILLIGRAM(S): at 05:05

## 2019-08-21 RX ADMIN — Medication 250 MILLIGRAM(S): at 17:20

## 2019-08-21 RX ADMIN — SENNA PLUS 2 TABLET(S): 8.6 TABLET ORAL at 21:42

## 2019-08-21 RX ADMIN — QUETIAPINE FUMARATE 175 MILLIGRAM(S): 200 TABLET, FILM COATED ORAL at 21:42

## 2019-08-21 RX ADMIN — SERTRALINE 150 MILLIGRAM(S): 25 TABLET, FILM COATED ORAL at 11:25

## 2019-08-21 RX ADMIN — LIDOCAINE 2 PATCH: 4 CREAM TOPICAL at 20:19

## 2019-08-21 RX ADMIN — HEPARIN SODIUM 5000 UNIT(S): 5000 INJECTION INTRAVENOUS; SUBCUTANEOUS at 21:41

## 2019-08-21 RX ADMIN — Medication 50 MILLIGRAM(S): at 21:42

## 2019-08-21 RX ADMIN — LIDOCAINE 2 PATCH: 4 CREAM TOPICAL at 06:18

## 2019-08-21 RX ADMIN — Medication 1 MILLIGRAM(S): at 05:05

## 2019-08-21 RX ADMIN — HEPARIN SODIUM 5000 UNIT(S): 5000 INJECTION INTRAVENOUS; SUBCUTANEOUS at 05:04

## 2019-08-21 RX ADMIN — Medication 1 MILLIGRAM(S): at 17:33

## 2019-08-21 RX ADMIN — LIDOCAINE 2 PATCH: 4 CREAM TOPICAL at 11:25

## 2019-08-21 RX ADMIN — HEPARIN SODIUM 5000 UNIT(S): 5000 INJECTION INTRAVENOUS; SUBCUTANEOUS at 13:06

## 2019-08-21 RX ADMIN — QUETIAPINE FUMARATE 125 MILLIGRAM(S): 200 TABLET, FILM COATED ORAL at 11:25

## 2019-08-21 NOTE — PROGRESS NOTE ADULT - SUBJECTIVE AND OBJECTIVE BOX
Subjective:  Patient is a 79y old  Female who presents with a chief complaint of Acute encephalopathy       HPI:        80 yo female with a pmh/o schizophrenia/schizoaffective disease, breast CA treated with radiation and on oral chemo, GERD, UTI, who presents to ED on 19  with daughter due to weakness and frequent urination. History taken from daughter via telephone as pt not good historian due to lethargy. Daughter states pt had been being treated at the [facility] with macrobid for UTI after she noticed her mother was c/o urinary sx and needed frequent diaper changes and couldn't leave the bathroom because she was having trouble urinating'. Daughter made appt for urologist however as pt symptomatic and becoming weak and lethargic, pt saw pmd Dr. Elizondo who referred her to ED. Pt originally had that appt scheduled for what she felt was a rectal mass however daughter does not know exactly what 'that was about'.  Pt in ED found to have UTI and urinary retention. On insertion of ashraf, >1L removed of dark pungent urine. 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 Pt over past four years has had significant decline in functioning, used to ambulate with cane due to arthritis and now is in walker. Daughter attributes her sx to the passing of her  which happened shortly before her decline. Daughter states she has dealt with depression chronically and is a 'functioning schizoaffective'. Pt noted to be tearful in ED. Pt denies any c/o at time of exam however was not cooperative with physical and stated she 'just want to get some sleep'       - Patient seen and examined at bedside earlier today, lethargic in am, reports bilateral knee pain, poor historian, denies cp, dyspnea, abdominal pain, headache   - pt seen and examined, less confused today, OOB in the chair, denies pain, cp, palpitations, afebrile   Review of system- Rest of the review of system are negative except mentioned in HPI    OBJECTIVE:   TT(C): 37.4 (19 @ 12:34), Max: 38 (19 @ 20:35)  T(F): 99.3 (19 @ 12:34), Max: 100.4 (19 @ 20:35)  HR: 102 (19 @ 12:34) (83 - 102)  BP: 113/69 (19 @ 12:34) (113/69 - 128/60)  RR: 16 (19 @ 12:34) (16 - 17)  SpO2: 95% (19 @ 12:34) (93% - 95%)  Wt(kg): --  PHYSICAL EXAM:  GENERAL: NAD  NERVOUS SYSTEM:  Alert & Oriented X3, non- focal exam,   HEAD:  Atraumatic, Normocephalic  EYES: EOMI, PERRLA, conjunctiva and sclera clear  HEENT: Moist mucous membranes  NECK: Supple, No JVD  CHEST/LUNG: Clear to auscultation bilaterally; No rales, no rhonchi, no wheezing, or rubs  HEART: Regular rate and rhythm; No murmurs, rubs, or gallops  ABDOMEN: Soft, Nontender, Nondistended; Bowel sounds present  GENITOURINARY- Voiding, no suprapubic tenderness  EXTREMITIES:  2+ Peripheral Pulses, No clubbing, cyanosis, or edema  MUSCULOSKELETAL:- No muscle tenderness, Muscle tone normal, No joint tenderness, no Joint swelling, Joint range of motion-normal  SKIN-no rash, no lesion    LABS:                                11.2   6.70  )-----------( 168      ( 20 Aug 2019 06:43 )             33.1     08-    142  |  109<H>  |  16  ----------------------------<  89  3.5   |  25  |  0.76    Ca    8.7      20 Aug 2019 06:43    TPro  6.8  /  Alb  3.2<L>  /  TBili  0.5  /  DBili  x   /  AST  16  /  ALT  17  /  AlkPhos  118  08-20    PT/INR - ( 19 Aug 2019 12:10 )   PT: 13.8 sec;   INR: 1.24 ratio         PTT - ( 19 Aug 2019 12:10 )  PTT:59.0 sec      Urinalysis Basic - ( 19 Aug 2019 12:10 )    Color: Yellow / Appearance: Clear / S.010 / pH: x  Gluc: x / Ketone: Negative  / Bili: Negative / Urobili: Negative mg/dL   Blood: x / Protein: Negative mg/dL / Nitrite: Negative   Leuk Esterase: Moderate / RBC: 6-10 /HPF / WBC >50   Sq Epi: x / Non Sq Epi: Few / Bacteria: Few    < from: 12 Lead ECG (19 @ 12:38) >  Ventricular Rate 84 BPM    Atrial Rate 84 BPM    P-R Interval 146 ms    QRS Duration 82 ms    Q-T Interval 360 ms    QTC Calculation(Bezet) 425 ms    P Axis 63 degrees    R Axis 23 degrees    T Axis 20 degrees    Diagnosis Line Normal sinus rhythm  Normal ECG  When compared with ECG of 2018 13:38,  No significant change was found    < end of copied text >      CAPILLARY BLOOD GLUCOSE    RECENT CULTURES:  Culture - Urine (19 @ 12:10)    Specimen Source: .Urine Clean Catch (Midstream)    Culture Results:   <10,000 CFU/mL Normal Urogenital Dione    Culture - Blood (19 @ 12:10)    Specimen Source: .Blood None    Culture Results:   No growth to date.      RADIOLOGY & ADDITIONAL TESTS:  < from: Xray Knee 3 Views, Bilateral (19 @ 09:05) >     Three views of the right knee and three views of the left knee show no   evidence of fracture nor destructive change. The joint spaces show mild   narrowing of both medial compartments and both patellofemoral spaces.   Hypertrophic spurring is seen on either side of both knee joints.    IMPRESSION: No acute bony pathology.    Note - This does not exclude fractures in perfect alignment and   apposition as presence may be indicated at one to three weeks following   callus formation.    Thank you for this referral.      < end of copied text >    < from: CT Abdomen and Pelvis w/ IV Cont (19 @ 13:52) >  LOWER CHEST: There is posterior dependent and subsegmental atelectasis.   There are no pleural or pericardial effusions. The heart size is normal.    LIVER: Within normal limits.  BILE DUCTS: Normal caliber.  GALLBLADDER: Large 3 x 2 cm calcified gallstone is again noted within the   gallbladder lumen.   SPLEEN: Within normal limits.  PANCREAS: Within normal limits.  ADRENALS: Within normal limits.  KIDNEYS/URETERS: No renal stones or hydronephrosis. Symmetric   enhancement. Again noted is a 3.3 x 4 cm cyst at the lower pole of the   right kidney.    BLADDER: Partially decompressed around a Ashraf catheter.  REPRODUCTIVE ORGANS: The uterus is present. Stable 1.8 cm left ovarian   cyst.    BOWEL: No bowel obstruction. Appendix is within normal limits.  PERITONEUM: No ascites.  VESSELS: The aorta and IVC are normal in caliber and patent.  RETROPERITONEUM/LYMPH NODES: No lymphadenopathy.    ABDOMINAL WALL: Within normal limits.  BONES: Degenerative change of the thoracolumbar spine.    IMPRESSION:     Large 3 cm gallstone within the gallbladder again noted. If gallbladder   pathology is clinically suspected, further assessment with ultrasound is   recommended.    Other incidental findings are as above.      < end of copied text >  < from: CT Head No Cont (19 @ 13:29) >    The brain demonstrates mild periventricular white matter ischemia.   No   acute cerebral cortical infarct is seen.  No intracranial hemorrhage is   found.  No mass effect is found in the brain.      The ventricles, sulci and basal cisterns show global atrophy most   prominent in the frontal and temporal lobes.    The orbits are unremarkable.  The paranasal sinuses are clear.  The nasal   cavity appears intact.  The nasopharynx is symmetric.  The central skull   base, petrous temporal bones and calvarium remain intact.      IMPRESSION:   Mild periventricular white matter ischemia. Global atrophy   most prominent in the BILATERAL frontal and temporal lobes.      < end of copied text >    Current medications:  acetaminophen   Tablet .. 650 milliGRAM(s) Oral every 4 hours PRN  cefTRIAXone Injectable. 1000 milliGRAM(s) IV Push every 24 hours  heparin  Injectable 5000 Unit(s) SubCutaneous every 8 hours  letrozole 2.5 milliGRAM(s) Oral daily  lidocaine   Patch 2 Patch Transdermal daily  LORazepam     Tablet 1 milliGRAM(s) Oral two times a day  melatonin 3 milliGRAM(s) Oral at bedtime PRN  ondansetron Injectable 4 milliGRAM(s) IV Push every 6 hours PRN  QUEtiapine 175 milliGRAM(s) Oral at bedtime  QUEtiapine 125 milliGRAM(s) Oral daily  saccharomyces boulardii 250 milliGRAM(s) Oral two times a day  senna 2 Tablet(s) Oral at bedtime  sertraline 150 milliGRAM(s) Oral daily  sodium chloride 0.9%. 1000 milliLiter(s) IV Continuous <Continuous>  traZODone 50 milliGRAM(s) Oral at bedtime

## 2019-08-21 NOTE — PHYSICAL THERAPY INITIAL EVALUATION ADULT - ACTIVE RANGE OF MOTION EXAMINATION, REHAB EVAL
+mild hamstring tightness,seated knee EXT just short of full ,FLEX B knees > 100degrees/ashley. upper extremity Active ROM was WNL (within normal limits)/bilateral  lower extremity Active ROM was WFL (within functional limits)

## 2019-08-21 NOTE — PHYSICAL THERAPY INITIAL EVALUATION ADULT - PLANNED THERAPY INTERVENTIONS, PT EVAL
postural re-education/ROM/bed mobility training/transfer training/stretching/strengthening/gait training/balance training

## 2019-08-21 NOTE — PHYSICAL THERAPY INITIAL EVALUATION ADULT - PRECAUTIONS/LIMITATIONS, REHAB EVAL
hearing precautions/fall precautions
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

## 2019-08-21 NOTE — PROGRESS NOTE ADULT - ASSESSMENT
78 yo female, who is deaf and hard of hearing, with a h/o schizoaffective disease, admitted for Acute encephalopathy secondary to UTI with failed outpt treatment on macrobid  PCP Dr. Elizondo      * Acute Toxic  Encephalopathy  due to adverse drug effect - antipsychotic, benzo, SSRI    * Urinary retention s/p Flor   * Pyuria , doubt UTI  due to negative  urine cultures  * Hematuria  Right kidney cyst 3.3x4 cm   * Cholelithiasis large 3 cm   * Left ovarian cyst 1.8 cm   - c/w Rocephin  - IV fluids  - s/p Flor   >1L after  retention  - tylenol prn fever/pain  - CT head -  mild cerebrovascular disease, global atrophy   - CT abd  - noted  - Probiotic ordered  - psychiatry consult - to minimized psychiatric medication which can cause urinary retention   - urology consult  for hematuria and renal cyst     * Elevated Alk phos, resolved   No acute intrabdominal findings, 3cm gall bladder stone chronic and pt w/o abd pain, n/v    *  Schizoaffective disease   * Depression and anxiety   cont medications: ativan bid, seroquel 125 am 175 pm , zoloft 150 , and trazodone 50 , ativan 1 bid  psychiatry consult to minimize the meds that can cause urinary retention ( SSRI, antipsychotics )     * Breast Cancer  cont Femara, in remission as per daughter    * Generalized  Weakness  * Bilateral Knee pain   lives at assisted living  PT consult placed as pt with progressive weakness and may need to transition to NN - may return to AL   -x ray  knees   - pain management , Lidoderm      * Hearing loss  - does not have hearing aids     Advance directives   -d/w  daughter  HCP  over the phone

## 2019-08-21 NOTE — PHYSICAL THERAPY INITIAL EVALUATION ADULT - DIAGNOSIS, PT EVAL
+ UTI, +urinary retention (straight cathed on admission for >1L dark pungent urine) B knee pain, gait impairment, generalized weakness, schizophrenia, depression/anxiety
Acute encephalopathy secondary to UTI, lethargic , weakness, Schizoaffective disease,

## 2019-08-21 NOTE — PHYSICAL THERAPY INITIAL EVALUATION ADULT - GAIT DEVIATIONS NOTED, PT EVAL
decreased step length/decreased stride length/mildly stooped ,knees min flexed thruout the gait cycle

## 2019-08-21 NOTE — PHYSICAL THERAPY INITIAL EVALUATION ADULT - GENERAL OBSERVATIONS, REHAB EVAL
sitting up in bedside chair,had hair washed by NA ,Lidoderm patches to B anterior knees ,awake,alert, Ox>2,speech mildly dysarthric and difficult to understand at times, needed pt to repeat herself several times

## 2019-08-21 NOTE — CDI QUERY NOTE - NSCDIOTHERTXTBX_GEN_ALL_CORE_HH
Patient documented as having "Acute Encephalopathy". If possible, please further specify the type of encephalopathy      Encephalopathy (a reversible condition)        -Type (anoxic, toxic –specify drug, hepatic, metabolic, hypertensive, traumatic, etc.)         Other         Not Clinically significant    SUPPORTING DOCUMENTATION AND/OR CLINICAL EVIDENCE:  history of schizophrenia/schizoaffective disorder  P/W AMS, LETHARGY, WEAKNESS  p/w UTI, urinary retention, out pt course of macrobid.  At baseline forgetful at times generally alert and oriented, hx obtained from daughter at bedside.

## 2019-08-21 NOTE — PHYSICAL THERAPY INITIAL EVALUATION ADULT - PERTINENT HX OF CURRENT PROBLEM, REHAB EVAL
had started Macrobid for UTI sx at assisted living ,increasing diaper changes,increased time spent in BR c/o difficulty urinating,became increasingly lethargic and was advised by PMD to come to ED had started Macrobid for UTI sx at assisted living ,increasing diaper changes, increased time spent in BR c/o difficulty urinating,became increasingly lethargic and was advised by PMD to come to ED

## 2019-08-22 ENCOUNTER — TRANSCRIPTION ENCOUNTER (OUTPATIENT)
Age: 79
End: 2019-08-22

## 2019-08-22 VITALS
SYSTOLIC BLOOD PRESSURE: 154 MMHG | OXYGEN SATURATION: 95 % | HEART RATE: 90 BPM | RESPIRATION RATE: 17 BRPM | TEMPERATURE: 99 F | DIASTOLIC BLOOD PRESSURE: 77 MMHG

## 2019-08-22 DIAGNOSIS — F20.9 SCHIZOPHRENIA, UNSPECIFIED: ICD-10-CM

## 2019-08-22 LAB
ANION GAP SERPL CALC-SCNC: 5 MMOL/L — SIGNIFICANT CHANGE UP (ref 5–17)
BUN SERPL-MCNC: 17 MG/DL — SIGNIFICANT CHANGE UP (ref 7–23)
CALCIUM SERPL-MCNC: 8.9 MG/DL — SIGNIFICANT CHANGE UP (ref 8.5–10.1)
CHLORIDE SERPL-SCNC: 109 MMOL/L — HIGH (ref 96–108)
CO2 SERPL-SCNC: 27 MMOL/L — SIGNIFICANT CHANGE UP (ref 22–31)
CREAT SERPL-MCNC: 0.74 MG/DL — SIGNIFICANT CHANGE UP (ref 0.5–1.3)
GLUCOSE SERPL-MCNC: 94 MG/DL — SIGNIFICANT CHANGE UP (ref 70–99)
HCT VFR BLD CALC: 31.6 % — LOW (ref 34.5–45)
HGB BLD-MCNC: 10.5 G/DL — LOW (ref 11.5–15.5)
MCHC RBC-ENTMCNC: 29.6 PG — SIGNIFICANT CHANGE UP (ref 27–34)
MCHC RBC-ENTMCNC: 33.2 GM/DL — SIGNIFICANT CHANGE UP (ref 32–36)
MCV RBC AUTO: 89 FL — SIGNIFICANT CHANGE UP (ref 80–100)
PLATELET # BLD AUTO: 157 K/UL — SIGNIFICANT CHANGE UP (ref 150–400)
POTASSIUM SERPL-MCNC: 3.9 MMOL/L — SIGNIFICANT CHANGE UP (ref 3.5–5.3)
POTASSIUM SERPL-SCNC: 3.9 MMOL/L — SIGNIFICANT CHANGE UP (ref 3.5–5.3)
RBC # BLD: 3.55 M/UL — LOW (ref 3.8–5.2)
RBC # FLD: 12.2 % — SIGNIFICANT CHANGE UP (ref 10.3–14.5)
SODIUM SERPL-SCNC: 141 MMOL/L — SIGNIFICANT CHANGE UP (ref 135–145)
WBC # BLD: 6.82 K/UL — SIGNIFICANT CHANGE UP (ref 3.8–10.5)
WBC # FLD AUTO: 6.82 K/UL — SIGNIFICANT CHANGE UP (ref 3.8–10.5)

## 2019-08-22 PROCEDURE — 99231 SBSQ HOSP IP/OBS SF/LOW 25: CPT

## 2019-08-22 RX ORDER — IBUPROFEN 200 MG
1 TABLET ORAL
Qty: 0 | Refills: 0 | DISCHARGE

## 2019-08-22 RX ORDER — QUETIAPINE FUMARATE 200 MG/1
1 TABLET, FILM COATED ORAL
Qty: 0 | Refills: 0 | DISCHARGE

## 2019-08-22 RX ORDER — QUETIAPINE FUMARATE 200 MG/1
3 TABLET, FILM COATED ORAL
Qty: 90 | Refills: 0
Start: 2019-08-22 | End: 2019-09-20

## 2019-08-22 RX ORDER — QUETIAPINE FUMARATE 200 MG/1
150 TABLET, FILM COATED ORAL AT BEDTIME
Refills: 0 | Status: DISCONTINUED | OUTPATIENT
Start: 2019-08-22 | End: 2019-08-22

## 2019-08-22 RX ORDER — QUETIAPINE FUMARATE 200 MG/1
100 TABLET, FILM COATED ORAL DAILY
Refills: 0 | Status: DISCONTINUED | OUTPATIENT
Start: 2019-08-22 | End: 2019-08-22

## 2019-08-22 RX ORDER — QUETIAPINE FUMARATE 200 MG/1
1 TABLET, FILM COATED ORAL
Qty: 30 | Refills: 0
Start: 2019-08-22 | End: 2019-09-20

## 2019-08-22 RX ADMIN — HEPARIN SODIUM 5000 UNIT(S): 5000 INJECTION INTRAVENOUS; SUBCUTANEOUS at 14:34

## 2019-08-22 RX ADMIN — LIDOCAINE 2 PATCH: 4 CREAM TOPICAL at 11:41

## 2019-08-22 RX ADMIN — LETROZOLE 2.5 MILLIGRAM(S): 2.5 TABLET, FILM COATED ORAL at 11:42

## 2019-08-22 RX ADMIN — Medication 250 MILLIGRAM(S): at 17:21

## 2019-08-22 RX ADMIN — QUETIAPINE FUMARATE 100 MILLIGRAM(S): 200 TABLET, FILM COATED ORAL at 11:40

## 2019-08-22 RX ADMIN — HEPARIN SODIUM 5000 UNIT(S): 5000 INJECTION INTRAVENOUS; SUBCUTANEOUS at 05:12

## 2019-08-22 RX ADMIN — SERTRALINE 150 MILLIGRAM(S): 25 TABLET, FILM COATED ORAL at 12:34

## 2019-08-22 RX ADMIN — Medication 1 MILLIGRAM(S): at 17:21

## 2019-08-22 RX ADMIN — Medication 250 MILLIGRAM(S): at 05:12

## 2019-08-22 RX ADMIN — Medication 1 MILLIGRAM(S): at 05:12

## 2019-08-22 NOTE — BEHAVIORAL HEALTH ASSESSMENT NOTE - RISK ASSESSMENT
LOW RISK    ACUTE RISK FACTORS: medical comorbidities, acute medical concerns, recent AMS    CHRONIC RISK FACTORS: schizophrenia, medical comorbidities    PROTECTIVE FACTORS: no suicidal ideation/intent/plan, no auditory / visual hallucinations, no active psychosis, living in supervised setting, daughter, medication compliance.

## 2019-08-22 NOTE — PROVIDER CONTACT NOTE (OTHER) - ASSESSMENT
Pt able to get up from supine position in bed to standing position independently without assistance. Pt ambulated independently in hallway and to bathroom using only a walker. Patient ambulated 150 feet independently in hallway using walker.

## 2019-08-22 NOTE — DISCHARGE NOTE PROVIDER - PROVIDER TOKENS
FREE:[LAST:[PCP],PHONE:[(   )    -],FAX:[(   )    -],FOLLOWUP:[1-3 days]],PROVIDER:[TOKEN:[[id number]],FOLLOWUP:[1 week]] PROVIDER:[TOKEN:[[id number]],FOLLOWUP:[1 week]],FREE:[LAST:[PCP],PHONE:[(   )    -],FAX:[(   )    -],FOLLOWUP:[1-3 days]],PROVIDER:[TOKEN:[[id number]]] PROVIDER:[TOKEN:[[id number]],FOLLOWUP:[1 week]],PROVIDER:[TOKEN:[[id number]]],PROVIDER:[TOKEN:[[id number]]]

## 2019-08-22 NOTE — DISCHARGE NOTE PROVIDER - NSDCFUADDINST_GEN_ALL_CORE_FT
LOWER CHEST: There is posterior dependent and subsegmental atelectasis.   There are no pleural or pericardial effusions. The heart size is normal.    LIVER: Within normal limits.  BILE DUCTS: Normal caliber.  GALLBLADDER: Large 3 x 2 cm calcified gallstone is again noted within the   gallbladder lumen.   SPLEEN: Within normal limits.  PANCREAS: Within normal limits.  ADRENALS: Within normal limits.  KIDNEYS/URETERS: No renal stones or hydronephrosis. Symmetric   enhancement. Again noted is a 3.3 x 4 cm cyst at the lower pole of the   right kidney.    BLADDER: Partially decompressed around a Flor catheter.  REPRODUCTIVE ORGANS: The uterus is present. Stable 1.8 cm left ovarian   cyst.    BOWEL: No bowel obstruction. Appendix is within normal limits.  PERITONEUM: No ascites.  VESSELS: The aorta and IVC are normal in caliber and patent.  RETROPERITONEUM/LYMPH NODES: No lymphadenopathy.    ABDOMINAL WALL: Within normal limits.  BONES: Degenerative change of the thoracolumbar spine.    IMPRESSION:     Large 3 cm gallstone within the gallbladder again noted. If gallbladder   pathology is clinically suspected, further assessment with ultrasound is   recommended.    Other incidental findings are as above.          < end of copied text >    The brain demonstrates mild periventricular white matter ischemia.   No   acute cerebral cortical infarct is seen.  No intracranial hemorrhage is   found.  No mass effect is found in the brain.      The ventricles, sulci and basal cisterns show global atrophy most   prominent in the frontal and temporal lobes.    The orbits are unremarkable.  The paranasal sinuses are clear.  The nasal   cavity appears intact.  The nasopharynx is symmetric.  The central skull   base, petrous temporal bones and calvarium remain intact.      IMPRESSION:   Mild periventricular white matter ischemia. Global atrophy   most prominent in the BILATERAL frontal and temporal lobes.      < end of copied text >

## 2019-08-22 NOTE — BEHAVIORAL HEALTH ASSESSMENT NOTE - NSBHCHARTREVIEWVS_PSY_A_CORE FT
Vital Signs Last 24 Hrs  T(C): 36.7 (22 Aug 2019 05:11), Max: 37.4 (21 Aug 2019 12:34)  T(F): 98.1 (22 Aug 2019 05:11), Max: 99.3 (21 Aug 2019 12:34)  HR: 104 (22 Aug 2019 05:11) (102 - 104)  BP: 150/61 (22 Aug 2019 05:11) (113/69 - 150/61)  BP(mean): --  RR: 16 (22 Aug 2019 05:11) (16 - 16)  SpO2: 95% (22 Aug 2019 05:11) (95% - 95%)

## 2019-08-22 NOTE — BEHAVIORAL HEALTH ASSESSMENT NOTE - NSBHREFERDETAILS_PSY_A_CORE_FT
urinary retention to minimize psychiatric medication which can cause urinary retention - SSRI, antipsychotic

## 2019-08-22 NOTE — BEHAVIORAL HEALTH ASSESSMENT NOTE - HPI (INCLUDE ILLNESS QUALITY, SEVERITY, DURATION, TIMING, CONTEXT, MODIFYING FACTORS, ASSOCIATED SIGNS AND SYMPTOMS)
79 year-old female with history of Schizophrenia, medical history of breast CA,  presented to ED for AMS, lethargy, urinary symptoms and diarrhea.   Patient is documented to have been recently treated as outpatient Thursday 8.15.19 for UTI on Macrobid presents with AMS. Patient consulted for medication management secondary to urinary retention in the setting of being on psychotropic management that may cause such symptoms.     Patient presenting as a limited and unreliable historian, appearing intellectually delayed, smiling inappropriately; not talking to self however; not appearing to be responding to internal stimuli. Patient has impairment of speech articulation. Patient has poor recent and remote memory. Patient able to answer simple questions: denies depressed mood, hopelessness, suicidal ideation. Denies manic symptoms. Denies psychotic symptoms including auditory / visual hallucinations. Denies paranoia. No delusions elicited.     Attempted calling Lea, daughter for collateral, however unable to reach.

## 2019-08-22 NOTE — DISCHARGE NOTE PROVIDER - CARE PROVIDERS DIRECT ADDRESSES
,DirectAddress_Unknown,[email or web address],DirectAddress_Unknown,DirectAddress_Unknown

## 2019-08-22 NOTE — BEHAVIORAL HEALTH ASSESSMENT NOTE - SUMMARY
79 year-old female with history of Schizophrenia, medical history of breast CA,  presented to ED for AMS, lethargy, urinary symptoms and diarrhea.   Patient is documented to have been recently treated as outpatient Thursday 8.15.19 for UTI on Macrobid presents with AMS. Patient consulted for medication management secondary to urinary retention in the setting of being on psychotropic management that may cause such symptoms.     Patient presenting as a limited and unreliable historian, appearing intellectually delayed. Patient inappropriately reactive, however not appearing to be responding to internal stimuli. Patient able to answer simple questions. Patient not appearing floridly delirious. Patient has no florid active psychotic symptoms. Patient is not depressed or suicidal. Baseline pending confirmation from collateral is pivotal in making more accurate psychiatric assessment. Nonetheless, at this time, given what is known from presentation: patient symptoms not indicating imminent risk for harm to self; not warranting involuntary in-patient hospitalization.    As for psychotropically related urinary retention, Seroquel has a documented history of being linked to urinary retention episode when patients are on dosages ~300 mg daily due to anticholinergic activity. Hence. Slow reduction of dose is expected to assist in management of urinary retention. Risk of patient decompensating remains, however will continue to monitor daily and make necessary adjustments. Zoloft has no known impact in this regard: hence. No changes will be made to this medication.    PLAN:  #REDUCE TO: Seroquel 100 mg in the morning and 150 mg at bedtime.  #CONTINUE: Zoloft 150 mg daily  #CONTINUE: Trazodone 50 mg at bedtime

## 2019-08-22 NOTE — BEHAVIORAL HEALTH ASSESSMENT NOTE - OTHER
AMS living in supervised housing at nursing home deferred - sitting in chair aphasia inappropriately reactive: ?baseline peers history of psychosis limited cooperation in the setting of mental status that be a result of age related cognitive decline.

## 2019-08-22 NOTE — DISCHARGE NOTE PROVIDER - NSDCCPCAREPLAN_GEN_ALL_CORE_FT
PRINCIPAL DISCHARGE DIAGNOSIS  Diagnosis: Altered mental status  Assessment and Plan of Treatment: minimize centrally actining medication, follow up with psychiatrist within 1 week      SECONDARY DISCHARGE DIAGNOSES  Diagnosis: Cholelithiasis  Assessment and Plan of Treatment: with 3 cm gallbladder stone on CT,  return to ED if you developed right sided abdominal pain    Diagnosis: Cyst, ovarian  Assessment and Plan of Treatment: follow up with GYN within 1 -3 weeks for further work-up and monitoring    Diagnosis: Hematuria  Assessment and Plan of Treatment: right renal cyst noted, follow up with urologist within 1week for further care    Diagnosis: Urinary retention  Assessment and Plan of Treatment: s/p Flor, monitor voiding , follow up with urologist within 1 week    Diagnosis: Schizophrenia, unspecified type  Assessment and Plan of Treatment: follow up with pcyhiatrist  within 1 week for monitoring antipsychotic medications

## 2019-08-22 NOTE — DISCHARGE NOTE PROVIDER - HOSPITAL COURSE
Patient is a 79y old  Female who presents with a chief complaint of Acute encephalopathy           HPI:          80 yo female with a pmh/o schizophrenia/schizoaffective disease, breast CA treated with radiation and on oral chemo, GERD, UTI, who presents to ED on 8/19/19  with daughter due to weakness and frequent urination. History taken from daughter via telephone as pt not good historian due to lethargy. Daughter states pt had been being treated at the [facility] with macrobid for UTI after she noticed her mother was c/o urinary sx and needed frequent diaper changes and couldn't leave the bathroom because she was having trouble urinating'. Daughter made appt for urologist however as pt symptomatic and becoming weak and lethargic, pt saw pmd Dr. Elizondo who referred her to ED. Pt originally had that appt scheduled for what she felt was a rectal mass however daughter does not know exactly what 'that was about'.  Pt in ED found to have UTI and urinary retention. On insertion of ashraf, >1L removed of dark pungent urine. 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 Pt over past four years has had significant decline in functioning, used to ambulate with cane due to arthritis and now is in walker. Daughter attributes her sx to the passing of her  which happened shortly before her decline. Daughter states she has dealt with depression chronically and is a 'functioning schizoaffective'. Pt noted to be tearful in ED. Pt denies any c/o at time of exam however was not cooperative with physical and stated she 'just want to get some sleep'          8/20 - Patient seen and examined at bedside earlier today, lethargic in am, reports bilateral knee pain, poor historian, denies cp, dyspnea, abdominal pain, headache    8/21 - pt seen and examined, less confused today, OOB in the chair, denies pain, cp, palpitations, afebrile     8/22 - pt seen and examined, denies pain, able to urinate after ashraf removal, afebriel    Review of system- Rest of the review of system are negative except mentioned in HPI    T(C): 37.1 (08-22-19 @ 11:08), Max: 37.1 (08-22-19 @ 11:08)    T(F): 98.8 (08-22-19 @ 11:08), Max: 98.8 (08-22-19 @ 11:08)    HR: 90 (08-22-19 @ 11:08) (90 - 104)    BP: 154/77 (08-22-19 @ 11:08) (150/61 - 154/77)    RR: 17 (08-22-19 @ 11:08) (16 - 17)    SpO2: 95% (08-22-19 @ 11:08) (95% - 95%)    Wt(kg): --        NERVOUS SYSTEM:  Alert & Oriented X3, non- focal exam,     HEAD:  Atraumatic, Normocephalic    EYES: EOMI, PERRLA, conjunctiva and sclera clear    HEENT: Moist mucous membranes    NECK: Supple, No JVD    CHEST/LUNG: Clear to auscultation bilaterally; No rales, no rhonchi, no wheezing, or rubs    HEART: Regular rate and rhythm; No murmurs, rubs, or gallops    ABDOMEN: Soft, Nontender, Nondistended; Bowel sounds present    GENITOURINARY- Voiding, no suprapubic tenderness    EXTREMITIES:  2+ Peripheral Pulses, No clubbing, cyanosis, or edema    MUSCULOSKELETAL:- No muscle tenderness, Muscle tone normal, No joint tenderness, no Joint swelling, Joint range of motion-normal    SKIN-no rash, no lesion        * Acute Toxic  Encephalopathy  due to adverse drug effect - antipsychotic, benzo, SSRI  , resolved    * Urinary retention s/p Ashraf , successful voiding trial     * Pyuria , doubt UTI  due to negative  urine cultures    * Hematuria  Right kidney cyst 3.3x4 cm     * Cholelithiasis large 3 cm     * Left ovarian cyst 1.8 cm     - c/w Rocephin    - IV fluids    - s/p Ashraf   >1L after  retention, voiding after removal of ashraf     - tylenol prn fever/pain    - CT head -  mild cerebrovascular disease, global atrophy     - CT abd  - note    - psychiatry consult - to minimized psychiatric medication which can cause urinary retention     - urology consult  for hematuria and renal cyst  - need outpatient follow up         * Elevated Alk phos, resolved     No acute intrabdominal findings, 3cm gall bladder stone chronic and pt w/o abd pain, n/v        *  Schizoaffective disease     * Depression and anxiety     cont medications: ativan bid, seroquel 125 am 175 pm--> 100 in am 150 pm  , zoloft 150 , and trazodone 50 , ativan 1 bid    psychiatry consult to minimize the meds that can cause urinary retention ( SSRI, antipsychotics )  - noted         * Breast Cancer    cont Femara, in remission as per daughter        * Generalized  Weakness    * Bilateral Knee pain     lives at assisted living    PT consult placed as pt with progressive weakness and may need to transition to NN - may return to AL     -x ray  knees     - pain management , Lidoderm          * Hearing loss    - does not have hearing aids         Advance directives     -d/w  daughter  HCP  over the phone      Disposition - medically optimized to be discharged  to assisted living home with close follow up with PCP  with 1 week     return to ED if fever, abdominal pain, nausea, vomiting, chest pain, dyspnea    Discharge plan discussed with patient, RN    Patient advised to follow up with PCP within 3-7 days    time spend 40 min    Discharge note faxed to PCP with my contact information to call me back     PCP at assisted living facility

## 2019-08-22 NOTE — DISCHARGE NOTE PROVIDER - CARE PROVIDER_API CALL
PCP,   Phone: (   )    -  Fax: (   )    -  Follow Up Time: 1-3 days    Rosendo Cerna)  Urology  [address], [address]nd [place] [address]  Phone: [phone]  Fax: [phone]  Follow Up Time: 1 week Rosendo Cerna)  Urology  [address] [facility] [facility], [address]nd [place] [address]  Phone: [phone]  Fax: [phone]  Follow Up Time: 1 week    PCP,   Phone: (   )    -  Fax: (   )    -  Follow Up Time: 1-3 days    Thor Howard)  Hematology; Internal Medicine; Medical Oncology  [address] [place] [address]  Phone: [phone]  Fax: [phone]  Follow Up Time: Rosendo Cerna)  Urology  [address] [facility] [address] [facility], [address]nd [place] [address]  Phone: [phone]  Fax: [phone]  Follow Up Time: 1 week    Thor Howard)  Hematology; Internal Medicine; Medical Oncology  [address] [place] [address]  Phone: [phone]  Fax: [phone]  Follow Up Time:     Jose Elizondo)  Internal Medicine  [address] [place] [address]  Phone: [phone]  Fax: [phone]  Follow Up Time:

## 2019-08-22 NOTE — DISCHARGE NOTE NURSING/CASE MANAGEMENT/SOCIAL WORK - NSDCDPATPORTLINK_GEN_ALL_CORE
You can access the [facility] Patient Portal, offered by [facility], by registering with the following website: [email or web address]

## 2019-08-24 LAB
CULTURE RESULTS: SIGNIFICANT CHANGE UP
CULTURE RESULTS: SIGNIFICANT CHANGE UP
SPECIMEN SOURCE: SIGNIFICANT CHANGE UP
SPECIMEN SOURCE: SIGNIFICANT CHANGE UP

## 2019-08-27 ENCOUNTER — APPOINTMENT (OUTPATIENT)
Dept: UROGYNECOLOGY | Facility: CLINIC | Age: 79
End: 2019-08-27
Payer: MEDICARE

## 2019-08-27 VITALS
SYSTOLIC BLOOD PRESSURE: 147 MMHG | DIASTOLIC BLOOD PRESSURE: 85 MMHG | BODY MASS INDEX: 25.61 KG/M2 | WEIGHT: 150 LBS | HEIGHT: 64 IN | HEART RATE: 105 BPM

## 2019-08-27 DIAGNOSIS — N39.41 URGE INCONTINENCE: ICD-10-CM

## 2019-08-27 DIAGNOSIS — N39.44 NOCTURNAL ENURESIS: ICD-10-CM

## 2019-08-27 DIAGNOSIS — N81.11 CYSTOCELE, MIDLINE: ICD-10-CM

## 2019-08-27 DIAGNOSIS — N39.42 INCONTINENCE W/OUT SENSORY AWARENESS: ICD-10-CM

## 2019-08-27 LAB
BILIRUB UR QL STRIP: NORMAL
CLARITY UR: CLEAR
COLLECTION METHOD: NORMAL
GLUCOSE UR-MCNC: NORMAL
HCG UR QL: 0.2 EU/DL
HGB UR QL STRIP.AUTO: NORMAL
KETONES UR-MCNC: NORMAL
LEUKOCYTE ESTERASE UR QL STRIP: NORMAL
NITRITE UR QL STRIP: NORMAL
PH UR STRIP: 5.5
PROT UR STRIP-MCNC: NORMAL
SP GR UR STRIP: 1.01

## 2019-08-27 PROCEDURE — 99204 OFFICE O/P NEW MOD 45 MIN: CPT | Mod: 25

## 2019-08-27 PROCEDURE — 51701 INSERT BLADDER CATHETER: CPT

## 2019-08-28 NOTE — PHYSICAL EXAM
[No Acute Distress] : in no acute distress [Well developed] : well developed [Well Nourished] : ~L well nourished [Appropriate] : appropriate [Warm and Dry] : was warm and dry to touch [Labia Majora] : were normal [Labia Minora] : were normal [Normal Appearance] : general appearance was normal [No Bleeding] : there was no active vaginal bleeding [Normal] : no abnormalities [Vulvar Atrophy] : vulvar atrophy [Atrophy] : atrophy [Exam Deferred] : was deferred [Aa ____] : Aa [unfilled] [Ba ____] : Ba [unfilled] [Normal Mood/Affect] : mood and affect are normal [Normal Gait] : gait was normal [Oriented x3] : disoriented to person, place, or time [Normal Memory] : ~T memory was ~L impaired [Cough] : no cough [Tenderness] : ~T no ~M abdominal tenderness observed [Distended] : not distended [H/Smegaly] : no hepatosplenomegaly [Inguinal LAD] : no adenopathy was noted in the inguinal lymph nodes

## 2019-08-28 NOTE — PROCEDURE
[Fluid Management] : fluid management [Bowel Management] : bowel management [Good Fit] : fits well [None] : no bleeding [Pessary Inserted] : inserted [Erosion] : no evidence of erosion [Erythema] : no erythema [Infection] : no evidence of infection [Discharge] : no vaginal discharge [FreeTextEntry1] : \par Sterile straight catheterization was performed to rule out infection and to measure a postvoid residual volume which was 30 cc

## 2019-08-28 NOTE — HISTORY OF PRESENT ILLNESS
[Unable To Restrain Bowel Movement] : daily [Stress Incontinence] : daily [] : years ago [Constipation Obstructed Defecation] : none [Cystocele (Obstetric)] : none [Vaginal Wall Prolapse] : none [Rectal Prolapse] : none [Urinary Frequency] : none [x2] : two times a night [Feelings Of Urinary Urgency] : none [Pain During Urination (Dysuria)] : none [Urinary Tract Infection] : rare [Hematuria] : none [Stool Visible Blood] : none [Incomplete Emptying Of Stool] : none [Vaginal Pain] : none [Pelvic Pain] : none [de-identified] : uses pads daily [de-identified] : uses depends diapers at night, soaks one per night [de-identified] : Wears 3 depends simultaneously to bed, only needs to change innermost one  [FreeTextEntry1] : \par 78yo with functional and possible urge incontinence for the last year. Patient states symptoms were much worse prior to being hospitalized for altered mental status. Was diagnosed with a UTI and urinary retention during that hospitalization. Since then, her leakage episodes have decreased in frequency. She wears 1 depends during the day and 3 (simultaneously) at night. Patient is aware of her leakage, but cannot say when it happens exactly. As per her daughter, her depends were soaked when removed today for the exam. Patient was unable to say when it happened. \par \par PMH: schizoaffective disorder, h/o breast ca s/p left lumpectomy and radiation, depression, constipation, back problems\par PSH: breast lumpectomy\par Social History: , nonsmoker, retired\par \par daily fluid intake: 3 c tea + 1-2 bottles of water + 8oz juice

## 2019-08-28 NOTE — DISCUSSION/SUMMARY
[FreeTextEntry1] : Monserrat presents with urinary incontinence and was found to have a stage 2 cystocele on exam. We reviewed her symptoms and discussed treatment options. I recommend she start behavioral and fluid modifications. Written instructions provided and reviewed. In addition, I recommend a pessary trial to see if reducing her prolapse improves her urinary symptoms. Ring with support #4 pessary inserted today. Pessary precautions and instructions reviewed. Will RTO in 2-3 weeks for follow up. If no improvement in her urinary symptoms, will remove the pessary and discuss additional workup and treatment options. \par \par Urine specimen collected today for testing\par \par The following was provided to her in written form and reviewed extensively. Patient was given a copy to take home: \par For Urinary Symptoms:\par **Total fluids: 34-50 oz (1-1.5 Liters) per day\par   -Ex. 8 oz tea, 2-3 bottles of water (Each bottle is 16.9 oz). No flavoring added. No seltzer or Pelligrino!\par  -Drink slowly throughout day, no binge drinking (each bottle of water should take you hours, not minutes)\par **Avoid: coffee, tea, alcohol, carbonation (soda, seltzer), spicy foods, citrus, artificial sweeteners, chocolate\par **Stop eating and drinking 2-3 hours before bedtime (sips ok)\par \par Ring pessary inserted today. Will try this for 2-3 weeks to see if lifting up your bladder improves your urinary symptoms.\par \par \par \par

## 2019-08-29 DIAGNOSIS — N83.202 UNSPECIFIED OVARIAN CYST, LEFT SIDE: ICD-10-CM

## 2019-08-29 DIAGNOSIS — G92 TOXIC ENCEPHALOPATHY: ICD-10-CM

## 2019-08-29 DIAGNOSIS — K80.20 CALCULUS OF GALLBLADDER WITHOUT CHOLECYSTITIS WITHOUT OBSTRUCTION: ICD-10-CM

## 2019-08-29 DIAGNOSIS — Z92.3 PERSONAL HISTORY OF IRRADIATION: ICD-10-CM

## 2019-08-29 DIAGNOSIS — R33.9 RETENTION OF URINE, UNSPECIFIED: ICD-10-CM

## 2019-08-29 DIAGNOSIS — F20.9 SCHIZOPHRENIA, UNSPECIFIED: ICD-10-CM

## 2019-08-29 DIAGNOSIS — Z92.21 PERSONAL HISTORY OF ANTINEOPLASTIC CHEMOTHERAPY: ICD-10-CM
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
## 2019-08-29 DIAGNOSIS — Z88.8 ALLERGY STATUS TO OTHER DRUGS, MEDICAMENTS AND BIOLOGICAL SUBSTANCES STATUS: ICD-10-CM

## 2019-08-29 DIAGNOSIS — N28.1 CYST OF KIDNEY, ACQUIRED: ICD-10-CM

## 2019-08-29 DIAGNOSIS — T42.4X5A ADVERSE EFFECT OF BENZODIAZEPINES, INITIAL ENCOUNTER: ICD-10-CM

## 2019-08-29 DIAGNOSIS — Z85.3 PERSONAL HISTORY OF MALIGNANT NEOPLASM OF BREAST: ICD-10-CM

## 2019-08-29 DIAGNOSIS — K21.9 GASTRO-ESOPHAGEAL REFLUX DISEASE WITHOUT ESOPHAGITIS: ICD-10-CM

## 2019-09-09 PROCEDURE — 99213 OFFICE O/P EST LOW 20 MIN: CPT

## 2019-09-14 ENCOUNTER — INPATIENT (INPATIENT)
Facility: HOSPITAL | Age: 79
LOS: 15 days | Discharge: HOSPICE | DRG: 689 | End: 2019-09-30
Attending: INTERNAL MEDICINE | Admitting: FAMILY MEDICINE
Payer: MEDICARE

## 2019-09-14 VITALS
WEIGHT: 160.06 LBS | SYSTOLIC BLOOD PRESSURE: 111 MMHG | DIASTOLIC BLOOD PRESSURE: 58 MMHG | HEART RATE: 103 BPM | RESPIRATION RATE: 18 BRPM | OXYGEN SATURATION: 96 % | HEIGHT: 64 IN

## 2019-09-14 DIAGNOSIS — N39.0 URINARY TRACT INFECTION, SITE NOT SPECIFIED: ICD-10-CM

## 2019-09-14 LAB
ALBUMIN SERPL ELPH-MCNC: 3.8 G/DL — SIGNIFICANT CHANGE UP (ref 3.3–5)
ALP SERPL-CCNC: 155 U/L — HIGH (ref 40–120)
ALT FLD-CCNC: 24 U/L — SIGNIFICANT CHANGE UP (ref 12–78)
ANION GAP SERPL CALC-SCNC: 8 MMOL/L — SIGNIFICANT CHANGE UP (ref 5–17)
APPEARANCE UR: ABNORMAL
APTT BLD: 65 SEC — HIGH (ref 27.5–36.3)
AST SERPL-CCNC: 20 U/L — SIGNIFICANT CHANGE UP (ref 15–37)
BASOPHILS # BLD AUTO: 0.04 K/UL — SIGNIFICANT CHANGE UP (ref 0–0.2)
BASOPHILS NFR BLD AUTO: 0.5 % — SIGNIFICANT CHANGE UP (ref 0–2)
BILIRUB SERPL-MCNC: 0.6 MG/DL — SIGNIFICANT CHANGE UP (ref 0.2–1.2)
BILIRUB UR-MCNC: NEGATIVE — SIGNIFICANT CHANGE UP
BUN SERPL-MCNC: 14 MG/DL — SIGNIFICANT CHANGE UP (ref 7–23)
CALCIUM SERPL-MCNC: 9.3 MG/DL — SIGNIFICANT CHANGE UP (ref 8.5–10.1)
CHLORIDE SERPL-SCNC: 100 MMOL/L — SIGNIFICANT CHANGE UP (ref 96–108)
CO2 SERPL-SCNC: 25 MMOL/L — SIGNIFICANT CHANGE UP (ref 22–31)
COLOR SPEC: YELLOW — SIGNIFICANT CHANGE UP
CREAT SERPL-MCNC: 0.84 MG/DL — SIGNIFICANT CHANGE UP (ref 0.5–1.3)
DIFF PNL FLD: ABNORMAL
EOSINOPHIL # BLD AUTO: 0.02 K/UL — SIGNIFICANT CHANGE UP (ref 0–0.5)
EOSINOPHIL NFR BLD AUTO: 0.2 % — SIGNIFICANT CHANGE UP (ref 0–6)
GLUCOSE SERPL-MCNC: 123 MG/DL — HIGH (ref 70–99)
GLUCOSE UR QL: NEGATIVE MG/DL — SIGNIFICANT CHANGE UP
HCT VFR BLD CALC: 36.4 % — SIGNIFICANT CHANGE UP (ref 34.5–45)
HGB BLD-MCNC: 12.3 G/DL — SIGNIFICANT CHANGE UP (ref 11.5–15.5)
IMM GRANULOCYTES NFR BLD AUTO: 0.4 % — SIGNIFICANT CHANGE UP (ref 0–1.5)
INR BLD: 1.22 RATIO — HIGH (ref 0.88–1.16)
KETONES UR-MCNC: NEGATIVE — SIGNIFICANT CHANGE UP
LACTATE SERPL-SCNC: 1.2 MMOL/L — SIGNIFICANT CHANGE UP (ref 0.7–2)
LEUKOCYTE ESTERASE UR-ACNC: ABNORMAL
LYMPHOCYTES # BLD AUTO: 0.79 K/UL — LOW (ref 1–3.3)
LYMPHOCYTES # BLD AUTO: 9.7 % — LOW (ref 13–44)
MCHC RBC-ENTMCNC: 29.5 PG — SIGNIFICANT CHANGE UP (ref 27–34)
MCHC RBC-ENTMCNC: 33.8 GM/DL — SIGNIFICANT CHANGE UP (ref 32–36)
MCV RBC AUTO: 87.3 FL — SIGNIFICANT CHANGE UP (ref 80–100)
MONOCYTES # BLD AUTO: 0.67 K/UL — SIGNIFICANT CHANGE UP (ref 0–0.9)
MONOCYTES NFR BLD AUTO: 8.3 % — SIGNIFICANT CHANGE UP (ref 2–14)
NEUTROPHILS # BLD AUTO: 6.56 K/UL — SIGNIFICANT CHANGE UP (ref 1.8–7.4)
NEUTROPHILS NFR BLD AUTO: 80.9 % — HIGH (ref 43–77)
NITRITE UR-MCNC: POSITIVE
PH UR: 6 — SIGNIFICANT CHANGE UP (ref 5–8)
PLATELET # BLD AUTO: 201 K/UL — SIGNIFICANT CHANGE UP (ref 150–400)
POTASSIUM SERPL-MCNC: 4.2 MMOL/L — SIGNIFICANT CHANGE UP (ref 3.5–5.3)
POTASSIUM SERPL-SCNC: 4.2 MMOL/L — SIGNIFICANT CHANGE UP (ref 3.5–5.3)
PROT SERPL-MCNC: 7.9 GM/DL — SIGNIFICANT CHANGE UP (ref 6–8.3)
PROT UR-MCNC: NEGATIVE MG/DL — SIGNIFICANT CHANGE UP
PROTHROM AB SERPL-ACNC: 13.6 SEC — HIGH (ref 10–12.9)
RBC # BLD: 4.17 M/UL — SIGNIFICANT CHANGE UP (ref 3.8–5.2)
RBC # FLD: 12.2 % — SIGNIFICANT CHANGE UP (ref 10.3–14.5)
SODIUM SERPL-SCNC: 133 MMOL/L — LOW (ref 135–145)
SP GR SPEC: 1.01 — SIGNIFICANT CHANGE UP (ref 1.01–1.02)
UROBILINOGEN FLD QL: NEGATIVE MG/DL — SIGNIFICANT CHANGE UP
WBC # BLD: 8.11 K/UL — SIGNIFICANT CHANGE UP (ref 3.8–10.5)
WBC # FLD AUTO: 8.11 K/UL — SIGNIFICANT CHANGE UP (ref 3.8–10.5)

## 2019-09-14 PROCEDURE — 84484 ASSAY OF TROPONIN QUANT: CPT

## 2019-09-14 PROCEDURE — 82533 TOTAL CORTISOL: CPT

## 2019-09-14 PROCEDURE — 95953: CPT

## 2019-09-14 PROCEDURE — 94003 VENT MGMT INPAT SUBQ DAY: CPT

## 2019-09-14 PROCEDURE — 82803 BLOOD GASES ANY COMBINATION: CPT

## 2019-09-14 PROCEDURE — 83735 ASSAY OF MAGNESIUM: CPT

## 2019-09-14 PROCEDURE — 94002 VENT MGMT INPAT INIT DAY: CPT

## 2019-09-14 PROCEDURE — 97162 PT EVAL MOD COMPLEX 30 MIN: CPT | Mod: GP

## 2019-09-14 PROCEDURE — 83935 ASSAY OF URINE OSMOLALITY: CPT

## 2019-09-14 PROCEDURE — 95819 EEG AWAKE AND ASLEEP: CPT

## 2019-09-14 PROCEDURE — 84443 ASSAY THYROID STIM HORMONE: CPT

## 2019-09-14 PROCEDURE — 36600 WITHDRAWAL OF ARTERIAL BLOOD: CPT

## 2019-09-14 PROCEDURE — 70551 MRI BRAIN STEM W/O DYE: CPT

## 2019-09-14 PROCEDURE — 95951: CPT

## 2019-09-14 PROCEDURE — 93306 TTE W/DOPPLER COMPLETE: CPT

## 2019-09-14 PROCEDURE — 87040 BLOOD CULTURE FOR BACTERIA: CPT

## 2019-09-14 PROCEDURE — 95957 EEG DIGITAL ANALYSIS: CPT

## 2019-09-14 PROCEDURE — 85610 PROTHROMBIN TIME: CPT

## 2019-09-14 PROCEDURE — 87633 RESP VIRUS 12-25 TARGETS: CPT

## 2019-09-14 PROCEDURE — 85025 COMPLETE CBC W/AUTO DIFF WBC: CPT

## 2019-09-14 PROCEDURE — 80048 BASIC METABOLIC PNL TOTAL CA: CPT

## 2019-09-14 PROCEDURE — 84300 ASSAY OF URINE SODIUM: CPT

## 2019-09-14 PROCEDURE — 93005 ELECTROCARDIOGRAM TRACING: CPT

## 2019-09-14 PROCEDURE — 70450 CT HEAD/BRAIN W/O DYE: CPT | Mod: 26

## 2019-09-14 PROCEDURE — 71045 X-RAY EXAM CHEST 1 VIEW: CPT

## 2019-09-14 PROCEDURE — 87581 M.PNEUMON DNA AMP PROBE: CPT

## 2019-09-14 PROCEDURE — C9113: CPT

## 2019-09-14 PROCEDURE — 82436 ASSAY OF URINE CHLORIDE: CPT

## 2019-09-14 PROCEDURE — 97116 GAIT TRAINING THERAPY: CPT | Mod: GP

## 2019-09-14 PROCEDURE — 84100 ASSAY OF PHOSPHORUS: CPT

## 2019-09-14 PROCEDURE — 36415 COLL VENOUS BLD VENIPUNCTURE: CPT

## 2019-09-14 PROCEDURE — 82962 GLUCOSE BLOOD TEST: CPT

## 2019-09-14 PROCEDURE — 71045 X-RAY EXAM CHEST 1 VIEW: CPT | Mod: 26

## 2019-09-14 PROCEDURE — 87486 CHLMYD PNEUM DNA AMP PROBE: CPT

## 2019-09-14 PROCEDURE — 87798 DETECT AGENT NOS DNA AMP: CPT

## 2019-09-14 PROCEDURE — 85027 COMPLETE CBC AUTOMATED: CPT

## 2019-09-14 PROCEDURE — 70450 CT HEAD/BRAIN W/O DYE: CPT

## 2019-09-14 RX ORDER — SODIUM CHLORIDE 9 MG/ML
1000 INJECTION INTRAMUSCULAR; INTRAVENOUS; SUBCUTANEOUS ONCE
Refills: 0 | Status: COMPLETED | OUTPATIENT
Start: 2019-09-14 | End: 2019-09-14

## 2019-09-14 RX ORDER — SERTRALINE 25 MG/1
150 TABLET, FILM COATED ORAL DAILY
Refills: 0 | Status: DISCONTINUED | OUTPATIENT
Start: 2019-09-14 | End: 2019-09-28

## 2019-09-14 RX ORDER — SENNA PLUS 8.6 MG/1
2 TABLET ORAL AT BEDTIME
Refills: 0 | Status: DISCONTINUED | OUTPATIENT
Start: 2019-09-14 | End: 2019-09-28

## 2019-09-14 RX ORDER — LANOLIN ALCOHOL/MO/W.PET/CERES
6 CREAM (GRAM) TOPICAL AT BEDTIME
Refills: 0 | Status: DISCONTINUED | OUTPATIENT
Start: 2019-09-14 | End: 2019-09-17

## 2019-09-14 RX ORDER — CEPHALEXIN 500 MG
500 CAPSULE ORAL EVERY 12 HOURS
Refills: 0 | Status: DISCONTINUED | OUTPATIENT
Start: 2019-09-14 | End: 2019-09-15

## 2019-09-14 RX ORDER — LETROZOLE 2.5 MG/1
2.5 TABLET, FILM COATED ORAL DAILY
Refills: 0 | Status: DISCONTINUED | OUTPATIENT
Start: 2019-09-14 | End: 2019-09-17

## 2019-09-14 RX ORDER — ACETAMINOPHEN 500 MG
650 TABLET ORAL EVERY 6 HOURS
Refills: 0 | Status: DISCONTINUED | OUTPATIENT
Start: 2019-09-14 | End: 2019-09-28

## 2019-09-14 RX ORDER — QUETIAPINE FUMARATE 200 MG/1
100 TABLET, FILM COATED ORAL ONCE
Refills: 0 | Status: COMPLETED | OUTPATIENT
Start: 2019-09-14 | End: 2019-09-14

## 2019-09-14 RX ORDER — ACETAMINOPHEN 500 MG
650 TABLET ORAL ONCE
Refills: 0 | Status: COMPLETED | OUTPATIENT
Start: 2019-09-14 | End: 2019-09-14

## 2019-09-14 RX ORDER — QUETIAPINE FUMARATE 200 MG/1
100 TABLET, FILM COATED ORAL AT BEDTIME
Refills: 0 | Status: DISCONTINUED | OUTPATIENT
Start: 2019-09-14 | End: 2019-09-16

## 2019-09-14 RX ORDER — CEPHALEXIN 500 MG
1 CAPSULE ORAL
Qty: 21 | Refills: 0
Start: 2019-09-14 | End: 2019-09-20

## 2019-09-14 RX ORDER — TRAZODONE HCL 50 MG
50 TABLET ORAL AT BEDTIME
Refills: 0 | Status: DISCONTINUED | OUTPATIENT
Start: 2019-09-14 | End: 2019-09-28

## 2019-09-14 RX ORDER — CEFTRIAXONE 500 MG/1
1000 INJECTION, POWDER, FOR SOLUTION INTRAMUSCULAR; INTRAVENOUS ONCE
Refills: 0 | Status: COMPLETED | OUTPATIENT
Start: 2019-09-14 | End: 2019-09-14

## 2019-09-14 RX ADMIN — Medication 50 MILLIGRAM(S): at 23:32

## 2019-09-14 RX ADMIN — QUETIAPINE FUMARATE 100 MILLIGRAM(S): 200 TABLET, FILM COATED ORAL at 15:02

## 2019-09-14 RX ADMIN — QUETIAPINE FUMARATE 100 MILLIGRAM(S): 200 TABLET, FILM COATED ORAL at 23:33

## 2019-09-14 RX ADMIN — Medication 3 MILLIGRAM(S): at 23:33

## 2019-09-14 RX ADMIN — SERTRALINE 100 MILLIGRAM(S): 25 TABLET, FILM COATED ORAL at 15:02

## 2019-09-14 RX ADMIN — Medication 1 MILLIGRAM(S): at 23:33

## 2019-09-14 RX ADMIN — Medication 650 MILLIGRAM(S): at 18:39

## 2019-09-14 RX ADMIN — SODIUM CHLORIDE 1000 MILLILITER(S): 9 INJECTION INTRAMUSCULAR; INTRAVENOUS; SUBCUTANEOUS at 14:16

## 2019-09-14 RX ADMIN — CEFTRIAXONE 1000 MILLIGRAM(S): 500 INJECTION, POWDER, FOR SOLUTION INTRAMUSCULAR; INTRAVENOUS at 13:12

## 2019-09-14 RX ADMIN — Medication 650 MILLIGRAM(S): at 19:18

## 2019-09-14 RX ADMIN — SODIUM CHLORIDE 1000 MILLILITER(S): 9 INJECTION INTRAMUSCULAR; INTRAVENOUS; SUBCUTANEOUS at 13:12

## 2019-09-14 NOTE — H&P ADULT - ASSESSMENT
78 yo female with Hx. of Schizoaffective disorder, who was sent to the ER form [facility] assisted living because of medication refusal , exacerbation of schizoaffective symptoms. EMS report, pt has been refusing medications, refusing to answer questions or speak, reporting that people are following her home and that men are sneaking into her room, removing her cloth in public. In the ER the patient is disoriented, communicating, not c/o pain.   assessment Dx:                       1.UTI                        2.Schizoaffective disorder                       3.breast CA Hx.                           Plan:    admit to Medicine                medications: started on PO Keflex.                VTEP: Heparin                Labs:urine cultures, UA, C/S               Radiology:               Cardiac diagnostics: EKG                Consults: ID, Psychiatry

## 2019-09-14 NOTE — ED ADULT NURSE NOTE - NS PRO PASSIVE SMOKE EXP
1945: Spoke with Maninder Humphries NP Marie Da Silva RN (oncoming nurse) by LAUREN Block (offgoing nurse). Report included the following information SBAR, Kardex, Intake/Output, MAR, Recent Results, Med Rec Status and Cardiac Rhythm NSR. Unknown

## 2019-09-14 NOTE — H&P ADULT - HISTORY OF PRESENT ILLNESS
HPI:The patient is an 80 yo female with Hx. of Schizoaffective disorder, who was sent to the ER form [facility] because of medication refusal , exacerbation of schizoaffective symptoms. EMS report, pt has been refusing medications, refusing to answer questions or speak, reporting that people are following her home and that men are sneaking into her room, removing her cloth in public. In the ER the patient is disoriented, communicating, not c/o pain.     PMHx: Schizophrenia, Breast CA    PSHx: Lumpectomy,     Family Hx: not obtainable secondary to shizophrenia    Social Hx.: not smoking, no alcohol use    ROS:  not obtainable secondary to AMS, schyzpphrenia,   Eyes: no changes in vision    ENT/Mouth: no changes    Cardiovascular: no chest pain    Respiratory: no SOB    Gastrointestinal: no diarrhea, no nausea, no vomiting      Musculoskeletal: no pain    Integumentary: no itching    Neurological: No Headache, no tremor,              Physical Exam: Vital Signs Last 24 Hrs  T(C): 37.5 (14 Sep 2019 19:17), Max: 38 (14 Sep 2019 16:55)  T(F): 99.5 (14 Sep 2019 19:17), Max: 100.4 (14 Sep 2019 16:55)  HR: 107 (14 Sep 2019 19:17) (96 - 113)  BP: 124/62 (14 Sep 2019 19:17) (111/58 - 146/67)  BP(mean): --  RR: 18 (14 Sep 2019 19:17) (16 - 19)  SpO2: 93% (14 Sep 2019 19:17) (93% - 97%)        HEENT: PRRL EOMI    MOUTH/TEETH/GUMS: Clear    NECK: no JVD    LUNGS: Clear    HEART: S1,S2 RR    ABDOMEN: soft nontender    EXTREMITIES:  no pedal edema    MUSCULOSKELETAL: no joint swelling     NEURO: Alert, disoriented, no tremor, no focal signs.    SKIN: no rash    : CVA negative,     Lab:                      12.3   8.11  )-----------( 201      ( 14 Sep 2019 11:35 )             36.4       09-14    133<L>  |  100  |  14  ----------------------------<  123<H>  4.2   |  25  |  0.84    Ca    9.3      14 Sep 2019 11:35    TPro  7.9  /  Alb  3.8  /  TBili  0.6  /  DBili  x   /  AST  20  /  ALT  24  /  AlkPhos  155<H>  09-14    UA : nitrates positive, wbc 26-50. HPI:The patient is an 78 yo female with Hx. of Schizoaffective disorder, who was sent to the ER form [facility] because of medication refusal , exacerbation of schizoaffective symptoms. EMS report, pt has been refusing medications, refusing to answer questions or speak, reporting that people are following her home and that men are sneaking into her room, removing her cloth in public. In the ER the patient is disoriented, communicating, not c/o pain.     PMHx: Schizophrenia, Breast CA    PSHx: Lumpectomy,     Family Hx: not obtainable secondary to shizophrenia    Social Hx.: not smoking, no alcohol use    ROS:  not obtainable secondary to AMS, schyzpphrenia,   Eyes: no changes in vision    ENT/Mouth: no changes    Cardiovascular: no chest pain    Respiratory: no SOB    Gastrointestinal: no diarrhea, no nausea, no vomiting      Musculoskeletal: no pain    Integumentary: no itching    Neurological: No Headache, no tremor,              Physical Exam: Vital Signs Last 24 Hrs  T(C): 37.5 (14 Sep 2019 19:17), Max: 38 (14 Sep 2019 16:55)  T(F): 99.5 (14 Sep 2019 19:17), Max: 100.4 (14 Sep 2019 16:55)  HR: 107 (14 Sep 2019 19:17) (96 - 113)  BP: 124/62 (14 Sep 2019 19:17) (111/58 - 146/67)  BP(mean): --  RR: 18 (14 Sep 2019 19:17) (16 - 19)  SpO2: 93% (14 Sep 2019 19:17) (93% - 97%)        HEENT: PRRL EOMI    MOUTH/TEETH/GUMS: Clear    NECK: no JVD    LUNGS: Clear    HEART: S1,S2 RR    ABDOMEN: soft nontender    EXTREMITIES:  no pedal edema    MUSCULOSKELETAL: no joint swelling     NEURO: Alert, disoriented, no tremor, no focal signs.    SKIN: no rash    : CVA negative,     Lab:                      12.3   8.11  )-----------( 201      ( 14 Sep 2019 11:35 )             36.4       09-14    133<L>  |  100  |  14  ----------------------------<  123<H>  4.2   |  25  |  0.84    Ca    9.3      14 Sep 2019 11:35    TPro  7.9  /  Alb  3.8  /  TBili  0.6  /  DBili  x   /  AST  20  /  ALT  24  /  AlkPhos  155<H>  09-14    UA : nitrates positive, wbc 26-50.     CTH neg.

## 2019-09-14 NOTE — ED PROVIDER NOTE - PROGRESS NOTE DETAILS
Scribe CD for ED attending, Doctor Ferguson. Pt found to have UTI. ceftriaxone given, keflex ordered. Pt awake, alert, not agitated, at baseline, will discharge back.

## 2019-09-14 NOTE — ED PROVIDER NOTE - GASTROINTESTINAL, MLM
Abdomen soft, non-tender, no guarding. Abdomen soft, non-tender, no guarding. No grimace on palpation

## 2019-09-14 NOTE — ED ADULT TRIAGE NOTE - CHIEF COMPLAINT QUOTE
Pt lives in assisted living facility. Hx of schizoaffective disorder. Per EMS report, pt has been refusing medications, refusing to answer questions or speak, reporting that people are following her home and that men are sneaking into her room, removing her clothing in public

## 2019-09-14 NOTE — ED ADULT NURSE NOTE - OBJECTIVE STATEMENT
sent from [facility] assisted living refusing to take meds, refusing to answer questions. Hx schizoaffective disorder. Pt cleaned and changed into clean gown sent from [facility] assisted living refusing to take meds, refusing to answer questions. Hx schizoaffective disorder. Pt found with 3 dirty diapers one on top of other. Pt was cleaned and changed into clean gown, body lathered in lotion and cooperative.

## 2019-09-14 NOTE — ED ADULT NURSE REASSESSMENT NOTE - NS ED NURSE REASSESS COMMENT FT1
pt refusing to leave, states " I don't feel good", c/o new onset cough. Daughter at bedside, pt febrile and Dr. Cortes made aware and at bedside.

## 2019-09-14 NOTE — ED PROVIDER NOTE - OBJECTIVE STATEMENT
78 y/o female with a PMHx of breast cancer, schizophrenia presents to the ED sent from the [facility] for bizarre behavior- pt was refusing to take medications today. Pt with h/o schizoaffective disorder, sent in for exacerbation of sx, including stating that men are sneaking into her room and people are following her. Pt saw psychiatrist last week for these complaints and is scheduled to follow up in 4 weeks. PMD- Dr. Agee. Psych- Dr. Aaron. History obtained from nursing facility records. History limited due to pt AMS.

## 2019-09-14 NOTE — ED ADULT NURSE NOTE - NSIMPLEMENTINTERV_GEN_ALL_ED
Implemented All Fall Risk Interventions:  [place] to call system. Call bell, personal items and telephone within reach. Instruct patient to call for assistance. Room bathroom lighting operational. Non-slip footwear when patient is off stretcher. Physically safe environment: no spills, clutter or unnecessary equipment. Stretcher in lowest position, wheels locked, appropriate side rails in place. Provide visual cue, wrist band, yellow gown, etc. Monitor gait and stability. Monitor for mental status changes and reorient to person, place, and time. Review medications for side effects contributing to fall risk. Reinforce activity limits and safety measures with patient and family.

## 2019-09-14 NOTE — ED PROVIDER NOTE - PATIENT PORTAL LINK FT
You can access the FollowMyHealth Patient Portal offered by [facility] by registering at the following website: [email or web address]. By joining [employer]’s FollowMyHealth portal, you will also be able to view your health information using other applications (apps) compatible with our system.

## 2019-09-14 NOTE — ED ADULT NURSE REASSESSMENT NOTE - NS ED NURSE REASSESS COMMENT FT1
pt A&O x 3, pt is more awake and alert and verbal since arrival to ED. Denies pain, denies SI/HI. Pt states she is tired and has not slept in 2 days because her meds don't let her sleep. safety maintained.

## 2019-09-15 LAB — RAPID RVP RESULT: SIGNIFICANT CHANGE UP

## 2019-09-15 PROCEDURE — 99222 1ST HOSP IP/OBS MODERATE 55: CPT

## 2019-09-15 RX ORDER — CEFUROXIME AXETIL 250 MG
500 TABLET ORAL EVERY 12 HOURS
Refills: 0 | Status: DISCONTINUED | OUTPATIENT
Start: 2019-09-15 | End: 2019-09-17

## 2019-09-15 RX ADMIN — Medication 500 MILLIGRAM(S): at 05:06

## 2019-09-15 RX ADMIN — Medication 200 MILLIGRAM(S): at 17:08

## 2019-09-15 RX ADMIN — Medication 3 MILLIGRAM(S): at 21:14

## 2019-09-15 RX ADMIN — LETROZOLE 2.5 MILLIGRAM(S): 2.5 TABLET, FILM COATED ORAL at 12:22

## 2019-09-15 RX ADMIN — Medication 500 MILLIGRAM(S): at 17:08

## 2019-09-15 RX ADMIN — QUETIAPINE FUMARATE 100 MILLIGRAM(S): 200 TABLET, FILM COATED ORAL at 21:14

## 2019-09-15 RX ADMIN — Medication 1 MILLIGRAM(S): at 05:06

## 2019-09-15 RX ADMIN — SERTRALINE 100 MILLIGRAM(S): 25 TABLET, FILM COATED ORAL at 12:22

## 2019-09-15 RX ADMIN — Medication 50 MILLIGRAM(S): at 21:14

## 2019-09-15 RX ADMIN — Medication 200 MILLIGRAM(S): at 10:18

## 2019-09-15 RX ADMIN — Medication 1 MILLIGRAM(S): at 17:08

## 2019-09-15 NOTE — BEHAVIORAL HEALTH ASSESSMENT NOTE - OTHER
history of psychosis AMS living in supervised housing at nursing home limited cooperation in the setting of mental status likely related to hearing loss and difficulty hearing questions; limited likely due to hearing issues peers limited cooperation possibly due to lack of hearing;  daughter to bring hear aide in; laying in bed, not assessed aimee-likely due to hearing loss

## 2019-09-15 NOTE — BEHAVIORAL HEALTH ASSESSMENT NOTE - HPI (INCLUDE ILLNESS QUALITY, SEVERITY, DURATION, TIMING, CONTEXT, MODIFYING FACTORS, ASSOCIATED SIGNS AND SYMPTOMS)
The patient is a 79 year-old female with history of Schizophrenia, medical history of breast CA,   Per EMS report, pt has been refusing medications, refusing to answer questions or speak, reporting that people are following her home and that men are sneaking into her room, removing her clothing in public    Patient is documented to have been recently treated inpatient for UTI 8/22/19 in present hospital.  Additionally, she is found today to have current UTI. Patient consulted today for dx of Schizophrenia with recent exacerbation in symptoms.    Patient presents distracted and focus on medical issues.  She is difficult to interview and has garbled speech.  Patient has poor recent and remote memory. Patient able to answer simple questions: denies depressed mood, hopelessness, suicidal ideation. Denies manic symptoms. Denies psychotic symptoms including auditory / visual hallucinations. Denies paranoia. No delusions elicited.   No agitation noted on assessment.      s/w daughter, Lea Stone, [phone];  She reported patient has had worsening behaviors since august 2019 admission to [facility] for urinary retention and UTI.  Her medication (Quetiapine) was reduced and now patient with worsening agitation and bizarre behaviors with paranoia.  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  Patient has no  history of SI/P/I;  No history of substance use/abuse.  No legal issues.  Patient's  passed away 8/15/14 (on patient's bday) and she reports this as a stressor for the patient. Daughter to bring up patient's hearing aide to assist with more thorough assessment.

## 2019-09-15 NOTE — BEHAVIORAL HEALTH ASSESSMENT NOTE - SUMMARY
The patient is a 79 year-old female with history of Schizophrenia, medical history of breast CA,   Per EMS report, pt has been refusing medications, refusing to answer questions or speak, reporting that people are following her home and that men are sneaking into her room, removing her clothing in public    Patient is documented to have been recently treated inpatient for UTI 8/22/19 in present hospital.  Additionally, she is found today to have current UTI. Patient consulted today for dx of Schizophrenia with recent exacerbation in symptoms.    Patient presents distracted and focus on medical issues.  She is difficult to interview and has garbled speech.  Patient has poor recent and remote memory. Patient able to answer simple questions: denies depressed mood, hopelessness, suicidal ideation. Denies manic symptoms. Denies psychotic symptoms including auditory / visual hallucinations. Denies paranoia. No delusions elicited.   No agitation noted on assessment.      s/w daughter, Lea Stone, [phone];  She reported patient has had worsening behaviors since august 2019 admission to [facility] for urinary retention and UTI.  Her medication (Quetiapine) was reduced and now patient with worsening agitation and bizarre behaviors with paranoia.  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  Patient has no  history of SI/P/I;  No history of substance use/abuse.  No legal issues.  Patient's  passed away 8/15/14 (on patient's bday) and she reports this as a stressor for the patient. Daughter to bring up patient's hearing aide to assist with more thorough assessment.    Plan:  reassess after daughter brings in hearing aides as patient may be more appropriate;  Residual hearing 30%;    Continue current medications and monitor for improvements in behaviors once compliance established;

## 2019-09-15 NOTE — BEHAVIORAL HEALTH ASSESSMENT NOTE - NSBHCHARTREVIEWVS_PSY_A_CORE FT
Vital Signs Last 24 Hrs  T(C): 37.1 (15 Sep 2019 07:53), Max: 38 (14 Sep 2019 16:55)  T(F): 98.7 (15 Sep 2019 07:53), Max: 100.4 (14 Sep 2019 16:55)  HR: 104 (15 Sep 2019 07:53) (90 - 113)  BP: 142/75 (15 Sep 2019 07:53) (113/58 - 146/67)  BP(mean): --  RR: 18 (15 Sep 2019 07:53) (16 - 19)  SpO2: 96% (15 Sep 2019 07:53) (93% - 97%)

## 2019-09-16 DIAGNOSIS — F20.9 SCHIZOPHRENIA, UNSPECIFIED: ICD-10-CM

## 2019-09-16 LAB
ANION GAP SERPL CALC-SCNC: 7 MMOL/L — SIGNIFICANT CHANGE UP (ref 5–17)
BUN SERPL-MCNC: 20 MG/DL — SIGNIFICANT CHANGE UP (ref 7–23)
CALCIUM SERPL-MCNC: 8.4 MG/DL — LOW (ref 8.5–10.1)
CHLORIDE SERPL-SCNC: 104 MMOL/L — SIGNIFICANT CHANGE UP (ref 96–108)
CO2 SERPL-SCNC: 25 MMOL/L — SIGNIFICANT CHANGE UP (ref 22–31)
CREAT SERPL-MCNC: 0.79 MG/DL — SIGNIFICANT CHANGE UP (ref 0.5–1.3)
GLUCOSE SERPL-MCNC: 99 MG/DL — SIGNIFICANT CHANGE UP (ref 70–99)
HCT VFR BLD CALC: 28.7 % — LOW (ref 34.5–45)
HGB BLD-MCNC: 9.8 G/DL — LOW (ref 11.5–15.5)
MCHC RBC-ENTMCNC: 30 PG — SIGNIFICANT CHANGE UP (ref 27–34)
MCHC RBC-ENTMCNC: 34.1 GM/DL — SIGNIFICANT CHANGE UP (ref 32–36)
MCV RBC AUTO: 87.8 FL — SIGNIFICANT CHANGE UP (ref 80–100)
PLATELET # BLD AUTO: 148 K/UL — LOW (ref 150–400)
POTASSIUM SERPL-MCNC: 4.2 MMOL/L — SIGNIFICANT CHANGE UP (ref 3.5–5.3)
POTASSIUM SERPL-SCNC: 4.2 MMOL/L — SIGNIFICANT CHANGE UP (ref 3.5–5.3)
RBC # BLD: 3.27 M/UL — LOW (ref 3.8–5.2)
RBC # FLD: 12.3 % — SIGNIFICANT CHANGE UP (ref 10.3–14.5)
SODIUM SERPL-SCNC: 136 MMOL/L — SIGNIFICANT CHANGE UP (ref 135–145)
WBC # BLD: 7.09 K/UL — SIGNIFICANT CHANGE UP (ref 3.8–10.5)
WBC # FLD AUTO: 7.09 K/UL — SIGNIFICANT CHANGE UP (ref 3.8–10.5)

## 2019-09-16 PROCEDURE — 99231 SBSQ HOSP IP/OBS SF/LOW 25: CPT

## 2019-09-16 RX ORDER — QUETIAPINE FUMARATE 200 MG/1
200 TABLET, FILM COATED ORAL AT BEDTIME
Refills: 0 | Status: DISCONTINUED | OUTPATIENT
Start: 2019-09-16 | End: 2019-09-17

## 2019-09-16 RX ORDER — LANOLIN ALCOHOL/MO/W.PET/CERES
1 CREAM (GRAM) TOPICAL
Qty: 0 | Refills: 0 | DISCHARGE

## 2019-09-16 RX ORDER — ENOXAPARIN SODIUM 100 MG/ML
40 INJECTION SUBCUTANEOUS DAILY
Refills: 0 | Status: DISCONTINUED | OUTPATIENT
Start: 2019-09-16 | End: 2019-09-28

## 2019-09-16 RX ORDER — QUETIAPINE FUMARATE 200 MG/1
100 TABLET, FILM COATED ORAL
Refills: 0 | Status: DISCONTINUED | OUTPATIENT
Start: 2019-09-16 | End: 2019-09-17

## 2019-09-16 RX ADMIN — ENOXAPARIN SODIUM 40 MILLIGRAM(S): 100 INJECTION SUBCUTANEOUS at 12:11

## 2019-09-16 RX ADMIN — Medication 1 MILLIGRAM(S): at 05:21

## 2019-09-16 RX ADMIN — Medication 200 MILLIGRAM(S): at 12:12

## 2019-09-16 RX ADMIN — Medication 6 MILLIGRAM(S): at 22:30

## 2019-09-16 RX ADMIN — LETROZOLE 2.5 MILLIGRAM(S): 2.5 TABLET, FILM COATED ORAL at 12:11

## 2019-09-16 RX ADMIN — Medication 200 MILLIGRAM(S): at 01:10

## 2019-09-16 RX ADMIN — Medication 200 MILLIGRAM(S): at 18:10

## 2019-09-16 RX ADMIN — Medication 500 MILLIGRAM(S): at 18:10

## 2019-09-16 RX ADMIN — Medication 1 MILLIGRAM(S): at 18:10

## 2019-09-16 RX ADMIN — Medication 500 MILLIGRAM(S): at 05:21

## 2019-09-16 RX ADMIN — SERTRALINE 100 MILLIGRAM(S): 25 TABLET, FILM COATED ORAL at 12:11

## 2019-09-16 RX ADMIN — QUETIAPINE FUMARATE 150 MILLIGRAM(S): 200 TABLET, FILM COATED ORAL at 22:30

## 2019-09-16 NOTE — PROGRESS NOTE BEHAVIORAL HEALTH - NSBHCONSULTMEDS_PSY_A_CORE FT
Seroquel 100 mg in the morning and 150 mg at bedtime.  Zoloft 150 mg in the morning  Trazodone 50 mg at bedtime for insomnia

## 2019-09-16 NOTE — PROGRESS NOTE BEHAVIORAL HEALTH - NSBHCONSULTFOLLOWAFTERCARE_PSY_A_CORE FT
Patient can continue current psychotropic management: Seroquel can be increased to 200 mg at bedtime if persistent mood lability is present.

## 2019-09-16 NOTE — PROGRESS NOTE BEHAVIORAL HEALTH - NSBHCHARTREVIEWVS_PSY_A_CORE FT
Vital Signs Last 24 Hrs  T(C): 36.5 (16 Sep 2019 11:33), Max: 37.6 (15 Sep 2019 20:34)  T(F): 97.7 (16 Sep 2019 11:33), Max: 99.7 (15 Sep 2019 20:34)  HR: 93 (16 Sep 2019 11:33) (82 - 93)  BP: 140/59 (16 Sep 2019 11:33) (110/57 - 140/59)  BP(mean): --  RR: 18 (16 Sep 2019 05:52) (18 - 18)  SpO2: 97% (16 Sep 2019 05:52) (97% - 97%)

## 2019-09-16 NOTE — PROGRESS NOTE BEHAVIORAL HEALTH - RISK ASSESSMENT
LOW RISK    ACUTE RISK FACTORS: medical comorbidities, acute medical concerns, recent AMS, UTI    CHRONIC RISK FACTORS: schizophrenia, ? Intellectual Disability, medical comorbidities

## 2019-09-16 NOTE — PROGRESS NOTE BEHAVIORAL HEALTH - NSBHFUPINTERVALHXFT_PSY_A_CORE
Patient presents with speech aphasia secondary to being ?edentulous v mental status change. Patient presents as though to understand what is being said / asked of her however has difficulty with communication expression at this time. Patient perseverative on wanting to sleep / needing something for sleep. As per RN, patient slept last night. Patient denying other concerns, asking as though she is sleeping when asked other psychiatric questions.

## 2019-09-16 NOTE — PROGRESS NOTE BEHAVIORAL HEALTH - SUMMARY
The patient is a 79 year-old female with history of Schizophrenia, medical history of breast CA,   Per EMS report, pt has been refusing medications, refusing to answer questions or speak, reporting that people are following her home and that men are sneaking into her room, removing her clothing in public    Patient is documented to have been recently treated inpatient for UTI 8/22/19 and now recently again on this admission.  Additionally, she is found today to have current UTI. Patient consulted today for dx of Schizophrenia with recent exacerbation in symptoms.    Patient is difficult to interview and has garbled speech.  Patient has poor recent and remote memory. Patient not answering psychiatric evaluation questions at this time.     As per collateral, daughter, Lea Stone, [phone] (chart review) Patient has worsening behaviors in the setting of decreased Seroquel during last admission 8.2019 at  for medical treatment (urinary retention and UTI). Speech has worsened over the past 12 - 18 months Hence. not acute. Of note, current medications appear appropriate. No medication changes to be made until medically optimized in the setting of presence of UTI as this medical condition can exacerbated psychiatric symptoms.     PLAN  #Reassess after daughter brings in hearing aides as patient may be more appropriate;  Residual hearing 30%;    #Continue current medications and monitor for improvements in behaviors once compliance established     Differential: Schizophrenia  Intellectual delay.

## 2019-09-16 NOTE — PHYSICAL THERAPY INITIAL EVALUATION ADULT - PERTINENT HX OF CURRENT PROBLEM, REHAB EVAL
80 yo female with Hx. of Schizoaffective disorder, who was sent to the ER form [facility] assisted living because of medication refusal , exacerbation of schizoaffective symptoms. EMS report, pt has been refusing medications, refusing to answer questions or speak, reporting that people are following her home and that men are sneaking into her room, removing her cloth in public. In the ER the patient is disoriented, communicating, not c/o pain.

## 2019-09-16 NOTE — CDI QUERY NOTE - NSCDIOTHERTXTBX_GEN_ALL_CORE_HH
This patient was admitted with AMS and diagnosed with a UTI.    Documentation reveals:    Consult Note Adult-Infectious Disease Attending 9-15-19 @ 13:41  ..was admitted on 9/14 for increased confusion.  ..Probable UTI. Encephalopathy..    Progress Note Adult-Hospitalist Attending 0-15-19 @ 13:12  . In the ER the patient is disoriented, communicating, not c/o pain.     Are the above clinical indicators indicative of a further diagnosis?  A) Metabolic Encephalopathy likely related to UTI, now resolving.  B) Toxic Encephalopathy   C) Other type of Encephalopathy.  D) No clinical indication of encephalopathy.

## 2019-09-16 NOTE — PROGRESS NOTE BEHAVIORAL HEALTH - OTHER
limited cooperation possibly due to lack of hearing;  daughter to bring hear aide in; laying in bed, not assessed aimee-likely due to hearing loss limited cooperation in the setting of mental status likely related to hearing loss and difficulty hearing questions; limited likely due to hearing issues

## 2019-09-17 ENCOUNTER — APPOINTMENT (OUTPATIENT)
Dept: UROGYNECOLOGY | Facility: CLINIC | Age: 79
End: 2019-09-17

## 2019-09-17 LAB
ANION GAP SERPL CALC-SCNC: 13 MMOL/L — SIGNIFICANT CHANGE UP (ref 5–17)
ANION GAP SERPL CALC-SCNC: 9 MMOL/L — SIGNIFICANT CHANGE UP (ref 5–17)
BASE EXCESS BLDA CALC-SCNC: -5.9 MMOL/L — LOW (ref -2–2)
BLOOD GAS COMMENTS ARTERIAL: SIGNIFICANT CHANGE UP
BUN SERPL-MCNC: 14 MG/DL — SIGNIFICANT CHANGE UP (ref 7–23)
BUN SERPL-MCNC: 16 MG/DL — SIGNIFICANT CHANGE UP (ref 7–23)
CALCIUM SERPL-MCNC: 6.8 MG/DL — LOW (ref 8.5–10.1)
CALCIUM SERPL-MCNC: 8.1 MG/DL — LOW (ref 8.5–10.1)
CHLORIDE SERPL-SCNC: 95 MMOL/L — LOW (ref 96–108)
CHLORIDE SERPL-SCNC: 98 MMOL/L — SIGNIFICANT CHANGE UP (ref 96–108)
CO2 SERPL-SCNC: 18 MMOL/L — LOW (ref 22–31)
CO2 SERPL-SCNC: 24 MMOL/L — SIGNIFICANT CHANGE UP (ref 22–31)
CREAT SERPL-MCNC: 0.7 MG/DL — SIGNIFICANT CHANGE UP (ref 0.5–1.3)
CREAT SERPL-MCNC: 0.71 MG/DL — SIGNIFICANT CHANGE UP (ref 0.5–1.3)
GAS PNL BLDA: SIGNIFICANT CHANGE UP
GLUCOSE SERPL-MCNC: 167 MG/DL — HIGH (ref 70–99)
GLUCOSE SERPL-MCNC: 94 MG/DL — SIGNIFICANT CHANGE UP (ref 70–99)
HCO3 BLDA-SCNC: 19 MMOL/L — LOW (ref 21–29)
HCT VFR BLD CALC: 28.8 % — LOW (ref 34.5–45)
HGB BLD-MCNC: 9.9 G/DL — LOW (ref 11.5–15.5)
MAGNESIUM SERPL-MCNC: 1.6 MG/DL — SIGNIFICANT CHANGE UP (ref 1.6–2.6)
MCHC RBC-ENTMCNC: 29.7 PG — SIGNIFICANT CHANGE UP (ref 27–34)
MCHC RBC-ENTMCNC: 34.4 GM/DL — SIGNIFICANT CHANGE UP (ref 32–36)
MCV RBC AUTO: 86.5 FL — SIGNIFICANT CHANGE UP (ref 80–100)
PCO2 BLDA: 39 MMHG — SIGNIFICANT CHANGE UP (ref 32–46)
PH BLDA: 7.32 — LOW (ref 7.35–7.45)
PLATELET # BLD AUTO: 123 K/UL — LOW (ref 150–400)
PO2 BLDA: 88 MMHG — SIGNIFICANT CHANGE UP (ref 74–108)
POTASSIUM SERPL-MCNC: 3.6 MMOL/L — SIGNIFICANT CHANGE UP (ref 3.5–5.3)
POTASSIUM SERPL-MCNC: 3.7 MMOL/L — SIGNIFICANT CHANGE UP (ref 3.5–5.3)
POTASSIUM SERPL-SCNC: 3.6 MMOL/L — SIGNIFICANT CHANGE UP (ref 3.5–5.3)
POTASSIUM SERPL-SCNC: 3.7 MMOL/L — SIGNIFICANT CHANGE UP (ref 3.5–5.3)
RBC # BLD: 3.33 M/UL — LOW (ref 3.8–5.2)
RBC # FLD: 12.1 % — SIGNIFICANT CHANGE UP (ref 10.3–14.5)
SAO2 % BLDA: 95 % — SIGNIFICANT CHANGE UP (ref 92–96)
SODIUM SERPL-SCNC: 128 MMOL/L — LOW (ref 135–145)
SODIUM SERPL-SCNC: 129 MMOL/L — LOW (ref 135–145)
WBC # BLD: 5.02 K/UL — SIGNIFICANT CHANGE UP (ref 3.8–10.5)
WBC # FLD AUTO: 5.02 K/UL — SIGNIFICANT CHANGE UP (ref 3.8–10.5)

## 2019-09-17 PROCEDURE — 71045 X-RAY EXAM CHEST 1 VIEW: CPT | Mod: 26

## 2019-09-17 PROCEDURE — 93010 ELECTROCARDIOGRAM REPORT: CPT

## 2019-09-17 RX ORDER — PANTOPRAZOLE SODIUM 20 MG/1
40 TABLET, DELAYED RELEASE ORAL DAILY
Refills: 0 | Status: DISCONTINUED | OUTPATIENT
Start: 2019-09-17 | End: 2019-09-23

## 2019-09-17 RX ORDER — PIPERACILLIN AND TAZOBACTAM 4; .5 G/20ML; G/20ML
3.38 INJECTION, POWDER, LYOPHILIZED, FOR SOLUTION INTRAVENOUS ONCE
Refills: 0 | Status: COMPLETED | OUTPATIENT
Start: 2019-09-17 | End: 2019-09-17

## 2019-09-17 RX ORDER — ASPIRIN/CALCIUM CARB/MAGNESIUM 324 MG
81 TABLET ORAL DAILY
Refills: 0 | Status: DISCONTINUED | OUTPATIENT
Start: 2019-09-17 | End: 2019-09-28

## 2019-09-17 RX ORDER — FENTANYL CITRATE 50 UG/ML
50 INJECTION INTRAVENOUS
Refills: 0 | Status: DISCONTINUED | OUTPATIENT
Start: 2019-09-17 | End: 2019-09-20

## 2019-09-17 RX ORDER — PIPERACILLIN AND TAZOBACTAM 4; .5 G/20ML; G/20ML
3.38 INJECTION, POWDER, LYOPHILIZED, FOR SOLUTION INTRAVENOUS EVERY 8 HOURS
Refills: 0 | Status: COMPLETED | OUTPATIENT
Start: 2019-09-17 | End: 2019-09-24

## 2019-09-17 RX ORDER — SODIUM CHLORIDE 9 MG/ML
2000 INJECTION INTRAMUSCULAR; INTRAVENOUS; SUBCUTANEOUS ONCE
Refills: 0 | Status: COMPLETED | OUTPATIENT
Start: 2019-09-17 | End: 2019-09-17

## 2019-09-17 RX ORDER — SODIUM CHLORIDE 9 MG/ML
1000 INJECTION INTRAMUSCULAR; INTRAVENOUS; SUBCUTANEOUS
Refills: 0 | Status: DISCONTINUED | OUTPATIENT
Start: 2019-09-17 | End: 2019-09-20

## 2019-09-17 RX ORDER — PHENYLEPHRINE HYDROCHLORIDE 10 MG/ML
0.1 INJECTION INTRAVENOUS
Qty: 40 | Refills: 0 | Status: DISCONTINUED | OUTPATIENT
Start: 2019-09-17 | End: 2019-09-23

## 2019-09-17 RX ORDER — CHLORHEXIDINE GLUCONATE 213 G/1000ML
15 SOLUTION TOPICAL EVERY 12 HOURS
Refills: 0 | Status: DISCONTINUED | OUTPATIENT
Start: 2019-09-17 | End: 2019-09-28

## 2019-09-17 RX ORDER — PROPOFOL 10 MG/ML
10 INJECTION, EMULSION INTRAVENOUS
Qty: 1000 | Refills: 0 | Status: DISCONTINUED | OUTPATIENT
Start: 2019-09-17 | End: 2019-09-23

## 2019-09-17 RX ORDER — ACETAMINOPHEN 500 MG
650 TABLET ORAL EVERY 6 HOURS
Refills: 0 | Status: COMPLETED | OUTPATIENT
Start: 2019-09-17 | End: 2019-09-18

## 2019-09-17 RX ADMIN — PIPERACILLIN AND TAZOBACTAM 25 GRAM(S): 4; .5 INJECTION, POWDER, LYOPHILIZED, FOR SOLUTION INTRAVENOUS at 22:19

## 2019-09-17 RX ADMIN — Medication 500 MILLIGRAM(S): at 05:43

## 2019-09-17 RX ADMIN — Medication 650 MILLIGRAM(S): at 20:35

## 2019-09-17 RX ADMIN — Medication 1 MILLIGRAM(S): at 05:46

## 2019-09-17 RX ADMIN — FENTANYL CITRATE 50 MICROGRAM(S): 50 INJECTION INTRAVENOUS at 22:09

## 2019-09-17 RX ADMIN — Medication 650 MILLIGRAM(S): at 20:21

## 2019-09-17 RX ADMIN — SERTRALINE 150 MILLIGRAM(S): 25 TABLET, FILM COATED ORAL at 11:37

## 2019-09-17 RX ADMIN — FENTANYL CITRATE 50 MICROGRAM(S): 50 INJECTION INTRAVENOUS at 22:23

## 2019-09-17 RX ADMIN — PIPERACILLIN AND TAZOBACTAM 200 GRAM(S): 4; .5 INJECTION, POWDER, LYOPHILIZED, FOR SOLUTION INTRAVENOUS at 19:14

## 2019-09-17 RX ADMIN — SODIUM CHLORIDE 2000 MILLILITER(S): 9 INJECTION INTRAMUSCULAR; INTRAVENOUS; SUBCUTANEOUS at 20:22

## 2019-09-17 RX ADMIN — SODIUM CHLORIDE 75 MILLILITER(S): 9 INJECTION INTRAMUSCULAR; INTRAVENOUS; SUBCUTANEOUS at 22:29

## 2019-09-17 RX ADMIN — Medication 1 APPLICATION(S): at 22:15

## 2019-09-17 RX ADMIN — PHENYLEPHRINE HYDROCHLORIDE 2.72 MICROGRAM(S)/KG/MIN: 10 INJECTION INTRAVENOUS at 23:38

## 2019-09-17 RX ADMIN — ENOXAPARIN SODIUM 40 MILLIGRAM(S): 100 INJECTION SUBCUTANEOUS at 11:36

## 2019-09-17 RX ADMIN — Medication 650 MILLIGRAM(S): at 20:09

## 2019-09-17 RX ADMIN — LETROZOLE 2.5 MILLIGRAM(S): 2.5 TABLET, FILM COATED ORAL at 11:37

## 2019-09-17 RX ADMIN — Medication 200 MILLIGRAM(S): at 11:36

## 2019-09-17 RX ADMIN — FENTANYL CITRATE 50 MICROGRAM(S): 50 INJECTION INTRAVENOUS at 20:08

## 2019-09-17 RX ADMIN — Medication 200 MILLIGRAM(S): at 03:29

## 2019-09-17 RX ADMIN — FENTANYL CITRATE 50 MICROGRAM(S): 50 INJECTION INTRAVENOUS at 20:23

## 2019-09-17 RX ADMIN — QUETIAPINE FUMARATE 100 MILLIGRAM(S): 200 TABLET, FILM COATED ORAL at 05:44

## 2019-09-17 NOTE — PROVIDER CONTACT NOTE (CHANGE IN STATUS NOTIFICATION) - ASSESSMENT
Code Blue called at 17:14 with Code team responding at 17:16 with first amp of epi administered at that time.  Compressions continued with intubation.  Two more amps of epi given with pt. heart rhythm responding.  CXR performed.  Food contents removed from pt. mouth.  Pt. transferred to CCU with report given to RN.

## 2019-09-17 NOTE — PROVIDER CONTACT NOTE (CHANGE IN STATUS NOTIFICATION) - BACKGROUND
Pt. is an 79 year old female that came in with a UTI.  Pt. has had a cough, cxr was negative.  Pt. has hx of schizophrenia, psych saw and pt. would not talk with them.  Pt. alert and confused.

## 2019-09-17 NOTE — CHART NOTE - NSCHARTNOTEFT_GEN_A_CORE
Code Blue Note    Code Blue called 17:14 as patient was found unresponsive and CPR initiated. ICU team at bedside. ACLS protocol followed, epi x3 given, patient achieved ROSC at 17:21. Patient intubated and transferred to CCU care. Daughter (Lea Stone- [phone] or [phone]) contacted and made aware. Patient found to have large piece of bread in back of her throat- keep patient NPO for now. Appreciate CCU recommendations. Would recommend speech and swallow eval prior to resuming diet.

## 2019-09-17 NOTE — CHART NOTE - NSCHARTNOTEFT_GEN_A_CORE
RRT called at 5:14 pm after patient found at 5:12 pm unresponsive and pulseless. As per RN, patient last seen at about 3:45 pm with no reported issues.   CPR was started, first epinephrine given at 5:16 and pulse obtained at 5:21 pm after 3rd epinephrine given.   Difficulty passing scope during code, large food bolus obtained after suctioning    When pulse returned, SBP was 180-190    Physical Exam  General- unresponsive  Extremities- pulseless  Skin- cold    A/P-78 yo female with Hx. of Schizoaffective disorder, who was sent to the ER form [facility] because of medication refusal , exacerbation of schizoaffective symptoms    #Unresponsive 2/2 obstruction due to food bolus obtained during suction     -NPO  -Speech and swallow consult   -Transfer to CCU  -CXR STAT  -Further management as per primary team    Case was d/w RRT also present at bedside, including Dr. Vivar and Dr. Ornelas RRT called at 5:14 pm after patient found at 5:12 pm unresponsive and pulseless. As per RN, patient last seen at about 3:45 pm with no reported issues.   CPR was started, first epinephrine given at 5:16 and pulse obtained at 5:21 pm after 3rd epinephrine given.   Difficulty passing scope during code, large food bolus obtained after suctioning    When pulse returned, SBP was 180-190    Physical Exam  General- unresponsive  Extremities- pulseless  Skin- cold    A/P-78 yo female with Hx. of Schizoaffective disorder, who was sent to the ER form [facility] because of medication refusal , exacerbation of schizoaffective symptoms    #Unresponsive 2/2 obstruction due to food bolus obtained during suction     -NPO  -Speech and swallow consult   -Transfer to CCU  -CXR STAT  -Aspiration precautions  -Further management as per primary team    Case was d/w RRT also present at bedside, including Dr. Vivar and Dr. Ornelas

## 2019-09-17 NOTE — PROVIDER CONTACT NOTE (CHANGE IN STATUS NOTIFICATION) - SITUATION
Pt. was found to be unresponsive and pulseless by Nursing Assistant and RN.  Code Blue was called, chest compressions administered, ambu bag used.

## 2019-09-18 LAB
ANION GAP SERPL CALC-SCNC: 11 MMOL/L — SIGNIFICANT CHANGE UP (ref 5–17)
ANION GAP SERPL CALC-SCNC: 11 MMOL/L — SIGNIFICANT CHANGE UP (ref 5–17)
ANION GAP SERPL CALC-SCNC: 16 MMOL/L — SIGNIFICANT CHANGE UP (ref 5–17)
ANION GAP SERPL CALC-SCNC: 7 MMOL/L — SIGNIFICANT CHANGE UP (ref 5–17)
BASOPHILS # BLD AUTO: 0 K/UL — SIGNIFICANT CHANGE UP (ref 0–0.2)
BASOPHILS # BLD AUTO: 0.05 K/UL — SIGNIFICANT CHANGE UP (ref 0–0.2)
BASOPHILS NFR BLD AUTO: 0 % — SIGNIFICANT CHANGE UP (ref 0–2)
BASOPHILS NFR BLD AUTO: 0.2 % — SIGNIFICANT CHANGE UP (ref 0–2)
BUN SERPL-MCNC: 15 MG/DL — SIGNIFICANT CHANGE UP (ref 7–23)
BUN SERPL-MCNC: 16 MG/DL — SIGNIFICANT CHANGE UP (ref 7–23)
BUN SERPL-MCNC: 16 MG/DL — SIGNIFICANT CHANGE UP (ref 7–23)
BUN SERPL-MCNC: 17 MG/DL — SIGNIFICANT CHANGE UP (ref 7–23)
CALCIUM SERPL-MCNC: 6.9 MG/DL — LOW (ref 8.5–10.1)
CALCIUM SERPL-MCNC: 7.4 MG/DL — LOW (ref 8.5–10.1)
CALCIUM SERPL-MCNC: 7.5 MG/DL — LOW (ref 8.5–10.1)
CALCIUM SERPL-MCNC: 7.6 MG/DL — LOW (ref 8.5–10.1)
CHLORIDE SERPL-SCNC: 100 MMOL/L — SIGNIFICANT CHANGE UP (ref 96–108)
CHLORIDE SERPL-SCNC: 100 MMOL/L — SIGNIFICANT CHANGE UP (ref 96–108)
CHLORIDE SERPL-SCNC: 101 MMOL/L — SIGNIFICANT CHANGE UP (ref 96–108)
CHLORIDE SERPL-SCNC: 102 MMOL/L — SIGNIFICANT CHANGE UP (ref 96–108)
CHLORIDE UR-SCNC: 45 MMOL/L — SIGNIFICANT CHANGE UP
CO2 SERPL-SCNC: 13 MMOL/L — LOW (ref 22–31)
CO2 SERPL-SCNC: 16 MMOL/L — LOW (ref 22–31)
CO2 SERPL-SCNC: 19 MMOL/L — LOW (ref 22–31)
CO2 SERPL-SCNC: 19 MMOL/L — LOW (ref 22–31)
CORTIS AM PEAK SERPL-MCNC: 31.3 UG/DL — HIGH (ref 6–18.4)
CREAT SERPL-MCNC: 0.58 MG/DL — SIGNIFICANT CHANGE UP (ref 0.5–1.3)
CREAT SERPL-MCNC: 0.62 MG/DL — SIGNIFICANT CHANGE UP (ref 0.5–1.3)
CREAT SERPL-MCNC: 0.66 MG/DL — SIGNIFICANT CHANGE UP (ref 0.5–1.3)
CREAT SERPL-MCNC: 0.75 MG/DL — SIGNIFICANT CHANGE UP (ref 0.5–1.3)
EOSINOPHIL # BLD AUTO: 0 K/UL — SIGNIFICANT CHANGE UP (ref 0–0.5)
EOSINOPHIL # BLD AUTO: 0 K/UL — SIGNIFICANT CHANGE UP (ref 0–0.5)
EOSINOPHIL NFR BLD AUTO: 0 % — SIGNIFICANT CHANGE UP (ref 0–6)
EOSINOPHIL NFR BLD AUTO: 0 % — SIGNIFICANT CHANGE UP (ref 0–6)
GLUCOSE SERPL-MCNC: 142 MG/DL — HIGH (ref 70–99)
GLUCOSE SERPL-MCNC: 68 MG/DL — LOW (ref 70–99)
GLUCOSE SERPL-MCNC: 83 MG/DL — SIGNIFICANT CHANGE UP (ref 70–99)
GLUCOSE SERPL-MCNC: 93 MG/DL — SIGNIFICANT CHANGE UP (ref 70–99)
HCT VFR BLD CALC: 33.7 % — LOW (ref 34.5–45)
HCT VFR BLD CALC: 34.3 % — LOW (ref 34.5–45)
HCT VFR BLD CALC: 35.7 % — SIGNIFICANT CHANGE UP (ref 34.5–45)
HGB BLD-MCNC: 11.4 G/DL — LOW (ref 11.5–15.5)
HGB BLD-MCNC: 11.6 G/DL — SIGNIFICANT CHANGE UP (ref 11.5–15.5)
HGB BLD-MCNC: 12.2 G/DL — SIGNIFICANT CHANGE UP (ref 11.5–15.5)
IMM GRANULOCYTES NFR BLD AUTO: 1.1 % — SIGNIFICANT CHANGE UP (ref 0–1.5)
INR BLD: 1.31 RATIO — HIGH (ref 0.88–1.16)
LYMPHOCYTES # BLD AUTO: 0.27 K/UL — LOW (ref 1–3.3)
LYMPHOCYTES # BLD AUTO: 0.8 K/UL — LOW (ref 1–3.3)
LYMPHOCYTES # BLD AUTO: 2 % — LOW (ref 13–44)
LYMPHOCYTES # BLD AUTO: 3.9 % — LOW (ref 13–44)
MAGNESIUM SERPL-MCNC: 1.6 MG/DL — SIGNIFICANT CHANGE UP (ref 1.6–2.6)
MAGNESIUM SERPL-MCNC: 1.7 MG/DL — SIGNIFICANT CHANGE UP (ref 1.6–2.6)
MCHC RBC-ENTMCNC: 29.4 PG — SIGNIFICANT CHANGE UP (ref 27–34)
MCHC RBC-ENTMCNC: 29.5 PG — SIGNIFICANT CHANGE UP (ref 27–34)
MCHC RBC-ENTMCNC: 29.7 PG — SIGNIFICANT CHANGE UP (ref 27–34)
MCHC RBC-ENTMCNC: 33.2 GM/DL — SIGNIFICANT CHANGE UP (ref 32–36)
MCHC RBC-ENTMCNC: 34.2 GM/DL — SIGNIFICANT CHANGE UP (ref 32–36)
MCHC RBC-ENTMCNC: 34.4 GM/DL — SIGNIFICANT CHANGE UP (ref 32–36)
MCV RBC AUTO: 86 FL — SIGNIFICANT CHANGE UP (ref 80–100)
MCV RBC AUTO: 86.4 FL — SIGNIFICANT CHANGE UP (ref 80–100)
MCV RBC AUTO: 88.6 FL — SIGNIFICANT CHANGE UP (ref 80–100)
MONOCYTES # BLD AUTO: 0.55 K/UL — SIGNIFICANT CHANGE UP (ref 0–0.9)
MONOCYTES # BLD AUTO: 0.89 K/UL — SIGNIFICANT CHANGE UP (ref 0–0.9)
MONOCYTES NFR BLD AUTO: 4 % — SIGNIFICANT CHANGE UP (ref 2–14)
MONOCYTES NFR BLD AUTO: 4.3 % — SIGNIFICANT CHANGE UP (ref 2–14)
NEUTROPHILS # BLD AUTO: 12.9 K/UL — HIGH (ref 1.8–7.4)
NEUTROPHILS # BLD AUTO: 18.64 K/UL — HIGH (ref 1.8–7.4)
NEUTROPHILS NFR BLD AUTO: 78 % — HIGH (ref 43–77)
NEUTROPHILS NFR BLD AUTO: 90.5 % — HIGH (ref 43–77)
NRBC # BLD: SIGNIFICANT CHANGE UP /100 WBCS (ref 0–0)
OSMOLALITY UR: 342 MOSM/KG — SIGNIFICANT CHANGE UP (ref 50–1200)
PHOSPHATE SERPL-MCNC: 3.4 MG/DL — SIGNIFICANT CHANGE UP (ref 2.5–4.5)
PLATELET # BLD AUTO: 164 K/UL — SIGNIFICANT CHANGE UP (ref 150–400)
PLATELET # BLD AUTO: 172 K/UL — SIGNIFICANT CHANGE UP (ref 150–400)
PLATELET # BLD AUTO: 205 K/UL — SIGNIFICANT CHANGE UP (ref 150–400)
POTASSIUM SERPL-MCNC: 3.8 MMOL/L — SIGNIFICANT CHANGE UP (ref 3.5–5.3)
POTASSIUM SERPL-MCNC: 4.3 MMOL/L — SIGNIFICANT CHANGE UP (ref 3.5–5.3)
POTASSIUM SERPL-MCNC: 4.3 MMOL/L — SIGNIFICANT CHANGE UP (ref 3.5–5.3)
POTASSIUM SERPL-MCNC: 4.4 MMOL/L — SIGNIFICANT CHANGE UP (ref 3.5–5.3)
POTASSIUM SERPL-SCNC: 3.8 MMOL/L — SIGNIFICANT CHANGE UP (ref 3.5–5.3)
POTASSIUM SERPL-SCNC: 4.3 MMOL/L — SIGNIFICANT CHANGE UP (ref 3.5–5.3)
POTASSIUM SERPL-SCNC: 4.3 MMOL/L — SIGNIFICANT CHANGE UP (ref 3.5–5.3)
POTASSIUM SERPL-SCNC: 4.4 MMOL/L — SIGNIFICANT CHANGE UP (ref 3.5–5.3)
PROTHROM AB SERPL-ACNC: 14.7 SEC — HIGH (ref 10–12.9)
RBC # BLD: 3.87 M/UL — SIGNIFICANT CHANGE UP (ref 3.8–5.2)
RBC # BLD: 3.9 M/UL — SIGNIFICANT CHANGE UP (ref 3.8–5.2)
RBC # BLD: 4.15 M/UL — SIGNIFICANT CHANGE UP (ref 3.8–5.2)
RBC # FLD: 12.3 % — SIGNIFICANT CHANGE UP (ref 10.3–14.5)
RBC # FLD: 12.4 % — SIGNIFICANT CHANGE UP (ref 10.3–14.5)
RBC # FLD: 12.5 % — SIGNIFICANT CHANGE UP (ref 10.3–14.5)
SODIUM SERPL-SCNC: 127 MMOL/L — LOW (ref 135–145)
SODIUM SERPL-SCNC: 129 MMOL/L — LOW (ref 135–145)
SODIUM SERPL-SCNC: 129 MMOL/L — LOW (ref 135–145)
SODIUM SERPL-SCNC: 130 MMOL/L — LOW (ref 135–145)
SODIUM UR-SCNC: 43 MMOL/L — SIGNIFICANT CHANGE UP
TROPONIN I SERPL-MCNC: 0.41 NG/ML — HIGH (ref 0.01–0.04)
TROPONIN I SERPL-MCNC: 0.58 NG/ML — HIGH (ref 0.01–0.04)
TSH SERPL-MCNC: 0.69 UU/ML — SIGNIFICANT CHANGE UP (ref 0.34–4.82)
WBC # BLD: 13.72 K/UL — HIGH (ref 3.8–10.5)
WBC # BLD: 14.05 K/UL — HIGH (ref 3.8–10.5)
WBC # BLD: 20.6 K/UL — HIGH (ref 3.8–10.5)
WBC # FLD AUTO: 13.72 K/UL — HIGH (ref 3.8–10.5)
WBC # FLD AUTO: 14.05 K/UL — HIGH (ref 3.8–10.5)
WBC # FLD AUTO: 20.6 K/UL — HIGH (ref 3.8–10.5)

## 2019-09-18 PROCEDURE — 93306 TTE W/DOPPLER COMPLETE: CPT | Mod: 26

## 2019-09-18 RX ORDER — DEXTROSE 50 % IN WATER 50 %
50 SYRINGE (ML) INTRAVENOUS ONCE
Refills: 0 | Status: COMPLETED | OUTPATIENT
Start: 2019-09-18 | End: 2019-09-18

## 2019-09-18 RX ADMIN — PIPERACILLIN AND TAZOBACTAM 25 GRAM(S): 4; .5 INJECTION, POWDER, LYOPHILIZED, FOR SOLUTION INTRAVENOUS at 06:17

## 2019-09-18 RX ADMIN — FENTANYL CITRATE 50 MICROGRAM(S): 50 INJECTION INTRAVENOUS at 06:02

## 2019-09-18 RX ADMIN — PROPOFOL 4.36 MICROGRAM(S)/KG/MIN: 10 INJECTION, EMULSION INTRAVENOUS at 22:29

## 2019-09-18 RX ADMIN — FENTANYL CITRATE 50 MICROGRAM(S): 50 INJECTION INTRAVENOUS at 16:43

## 2019-09-18 RX ADMIN — FENTANYL CITRATE 50 MICROGRAM(S): 50 INJECTION INTRAVENOUS at 00:10

## 2019-09-18 RX ADMIN — PHENYLEPHRINE HYDROCHLORIDE 2.72 MICROGRAM(S)/KG/MIN: 10 INJECTION INTRAVENOUS at 06:24

## 2019-09-18 RX ADMIN — Medication 81 MILLIGRAM(S): at 11:37

## 2019-09-18 RX ADMIN — PROPOFOL 4.36 MICROGRAM(S)/KG/MIN: 10 INJECTION, EMULSION INTRAVENOUS at 00:11

## 2019-09-18 RX ADMIN — PIPERACILLIN AND TAZOBACTAM 25 GRAM(S): 4; .5 INJECTION, POWDER, LYOPHILIZED, FOR SOLUTION INTRAVENOUS at 21:45

## 2019-09-18 RX ADMIN — FENTANYL CITRATE 50 MICROGRAM(S): 50 INJECTION INTRAVENOUS at 20:22

## 2019-09-18 RX ADMIN — Medication 1 APPLICATION(S): at 00:15

## 2019-09-18 RX ADMIN — Medication 1 MILLIGRAM(S): at 22:29

## 2019-09-18 RX ADMIN — PIPERACILLIN AND TAZOBACTAM 25 GRAM(S): 4; .5 INJECTION, POWDER, LYOPHILIZED, FOR SOLUTION INTRAVENOUS at 13:06

## 2019-09-18 RX ADMIN — Medication 1 APPLICATION(S): at 03:02

## 2019-09-18 RX ADMIN — Medication 650 MILLIGRAM(S): at 06:45

## 2019-09-18 RX ADMIN — Medication 1 APPLICATION(S): at 21:21

## 2019-09-18 RX ADMIN — FENTANYL CITRATE 50 MICROGRAM(S): 50 INJECTION INTRAVENOUS at 02:10

## 2019-09-18 RX ADMIN — CHLORHEXIDINE GLUCONATE 15 MILLILITER(S): 213 SOLUTION TOPICAL at 16:44

## 2019-09-18 RX ADMIN — Medication 2 MILLIGRAM(S): at 01:05

## 2019-09-18 RX ADMIN — PANTOPRAZOLE SODIUM 40 MILLIGRAM(S): 20 TABLET, DELAYED RELEASE ORAL at 11:36

## 2019-09-18 RX ADMIN — FENTANYL CITRATE 50 MICROGRAM(S): 50 INJECTION INTRAVENOUS at 00:22

## 2019-09-18 RX ADMIN — PHENYLEPHRINE HYDROCHLORIDE 2.72 MICROGRAM(S)/KG/MIN: 10 INJECTION INTRAVENOUS at 18:44

## 2019-09-18 RX ADMIN — FENTANYL CITRATE 50 MICROGRAM(S): 50 INJECTION INTRAVENOUS at 06:17

## 2019-09-18 RX ADMIN — Medication 650 MILLIGRAM(S): at 11:37

## 2019-09-18 RX ADMIN — Medication 650 MILLIGRAM(S): at 11:46

## 2019-09-18 RX ADMIN — Medication 1 APPLICATION(S): at 09:02

## 2019-09-18 RX ADMIN — Medication 650 MILLIGRAM(S): at 05:44

## 2019-09-18 RX ADMIN — Medication 1 APPLICATION(S): at 14:39

## 2019-09-18 RX ADMIN — FENTANYL CITRATE 50 MICROGRAM(S): 50 INJECTION INTRAVENOUS at 14:45

## 2019-09-18 RX ADMIN — Medication 1 MILLIGRAM(S): at 17:18

## 2019-09-18 RX ADMIN — FENTANYL CITRATE 50 MICROGRAM(S): 50 INJECTION INTRAVENOUS at 11:37

## 2019-09-18 RX ADMIN — Medication 50 MILLILITER(S): at 21:45

## 2019-09-18 RX ADMIN — FENTANYL CITRATE 50 MICROGRAM(S): 50 INJECTION INTRAVENOUS at 11:47

## 2019-09-18 RX ADMIN — ENOXAPARIN SODIUM 40 MILLIGRAM(S): 100 INJECTION SUBCUTANEOUS at 11:36

## 2019-09-18 RX ADMIN — PROPOFOL 4.36 MICROGRAM(S)/KG/MIN: 10 INJECTION, EMULSION INTRAVENOUS at 18:44

## 2019-09-18 RX ADMIN — Medication 650 MILLIGRAM(S): at 00:40

## 2019-09-18 RX ADMIN — Medication 1 APPLICATION(S): at 05:45

## 2019-09-18 RX ADMIN — Medication 650 MILLIGRAM(S): at 00:15

## 2019-09-18 RX ADMIN — FENTANYL CITRATE 50 MICROGRAM(S): 50 INJECTION INTRAVENOUS at 00:40

## 2019-09-18 RX ADMIN — Medication 1 APPLICATION(S): at 16:54

## 2019-09-18 RX ADMIN — PROPOFOL 4.36 MICROGRAM(S)/KG/MIN: 10 INJECTION, EMULSION INTRAVENOUS at 11:47

## 2019-09-18 RX ADMIN — Medication 1 APPLICATION(S): at 11:46

## 2019-09-18 RX ADMIN — Medication 1 MILLIGRAM(S): at 19:56

## 2019-09-18 RX ADMIN — FENTANYL CITRATE 50 MICROGRAM(S): 50 INJECTION INTRAVENOUS at 16:53

## 2019-09-18 RX ADMIN — CHLORHEXIDINE GLUCONATE 15 MILLILITER(S): 213 SOLUTION TOPICAL at 05:44

## 2019-09-18 RX ADMIN — FENTANYL CITRATE 50 MICROGRAM(S): 50 INJECTION INTRAVENOUS at 14:39

## 2019-09-18 NOTE — SWALLOW BEDSIDE ASSESSMENT ADULT - COMMENTS
REQUEST FOR SWALLOW CONSULT RECEIVED. CHART REVIEWED. PT WITH A HISTORY OF ANXIETY, SCHIZOPHRENIA, PRIOR BREAST CANCER AND OROPHARYNGEAL DYSPHAGIA THAT WAS DIAGNOSED AT [facility] AS AN INPATIENT IN 2018. CONSULT ORDERED AT [facility] ON 9/17/19 BUT PT WAS SINCE ORALLY INTUBATED SINCE TIME OF CONSULT REQUEST. AS SUCH, SHE IS NO LONGER A CANDIDATE FOR SPEECH PATH TESTING GIVEN ORAL INTUBATION STATUS. THUS,  WILL CANCEL CONSULT ORDER. WILL NOT ACTIVELY FOLLOW. RECONSULT SHOULD PT BE EXTUBATED AND CONDITION WARRANT. D/W NURSING.

## 2019-09-19 LAB
ANION GAP SERPL CALC-SCNC: 13 MMOL/L — SIGNIFICANT CHANGE UP (ref 5–17)
ANION GAP SERPL CALC-SCNC: 16 MMOL/L — SIGNIFICANT CHANGE UP (ref 5–17)
BASOPHILS # BLD AUTO: 0.06 K/UL — SIGNIFICANT CHANGE UP (ref 0–0.2)
BASOPHILS NFR BLD AUTO: 0.3 % — SIGNIFICANT CHANGE UP (ref 0–2)
BUN SERPL-MCNC: 17 MG/DL — SIGNIFICANT CHANGE UP (ref 7–23)
BUN SERPL-MCNC: 19 MG/DL — SIGNIFICANT CHANGE UP (ref 7–23)
CALCIUM SERPL-MCNC: 7.6 MG/DL — LOW (ref 8.5–10.1)
CALCIUM SERPL-MCNC: 8 MG/DL — LOW (ref 8.5–10.1)
CHLORIDE SERPL-SCNC: 100 MMOL/L — SIGNIFICANT CHANGE UP (ref 96–108)
CHLORIDE SERPL-SCNC: 101 MMOL/L — SIGNIFICANT CHANGE UP (ref 96–108)
CO2 SERPL-SCNC: 13 MMOL/L — LOW (ref 22–31)
CO2 SERPL-SCNC: 15 MMOL/L — LOW (ref 22–31)
CREAT SERPL-MCNC: 0.6 MG/DL — SIGNIFICANT CHANGE UP (ref 0.5–1.3)
CREAT SERPL-MCNC: 0.69 MG/DL — SIGNIFICANT CHANGE UP (ref 0.5–1.3)
CULTURE RESULTS: SIGNIFICANT CHANGE UP
CULTURE RESULTS: SIGNIFICANT CHANGE UP
EOSINOPHIL # BLD AUTO: 0.01 K/UL — SIGNIFICANT CHANGE UP (ref 0–0.5)
EOSINOPHIL NFR BLD AUTO: 0 % — SIGNIFICANT CHANGE UP (ref 0–6)
GLUCOSE SERPL-MCNC: 64 MG/DL — LOW (ref 70–99)
GLUCOSE SERPL-MCNC: 75 MG/DL — SIGNIFICANT CHANGE UP (ref 70–99)
HCT VFR BLD CALC: 34.7 % — SIGNIFICANT CHANGE UP (ref 34.5–45)
HCT VFR BLD CALC: 34.8 % — SIGNIFICANT CHANGE UP (ref 34.5–45)
HGB BLD-MCNC: 11.7 G/DL — SIGNIFICANT CHANGE UP (ref 11.5–15.5)
HGB BLD-MCNC: 12 G/DL — SIGNIFICANT CHANGE UP (ref 11.5–15.5)
IMM GRANULOCYTES NFR BLD AUTO: 0.7 % — SIGNIFICANT CHANGE UP (ref 0–1.5)
LYMPHOCYTES # BLD AUTO: 0.74 K/UL — LOW (ref 1–3.3)
LYMPHOCYTES # BLD AUTO: 3.6 % — LOW (ref 13–44)
MAGNESIUM SERPL-MCNC: 1.7 MG/DL — SIGNIFICANT CHANGE UP (ref 1.6–2.6)
MAGNESIUM SERPL-MCNC: 1.7 MG/DL — SIGNIFICANT CHANGE UP (ref 1.6–2.6)
MCHC RBC-ENTMCNC: 29.3 PG — SIGNIFICANT CHANGE UP (ref 27–34)
MCHC RBC-ENTMCNC: 29.9 PG — SIGNIFICANT CHANGE UP (ref 27–34)
MCHC RBC-ENTMCNC: 33.7 GM/DL — SIGNIFICANT CHANGE UP (ref 32–36)
MCHC RBC-ENTMCNC: 34.5 GM/DL — SIGNIFICANT CHANGE UP (ref 32–36)
MCV RBC AUTO: 86.6 FL — SIGNIFICANT CHANGE UP (ref 80–100)
MCV RBC AUTO: 87 FL — SIGNIFICANT CHANGE UP (ref 80–100)
MONOCYTES # BLD AUTO: 0.85 K/UL — SIGNIFICANT CHANGE UP (ref 0–0.9)
MONOCYTES NFR BLD AUTO: 4.1 % — SIGNIFICANT CHANGE UP (ref 2–14)
NEUTROPHILS # BLD AUTO: 18.71 K/UL — HIGH (ref 1.8–7.4)
NEUTROPHILS NFR BLD AUTO: 91.3 % — HIGH (ref 43–77)
PHOSPHATE SERPL-MCNC: 2.9 MG/DL — SIGNIFICANT CHANGE UP (ref 2.5–4.5)
PHOSPHATE SERPL-MCNC: 3.3 MG/DL — SIGNIFICANT CHANGE UP (ref 2.5–4.5)
PLATELET # BLD AUTO: 155 K/UL — SIGNIFICANT CHANGE UP (ref 150–400)
PLATELET # BLD AUTO: 165 K/UL — SIGNIFICANT CHANGE UP (ref 150–400)
POTASSIUM SERPL-MCNC: 4.2 MMOL/L — SIGNIFICANT CHANGE UP (ref 3.5–5.3)
POTASSIUM SERPL-MCNC: 4.4 MMOL/L — SIGNIFICANT CHANGE UP (ref 3.5–5.3)
POTASSIUM SERPL-SCNC: 4.2 MMOL/L — SIGNIFICANT CHANGE UP (ref 3.5–5.3)
POTASSIUM SERPL-SCNC: 4.4 MMOL/L — SIGNIFICANT CHANGE UP (ref 3.5–5.3)
RBC # BLD: 3.99 M/UL — SIGNIFICANT CHANGE UP (ref 3.8–5.2)
RBC # BLD: 4.02 M/UL — SIGNIFICANT CHANGE UP (ref 3.8–5.2)
RBC # FLD: 12.4 % — SIGNIFICANT CHANGE UP (ref 10.3–14.5)
RBC # FLD: 12.5 % — SIGNIFICANT CHANGE UP (ref 10.3–14.5)
SODIUM SERPL-SCNC: 129 MMOL/L — LOW (ref 135–145)
SODIUM SERPL-SCNC: 129 MMOL/L — LOW (ref 135–145)
SPECIMEN SOURCE: SIGNIFICANT CHANGE UP
SPECIMEN SOURCE: SIGNIFICANT CHANGE UP
WBC # BLD: 20.52 K/UL — HIGH (ref 3.8–10.5)
WBC # BLD: 22.92 K/UL — HIGH (ref 3.8–10.5)
WBC # FLD AUTO: 20.52 K/UL — HIGH (ref 3.8–10.5)
WBC # FLD AUTO: 22.92 K/UL — HIGH (ref 3.8–10.5)

## 2019-09-19 RX ADMIN — Medication 1 APPLICATION(S): at 22:16

## 2019-09-19 RX ADMIN — Medication 1 APPLICATION(S): at 17:41

## 2019-09-19 RX ADMIN — PIPERACILLIN AND TAZOBACTAM 25 GRAM(S): 4; .5 INJECTION, POWDER, LYOPHILIZED, FOR SOLUTION INTRAVENOUS at 22:16

## 2019-09-19 RX ADMIN — Medication 1 MILLIGRAM(S): at 03:12

## 2019-09-19 RX ADMIN — FENTANYL CITRATE 50 MICROGRAM(S): 50 INJECTION INTRAVENOUS at 03:54

## 2019-09-19 RX ADMIN — PROPOFOL 4.36 MICROGRAM(S)/KG/MIN: 10 INJECTION, EMULSION INTRAVENOUS at 22:16

## 2019-09-19 RX ADMIN — Medication 1 APPLICATION(S): at 03:30

## 2019-09-19 RX ADMIN — Medication 1 APPLICATION(S): at 05:06

## 2019-09-19 RX ADMIN — Medication 1 MILLIGRAM(S): at 08:40

## 2019-09-19 RX ADMIN — PHENYLEPHRINE HYDROCHLORIDE 2.72 MICROGRAM(S)/KG/MIN: 10 INJECTION INTRAVENOUS at 17:00

## 2019-09-19 RX ADMIN — PROPOFOL 4.36 MICROGRAM(S)/KG/MIN: 10 INJECTION, EMULSION INTRAVENOUS at 14:10

## 2019-09-19 RX ADMIN — PROPOFOL 4.36 MICROGRAM(S)/KG/MIN: 10 INJECTION, EMULSION INTRAVENOUS at 04:45

## 2019-09-19 RX ADMIN — Medication 1 APPLICATION(S): at 14:00

## 2019-09-19 RX ADMIN — PIPERACILLIN AND TAZOBACTAM 25 GRAM(S): 4; .5 INJECTION, POWDER, LYOPHILIZED, FOR SOLUTION INTRAVENOUS at 05:06

## 2019-09-19 RX ADMIN — PROPOFOL 4.36 MICROGRAM(S)/KG/MIN: 10 INJECTION, EMULSION INTRAVENOUS at 17:01

## 2019-09-19 RX ADMIN — Medication 1 APPLICATION(S): at 11:33

## 2019-09-19 RX ADMIN — Medication 1 APPLICATION(S): at 09:08

## 2019-09-19 RX ADMIN — SODIUM CHLORIDE 75 MILLILITER(S): 9 INJECTION INTRAMUSCULAR; INTRAVENOUS; SUBCUTANEOUS at 11:23

## 2019-09-19 RX ADMIN — CHLORHEXIDINE GLUCONATE 15 MILLILITER(S): 213 SOLUTION TOPICAL at 17:41

## 2019-09-19 RX ADMIN — CHLORHEXIDINE GLUCONATE 15 MILLILITER(S): 213 SOLUTION TOPICAL at 05:06

## 2019-09-19 RX ADMIN — PIPERACILLIN AND TAZOBACTAM 25 GRAM(S): 4; .5 INJECTION, POWDER, LYOPHILIZED, FOR SOLUTION INTRAVENOUS at 13:56

## 2019-09-19 RX ADMIN — ENOXAPARIN SODIUM 40 MILLIGRAM(S): 100 INJECTION SUBCUTANEOUS at 11:32

## 2019-09-19 RX ADMIN — Medication 1 MILLIGRAM(S): at 15:55

## 2019-09-19 RX ADMIN — Medication 1 MILLIGRAM(S): at 12:27

## 2019-09-19 RX ADMIN — Medication 1 APPLICATION(S): at 00:00

## 2019-09-19 RX ADMIN — PANTOPRAZOLE SODIUM 40 MILLIGRAM(S): 20 TABLET, DELAYED RELEASE ORAL at 11:32

## 2019-09-19 NOTE — H&P ADULT - BIRTH SEX
Bilobed Transposition Flap Text: The defect edges were debeveled with a #15 scalpel blade.  Given the location of the defect and the proximity to free margins a bilobed transposition flap was deemed most appropriate.  Using a sterile surgical marker, an appropriate bilobe flap drawn around the defect.    The area thus outlined was incised deep to adipose tissue with a #15 scalpel blade.  The skin margins were undermined to an appropriate distance in all directions utilizing iris scissors. Female

## 2019-09-19 NOTE — ADVANCED PRACTICE NURSE CONSULT - ASSESSMENT
Called to pt. bedside for IV access. Pt. has extremely poor venous access and inserted Bard Midline catheter 20g 10 cm. length via ultrasound guidance using sterile technique after 2 unsuccessful attempts to place a PIV and EDC both to right arm. Pt. intubated and unresponsive. Midline with brisk blood return, flushes well w/20 ml. NS. Biopatch, statlock and endcap placed. No CXR needed for midline. OK to use. Report given to district nurse. Lot #[id number].

## 2019-09-20 LAB
ANION GAP SERPL CALC-SCNC: 12 MMOL/L — SIGNIFICANT CHANGE UP (ref 5–17)
BUN SERPL-MCNC: 21 MG/DL — SIGNIFICANT CHANGE UP (ref 7–23)
CALCIUM SERPL-MCNC: 8.2 MG/DL — LOW (ref 8.5–10.1)
CHLORIDE SERPL-SCNC: 105 MMOL/L — SIGNIFICANT CHANGE UP (ref 96–108)
CO2 SERPL-SCNC: 17 MMOL/L — LOW (ref 22–31)
CREAT SERPL-MCNC: 1.02 MG/DL — SIGNIFICANT CHANGE UP (ref 0.5–1.3)
GLUCOSE SERPL-MCNC: 72 MG/DL — SIGNIFICANT CHANGE UP (ref 70–99)
HCT VFR BLD CALC: 26.9 % — LOW (ref 34.5–45)
HGB BLD-MCNC: 9.1 G/DL — LOW (ref 11.5–15.5)
MAGNESIUM SERPL-MCNC: 1.8 MG/DL — SIGNIFICANT CHANGE UP (ref 1.6–2.6)
MCHC RBC-ENTMCNC: 29.5 PG — SIGNIFICANT CHANGE UP (ref 27–34)
MCHC RBC-ENTMCNC: 33.8 GM/DL — SIGNIFICANT CHANGE UP (ref 32–36)
MCV RBC AUTO: 87.3 FL — SIGNIFICANT CHANGE UP (ref 80–100)
PHOSPHATE SERPL-MCNC: 3.1 MG/DL — SIGNIFICANT CHANGE UP (ref 2.5–4.5)
PLATELET # BLD AUTO: 173 K/UL — SIGNIFICANT CHANGE UP (ref 150–400)
POTASSIUM SERPL-MCNC: 3.7 MMOL/L — SIGNIFICANT CHANGE UP (ref 3.5–5.3)
POTASSIUM SERPL-SCNC: 3.7 MMOL/L — SIGNIFICANT CHANGE UP (ref 3.5–5.3)
RBC # BLD: 3.08 M/UL — LOW (ref 3.8–5.2)
RBC # FLD: 13.3 % — SIGNIFICANT CHANGE UP (ref 10.3–14.5)
SODIUM SERPL-SCNC: 134 MMOL/L — LOW (ref 135–145)
WBC # BLD: 14.22 K/UL — HIGH (ref 3.8–10.5)
WBC # FLD AUTO: 14.22 K/UL — HIGH (ref 3.8–10.5)

## 2019-09-20 RX ADMIN — Medication 1 APPLICATION(S): at 18:22

## 2019-09-20 RX ADMIN — PROPOFOL 4.36 MICROGRAM(S)/KG/MIN: 10 INJECTION, EMULSION INTRAVENOUS at 06:48

## 2019-09-20 RX ADMIN — SENNA PLUS 1 TABLET(S): 8.6 TABLET ORAL at 22:32

## 2019-09-20 RX ADMIN — Medication 50 MILLIGRAM(S): at 22:33

## 2019-09-20 RX ADMIN — Medication 1 APPLICATION(S): at 03:00

## 2019-09-20 RX ADMIN — ENOXAPARIN SODIUM 40 MILLIGRAM(S): 100 INJECTION SUBCUTANEOUS at 11:56

## 2019-09-20 RX ADMIN — SERTRALINE 150 MILLIGRAM(S): 25 TABLET, FILM COATED ORAL at 11:57

## 2019-09-20 RX ADMIN — Medication 1 APPLICATION(S): at 11:58

## 2019-09-20 RX ADMIN — Medication 30 MILLIGRAM(S): at 22:32

## 2019-09-20 RX ADMIN — Medication 1 APPLICATION(S): at 06:49

## 2019-09-20 RX ADMIN — Medication 81 MILLIGRAM(S): at 11:57

## 2019-09-20 RX ADMIN — Medication 1 APPLICATION(S): at 14:30

## 2019-09-20 RX ADMIN — Medication 1 APPLICATION(S): at 00:00

## 2019-09-20 RX ADMIN — PANTOPRAZOLE SODIUM 40 MILLIGRAM(S): 20 TABLET, DELAYED RELEASE ORAL at 11:58

## 2019-09-20 RX ADMIN — Medication 1 APPLICATION(S): at 22:25

## 2019-09-20 RX ADMIN — PIPERACILLIN AND TAZOBACTAM 25 GRAM(S): 4; .5 INJECTION, POWDER, LYOPHILIZED, FOR SOLUTION INTRAVENOUS at 06:48

## 2019-09-20 RX ADMIN — PIPERACILLIN AND TAZOBACTAM 25 GRAM(S): 4; .5 INJECTION, POWDER, LYOPHILIZED, FOR SOLUTION INTRAVENOUS at 13:57

## 2019-09-20 RX ADMIN — PIPERACILLIN AND TAZOBACTAM 25 GRAM(S): 4; .5 INJECTION, POWDER, LYOPHILIZED, FOR SOLUTION INTRAVENOUS at 22:24

## 2019-09-20 RX ADMIN — Medication 1 APPLICATION(S): at 10:45

## 2019-09-20 RX ADMIN — Medication 30 MILLIGRAM(S): at 13:57

## 2019-09-20 RX ADMIN — CHLORHEXIDINE GLUCONATE 15 MILLILITER(S): 213 SOLUTION TOPICAL at 06:50

## 2019-09-20 RX ADMIN — CHLORHEXIDINE GLUCONATE 15 MILLILITER(S): 213 SOLUTION TOPICAL at 18:22

## 2019-09-20 NOTE — DIETITIAN INITIAL EVALUATION ADULT. - PERTINENT MEDS FT
MEDICATIONS  (STANDING):  aspirin  chewable 81 milliGRAM(s) Oral daily  busPIRone 30 milliGRAM(s) Oral every 8 hours  chlorhexidine 0.12% Liquid 15 milliLiter(s) Oral Mucosa every 12 hours  enoxaparin Injectable 40 milliGRAM(s) SubCutaneous daily  pantoprazole  Injectable 40 milliGRAM(s) IV Push daily  petrolatum Ophthalmic Ointment 1 Application(s) Both EYES every 3 hours  phenylephrine    Infusion 0.1 MICROgram(s)/kG/Min (2.722 mL/Hr) IV Continuous <Continuous>  piperacillin/tazobactam IVPB.. 3.375 Gram(s) IV Intermittent every 8 hours  propofol Infusion 10 MICROgram(s)/kG/Min (4.356 mL/Hr) IV Continuous <Continuous>  senna 2 Tablet(s) Oral at bedtime  sertraline 150 milliGRAM(s) Oral daily  sodium chloride 0.9%. 1000 milliLiter(s) (75 mL/Hr) IV Continuous <Continuous>  traZODone 50 milliGRAM(s) Oral at bedtime    MEDICATIONS  (PRN):  acetaminophen   Tablet .. 650 milliGRAM(s) Oral every 6 hours PRN Temp greater or equal to 38C (100.4F), Mild Pain (1 - 3)  LORazepam   Injectable 1 milliGRAM(s) IV Push every 2 hours PRN myoclonic jerks

## 2019-09-20 NOTE — DIETITIAN INITIAL EVALUATION ADULT. - PERTINENT LABORATORY DATA
09-20 Na134 mmol/L<L> Glu 72 mg/dL K+ 3.7 mmol/L Cr  1.02 mg/dL BUN 21 mg/dL Phos 3.1 mg/dL Alb n/a   PAB n/a

## 2019-09-20 NOTE — DIETITIAN INITIAL EVALUATION ADULT. - ENTERAL
Glucerna 1.5 @ 20cc/hr +3paks of prosource increase 10cc/hr Q 6 hr until goal rate of 40cc/hr. (provides including prosource 1440calories/106gm protein) TF will meet 95% energy needs and 100% protein needs. 2) additional water flush if needed 20cc/hr (total volume 440ml)

## 2019-09-20 NOTE — DIETITIAN INITIAL EVALUATION ADULT. - OTHER INFO
80 yo female with Hx. of Schizoaffective disorder, who was sent to the ER form [facility] assisted living because of medication refusal , exacerbation of schizoaffective symptoms.  Pt sustained cardiac arrest on 9/17.  Food noted in back of mouth during intubation.  Down time at least 10 mins.  Warming protocol completed. Not responsive. Low dose of phenylephrine and sedation on hold and not needed to at this time. Prognosis as per Intensivist poor. Daughter at bedside and provided all history. Pt PTA was consuming food, however cut up in small pieces with poor intake <75% consumption x 3-4 days.  SLP evaluation consult- unable to do assessment secondary to intubation. NPO status x 4 days noted. Urine output- 400ml/24h (nephrology following). Intensivist to speak with family regarding current status and prognosis possibly today for GOC. Will provide Enteral feed recommendations however pending will remain on hold until GOC established. Weight increasing due to fluid accumulation which is expected. Will continue to monitor and follow up prn.

## 2019-09-20 NOTE — CHART NOTE - NSCHARTNOTEFT_GEN_A_CORE
Upon Nutritional Assessment by the Registered Dietitian your patient was determined to meet criteria / has evidence of the following diagnosis/diagnoses:          [ ]  Mild Protein Calorie Malnutrition        [x ]  Moderate Protein Calorie Malnutrition        [ ] Severe Protein Calorie Malnutrition        [ ] Unspecified Protein Calorie Malnutrition        [ ] Underweight / BMI <19        [ ] Morbid Obesity / BMI > 40      Findings as based on:  •  Comprehensive nutrition assessment and consultation  •  Calorie counts (nutrient intake analysis)  •  Food acceptance and intake status from observations by staff  •  Follow up  •  Patient education  •  Intervention secondary to interdisciplinary rounds  •   concerns  *********Malnutrition Patient meets criteria for moderate protein-calorie malnutrition in context of acute illness secondary to poor intake <75% of ENN x > 7 days, mild muscle wasting, and severe fluid accumulation.     Treatment:      1)Glucerna 1.5 @ 20cc/hr + 3 packs of Pros. TF inc 10cc/hr Q 6 hours until goal rate 40cc/hr. (Total Kcal 1440calories/ 106gm prot.   2) if needed water flush 20cc/hr ( 440ml).   3) Maint. asp precautions, back of bed >35 degrees.   4) Nutrition support is not rec due to overall declining med. status which evidenced based studies indicate EN is not effective in prolonging survival and improving QOL. It can also increase risk of aspiration pneumonia as well as other related issues.      PROVIDER Section:     By signing this assessment you are acknowledging and agree with the diagnosis/diagnoses assigned by the Registered Dietitian    Comments:

## 2019-09-20 NOTE — DIETITIAN INITIAL EVALUATION ADULT. - MALNUTRITION
moderate Patient meets criteria for moderate protein-calorie malnutrition in context of acute illness secondary to poor intake <75% of ENN x > 7 days, mild muscle wasting, and severe fluid accumulation.

## 2019-09-20 NOTE — DIETITIAN INITIAL EVALUATION ADULT. - PHYSICAL APPEARANCE
other (specify)/obese Limited NFPE due to supportive equipment. Mild muscle wasting noted (Temporal).   Skin intact with +1/+2/+3/+4 edema documented. Davide score= 8 (high risk for skin breakdown)

## 2019-09-20 NOTE — DIETITIAN INITIAL EVALUATION ADULT. - ADD RECOMMEND
1)Glucerna 1.5 @ 20cc/hr + 3 packs of Pros. TF inc 10cc/hr Q 6 hours until goal rate 40cc/hr. (Total Kcal 1440calories/ 106gm prot. 2) if needed water flush 20cc/hr ( 440ml). 3) Maint. asp precautions, back of bed >35 degrees. 4) Nutrition support is not rec due to overall declining med. status which evidenced based studies indicate EN is not effective in prolonging survival and improving QOL. It can also increase risk of aspiration pneumonia as well as other related issues.

## 2019-09-21 LAB
ANION GAP SERPL CALC-SCNC: 11 MMOL/L — SIGNIFICANT CHANGE UP (ref 5–17)
BUN SERPL-MCNC: 14 MG/DL — SIGNIFICANT CHANGE UP (ref 7–23)
CALCIUM SERPL-MCNC: 8.4 MG/DL — LOW (ref 8.5–10.1)
CHLORIDE SERPL-SCNC: 105 MMOL/L — SIGNIFICANT CHANGE UP (ref 96–108)
CO2 SERPL-SCNC: 18 MMOL/L — LOW (ref 22–31)
CREAT SERPL-MCNC: 0.79 MG/DL — SIGNIFICANT CHANGE UP (ref 0.5–1.3)
GLUCOSE SERPL-MCNC: 134 MG/DL — HIGH (ref 70–99)
HCT VFR BLD CALC: 23.9 % — LOW (ref 34.5–45)
HGB BLD-MCNC: 8.1 G/DL — LOW (ref 11.5–15.5)
MAGNESIUM SERPL-MCNC: 2.1 MG/DL — SIGNIFICANT CHANGE UP (ref 1.6–2.6)
MCHC RBC-ENTMCNC: 29.7 PG — SIGNIFICANT CHANGE UP (ref 27–34)
MCHC RBC-ENTMCNC: 33.9 GM/DL — SIGNIFICANT CHANGE UP (ref 32–36)
MCV RBC AUTO: 87.5 FL — SIGNIFICANT CHANGE UP (ref 80–100)
PHOSPHATE SERPL-MCNC: 1.6 MG/DL — LOW (ref 2.5–4.5)
PLATELET # BLD AUTO: 144 K/UL — LOW (ref 150–400)
POTASSIUM SERPL-MCNC: 3.2 MMOL/L — LOW (ref 3.5–5.3)
POTASSIUM SERPL-SCNC: 3.2 MMOL/L — LOW (ref 3.5–5.3)
RBC # BLD: 2.73 M/UL — LOW (ref 3.8–5.2)
RBC # FLD: 13.8 % — SIGNIFICANT CHANGE UP (ref 10.3–14.5)
SODIUM SERPL-SCNC: 134 MMOL/L — LOW (ref 135–145)
WBC # BLD: 10.38 K/UL — SIGNIFICANT CHANGE UP (ref 3.8–10.5)
WBC # FLD AUTO: 10.38 K/UL — SIGNIFICANT CHANGE UP (ref 3.8–10.5)

## 2019-09-21 RX ADMIN — PANTOPRAZOLE SODIUM 40 MILLIGRAM(S): 20 TABLET, DELAYED RELEASE ORAL at 12:17

## 2019-09-21 RX ADMIN — CHLORHEXIDINE GLUCONATE 15 MILLILITER(S): 213 SOLUTION TOPICAL at 05:29

## 2019-09-21 RX ADMIN — CHLORHEXIDINE GLUCONATE 15 MILLILITER(S): 213 SOLUTION TOPICAL at 17:54

## 2019-09-21 RX ADMIN — ENOXAPARIN SODIUM 40 MILLIGRAM(S): 100 INJECTION SUBCUTANEOUS at 12:17

## 2019-09-21 RX ADMIN — Medication 50 MILLIGRAM(S): at 21:45

## 2019-09-21 RX ADMIN — Medication 1 APPLICATION(S): at 01:16

## 2019-09-21 RX ADMIN — Medication 1 APPLICATION(S): at 17:54

## 2019-09-21 RX ADMIN — Medication 81 MILLIGRAM(S): at 12:17

## 2019-09-21 RX ADMIN — PIPERACILLIN AND TAZOBACTAM 25 GRAM(S): 4; .5 INJECTION, POWDER, LYOPHILIZED, FOR SOLUTION INTRAVENOUS at 21:46

## 2019-09-21 RX ADMIN — Medication 1 APPLICATION(S): at 12:17

## 2019-09-21 RX ADMIN — Medication 650 MILLIGRAM(S): at 05:39

## 2019-09-21 RX ADMIN — Medication 1 APPLICATION(S): at 05:07

## 2019-09-21 RX ADMIN — Medication 1 APPLICATION(S): at 21:49

## 2019-09-21 RX ADMIN — PIPERACILLIN AND TAZOBACTAM 25 GRAM(S): 4; .5 INJECTION, POWDER, LYOPHILIZED, FOR SOLUTION INTRAVENOUS at 14:59

## 2019-09-21 RX ADMIN — SERTRALINE 150 MILLIGRAM(S): 25 TABLET, FILM COATED ORAL at 12:16

## 2019-09-21 RX ADMIN — SENNA PLUS 1 TABLET(S): 8.6 TABLET ORAL at 21:45

## 2019-09-21 RX ADMIN — Medication 1 APPLICATION(S): at 15:00

## 2019-09-21 RX ADMIN — PIPERACILLIN AND TAZOBACTAM 25 GRAM(S): 4; .5 INJECTION, POWDER, LYOPHILIZED, FOR SOLUTION INTRAVENOUS at 05:39

## 2019-09-21 RX ADMIN — Medication 30 MILLIGRAM(S): at 14:59

## 2019-09-22 LAB
ANION GAP SERPL CALC-SCNC: 10 MMOL/L — SIGNIFICANT CHANGE UP (ref 5–17)
BUN SERPL-MCNC: 10 MG/DL — SIGNIFICANT CHANGE UP (ref 7–23)
CALCIUM SERPL-MCNC: 8.5 MG/DL — SIGNIFICANT CHANGE UP (ref 8.5–10.1)
CHLORIDE SERPL-SCNC: 104 MMOL/L — SIGNIFICANT CHANGE UP (ref 96–108)
CO2 SERPL-SCNC: 24 MMOL/L — SIGNIFICANT CHANGE UP (ref 22–31)
CREAT SERPL-MCNC: 0.54 MG/DL — SIGNIFICANT CHANGE UP (ref 0.5–1.3)
GLUCOSE SERPL-MCNC: 145 MG/DL — HIGH (ref 70–99)
HCT VFR BLD CALC: 25.6 % — LOW (ref 34.5–45)
HGB BLD-MCNC: 8.6 G/DL — LOW (ref 11.5–15.5)
MAGNESIUM SERPL-MCNC: 2 MG/DL — SIGNIFICANT CHANGE UP (ref 1.6–2.6)
MCHC RBC-ENTMCNC: 29.2 PG — SIGNIFICANT CHANGE UP (ref 27–34)
MCHC RBC-ENTMCNC: 33.6 GM/DL — SIGNIFICANT CHANGE UP (ref 32–36)
MCV RBC AUTO: 86.8 FL — SIGNIFICANT CHANGE UP (ref 80–100)
PHOSPHATE SERPL-MCNC: 1.1 MG/DL — LOW (ref 2.5–4.5)
PLATELET # BLD AUTO: 172 K/UL — SIGNIFICANT CHANGE UP (ref 150–400)
POTASSIUM SERPL-MCNC: 3.2 MMOL/L — LOW (ref 3.5–5.3)
POTASSIUM SERPL-SCNC: 3.2 MMOL/L — LOW (ref 3.5–5.3)
RBC # BLD: 2.95 M/UL — LOW (ref 3.8–5.2)
RBC # FLD: 13.5 % — SIGNIFICANT CHANGE UP (ref 10.3–14.5)
SODIUM SERPL-SCNC: 138 MMOL/L — SIGNIFICANT CHANGE UP (ref 135–145)
WBC # BLD: 10.07 K/UL — SIGNIFICANT CHANGE UP (ref 3.8–10.5)
WBC # FLD AUTO: 10.07 K/UL — SIGNIFICANT CHANGE UP (ref 3.8–10.5)

## 2019-09-22 PROCEDURE — 70450 CT HEAD/BRAIN W/O DYE: CPT | Mod: 26

## 2019-09-22 RX ORDER — POTASSIUM PHOSPHATE, MONOBASIC POTASSIUM PHOSPHATE, DIBASIC 236; 224 MG/ML; MG/ML
15 INJECTION, SOLUTION INTRAVENOUS ONCE
Refills: 0 | Status: COMPLETED | OUTPATIENT
Start: 2019-09-22 | End: 2019-09-22

## 2019-09-22 RX ADMIN — Medication 1 APPLICATION(S): at 05:52

## 2019-09-22 RX ADMIN — PANTOPRAZOLE SODIUM 40 MILLIGRAM(S): 20 TABLET, DELAYED RELEASE ORAL at 11:43

## 2019-09-22 RX ADMIN — Medication 30 MILLIGRAM(S): at 09:26

## 2019-09-22 RX ADMIN — ENOXAPARIN SODIUM 40 MILLIGRAM(S): 100 INJECTION SUBCUTANEOUS at 11:43

## 2019-09-22 RX ADMIN — POTASSIUM PHOSPHATE, MONOBASIC POTASSIUM PHOSPHATE, DIBASIC 63.75 MILLIMOLE(S): 236; 224 INJECTION, SOLUTION INTRAVENOUS at 11:45

## 2019-09-22 RX ADMIN — PIPERACILLIN AND TAZOBACTAM 25 GRAM(S): 4; .5 INJECTION, POWDER, LYOPHILIZED, FOR SOLUTION INTRAVENOUS at 22:00

## 2019-09-22 RX ADMIN — PIPERACILLIN AND TAZOBACTAM 25 GRAM(S): 4; .5 INJECTION, POWDER, LYOPHILIZED, FOR SOLUTION INTRAVENOUS at 14:26

## 2019-09-22 RX ADMIN — Medication 50 MILLIGRAM(S): at 22:00

## 2019-09-22 RX ADMIN — SERTRALINE 150 MILLIGRAM(S): 25 TABLET, FILM COATED ORAL at 11:43

## 2019-09-22 RX ADMIN — Medication 30 MILLIGRAM(S): at 14:26

## 2019-09-22 RX ADMIN — Medication 1 APPLICATION(S): at 17:22

## 2019-09-22 RX ADMIN — CHLORHEXIDINE GLUCONATE 15 MILLILITER(S): 213 SOLUTION TOPICAL at 04:50

## 2019-09-22 RX ADMIN — Medication 81 MILLIGRAM(S): at 11:43

## 2019-09-22 RX ADMIN — PIPERACILLIN AND TAZOBACTAM 25 GRAM(S): 4; .5 INJECTION, POWDER, LYOPHILIZED, FOR SOLUTION INTRAVENOUS at 05:51

## 2019-09-22 RX ADMIN — Medication 1 APPLICATION(S): at 04:50

## 2019-09-22 RX ADMIN — Medication 1 APPLICATION(S): at 22:23

## 2019-09-22 RX ADMIN — CHLORHEXIDINE GLUCONATE 15 MILLILITER(S): 213 SOLUTION TOPICAL at 17:22

## 2019-09-22 NOTE — PROVIDER CONTACT NOTE (OTHER) - SITUATION
Patient admitted for change in mental status related to schizophrenia
Service called and notified of neurology consult with DR Fajardo .
spoke with Dr. Farah, he is made aware of consult

## 2019-09-23 LAB
ANION GAP SERPL CALC-SCNC: 10 MMOL/L — SIGNIFICANT CHANGE UP (ref 5–17)
BUN SERPL-MCNC: 14 MG/DL — SIGNIFICANT CHANGE UP (ref 7–23)
CALCIUM SERPL-MCNC: 8.2 MG/DL — LOW (ref 8.5–10.1)
CHLORIDE SERPL-SCNC: 104 MMOL/L — SIGNIFICANT CHANGE UP (ref 96–108)
CO2 SERPL-SCNC: 25 MMOL/L — SIGNIFICANT CHANGE UP (ref 22–31)
CREAT SERPL-MCNC: 0.49 MG/DL — LOW (ref 0.5–1.3)
GLUCOSE SERPL-MCNC: 112 MG/DL — HIGH (ref 70–99)
HCT VFR BLD CALC: 21.7 % — LOW (ref 34.5–45)
HGB BLD-MCNC: 7.4 G/DL — LOW (ref 11.5–15.5)
MAGNESIUM SERPL-MCNC: 1.9 MG/DL — SIGNIFICANT CHANGE UP (ref 1.6–2.6)
MCHC RBC-ENTMCNC: 29.4 PG — SIGNIFICANT CHANGE UP (ref 27–34)
MCHC RBC-ENTMCNC: 34.1 GM/DL — SIGNIFICANT CHANGE UP (ref 32–36)
MCV RBC AUTO: 86.1 FL — SIGNIFICANT CHANGE UP (ref 80–100)
PHOSPHATE SERPL-MCNC: 1.6 MG/DL — LOW (ref 2.5–4.5)
PLATELET # BLD AUTO: 149 K/UL — LOW (ref 150–400)
POTASSIUM SERPL-MCNC: 3.1 MMOL/L — LOW (ref 3.5–5.3)
POTASSIUM SERPL-SCNC: 3.1 MMOL/L — LOW (ref 3.5–5.3)
RBC # BLD: 2.52 M/UL — LOW (ref 3.8–5.2)
RBC # FLD: 13.4 % — SIGNIFICANT CHANGE UP (ref 10.3–14.5)
SODIUM SERPL-SCNC: 139 MMOL/L — SIGNIFICANT CHANGE UP (ref 135–145)
WBC # BLD: 7.15 K/UL — SIGNIFICANT CHANGE UP (ref 3.8–10.5)
WBC # FLD AUTO: 7.15 K/UL — SIGNIFICANT CHANGE UP (ref 3.8–10.5)

## 2019-09-23 PROCEDURE — 99223 1ST HOSP IP/OBS HIGH 75: CPT

## 2019-09-23 PROCEDURE — 95819 EEG AWAKE AND ASLEEP: CPT | Mod: 26

## 2019-09-23 RX ORDER — SODIUM,POTASSIUM PHOSPHATES 278-250MG
2 POWDER IN PACKET (EA) ORAL THREE TIMES A DAY
Refills: 0 | Status: COMPLETED | OUTPATIENT
Start: 2019-09-23 | End: 2019-09-25

## 2019-09-23 RX ORDER — POTASSIUM CHLORIDE 20 MEQ
40 PACKET (EA) ORAL EVERY 4 HOURS
Refills: 0 | Status: COMPLETED | OUTPATIENT
Start: 2019-09-23 | End: 2019-09-23

## 2019-09-23 RX ORDER — PROPOFOL 10 MG/ML
10 INJECTION, EMULSION INTRAVENOUS
Qty: 1000 | Refills: 0 | Status: DISCONTINUED | OUTPATIENT
Start: 2019-09-23 | End: 2019-09-28

## 2019-09-23 RX ORDER — PANTOPRAZOLE SODIUM 20 MG/1
40 TABLET, DELAYED RELEASE ORAL DAILY
Refills: 0 | Status: DISCONTINUED | OUTPATIENT
Start: 2019-09-23 | End: 2019-09-28

## 2019-09-23 RX ADMIN — PIPERACILLIN AND TAZOBACTAM 25 GRAM(S): 4; .5 INJECTION, POWDER, LYOPHILIZED, FOR SOLUTION INTRAVENOUS at 16:03

## 2019-09-23 RX ADMIN — Medication 50 MILLIGRAM(S): at 21:50

## 2019-09-23 RX ADMIN — PIPERACILLIN AND TAZOBACTAM 25 GRAM(S): 4; .5 INJECTION, POWDER, LYOPHILIZED, FOR SOLUTION INTRAVENOUS at 21:50

## 2019-09-23 RX ADMIN — Medication 40 MILLIEQUIVALENT(S): at 09:19

## 2019-09-23 RX ADMIN — CHLORHEXIDINE GLUCONATE 15 MILLILITER(S): 213 SOLUTION TOPICAL at 05:26

## 2019-09-23 RX ADMIN — Medication 1 APPLICATION(S): at 18:57

## 2019-09-23 RX ADMIN — PANTOPRAZOLE SODIUM 40 MILLIGRAM(S): 20 TABLET, DELAYED RELEASE ORAL at 12:50

## 2019-09-23 RX ADMIN — Medication 2 MILLIGRAM(S): at 21:50

## 2019-09-23 RX ADMIN — Medication 1 APPLICATION(S): at 16:04

## 2019-09-23 RX ADMIN — Medication 2 PACKET(S): at 16:02

## 2019-09-23 RX ADMIN — Medication 40 MILLIEQUIVALENT(S): at 16:02

## 2019-09-23 RX ADMIN — Medication 1 APPLICATION(S): at 12:50

## 2019-09-23 RX ADMIN — PIPERACILLIN AND TAZOBACTAM 25 GRAM(S): 4; .5 INJECTION, POWDER, LYOPHILIZED, FOR SOLUTION INTRAVENOUS at 05:59

## 2019-09-23 RX ADMIN — Medication 2 MILLIGRAM(S): at 19:25

## 2019-09-23 RX ADMIN — Medication 81 MILLIGRAM(S): at 12:50

## 2019-09-23 RX ADMIN — Medication 1 APPLICATION(S): at 21:46

## 2019-09-23 RX ADMIN — Medication 1 APPLICATION(S): at 09:19

## 2019-09-23 RX ADMIN — ENOXAPARIN SODIUM 40 MILLIGRAM(S): 100 INJECTION SUBCUTANEOUS at 12:50

## 2019-09-23 RX ADMIN — Medication 2 PACKET(S): at 21:51

## 2019-09-23 RX ADMIN — CHLORHEXIDINE GLUCONATE 15 MILLILITER(S): 213 SOLUTION TOPICAL at 18:56

## 2019-09-23 RX ADMIN — SERTRALINE 150 MILLIGRAM(S): 25 TABLET, FILM COATED ORAL at 12:50

## 2019-09-23 NOTE — CONSULT NOTE ADULT - ASSESSMENT
78 y/o female with h/o schizoaffective disorder, breast Ca s/p lumpectomy was admitted on 9/14 for increased confusion. She  was sent to the ER form [facility] because of medication refusal and increased confusion. EMS report, pt has been refusing medications, refusing to answer questions, reporting that people are following her home and that men are sneaking into her room, removing her cloth in public. In the ER the patient was noted febrile and received cephalexin.    1. Low grade fever. Pyuria. Probable UTI. Encephalopathy.  -obtain BC x 2, urine c/s  -agree with ceftin 500 mg PO q12h  -reason for abx use and side effects reviewed with patient; monitor BMP   -f/u cultures  -old chart reviewed to assess prior cultures  -monitor temps  -f/u CBC  -supportive care  2. Other issues:   -care per medicine
80 yo female with Hx. of Schizoaffective disorder, who was sent to the ER form [facility] because of medication refusal , exacerbation of schizoaffective symptoms.  Pt sustained cardiac arrest on 9/17.  Food noted in back of mouth during intubation.  Down time at least 10 mins.      Bilateral Cerebral and Brainstem Dysfunction consistent with Anoxic Encephalopathy    CT did not reveal any acute pathology   EEG diffuse slow back ground with burst suppression.   Rec 24 EEG monitoring in progression.  Consider MRI DTi imaging.  Discussed with Patient's daughter and Dr. Gonzales.   Prognosis grave.
79 schizoaffective disorder, breast ca who was sent to the ER form [facility] assisted living because of medication refusal , exacerbation of schizoaffective symptoms with renal evaluation of  hyponatremia. SUspect hyponatremia secondary to excess water intake, SIADh in the setting of chronic psych meds.     Hyponatremia  -urine studies, lytes  -TSH and cortisol  -Limit free water to 1500/day,  excess intake is largest culprit currently in dropping Na  -No further IVF  -Optimize osmolar intake    Thanks, will follow  d/c with staff  d/c with Dr Moore

## 2019-09-23 NOTE — CONSULT NOTE ADULT - SUBJECTIVE AND OBJECTIVE BOX
Patient is a 79y old  Female who presents with a chief complaint of UTI, AMS , Schizophrenia    HPI:  80 y/o female with h/o schizoaffective disorder, breast Ca s/p lumpectomy was admitted on  for increased confusion. She  was sent to the ER form [facility] because of medication refusal and increased confusion. EMS report, pt has been refusing medications, refusing to answer questions, reporting that people are following her home and that men are sneaking into her room, removing her cloth in public. In the ER the patient was noted febrile and received cephalexin.    PMHx: Schizophrenia, Breast CA  PSHx: Lumpectomy     Meds: per reconciliation sheet, noted below  MEDICATIONS  (STANDING):  cephalexin 500 milliGRAM(s) Oral every 12 hours  letrozole 2.5 milliGRAM(s) Oral daily  LORazepam     Tablet 1 milliGRAM(s) Oral two times a day  melatonin 3 milliGRAM(s) Oral at bedtime  QUEtiapine 100 milliGRAM(s) Oral at bedtime  senna 2 Tablet(s) Oral at bedtime  sertraline 100 milliGRAM(s) Oral daily  traZODone 50 milliGRAM(s) Oral at bedtime    MEDICATIONS  (PRN):  acetaminophen   Tablet .. 650 milliGRAM(s) Oral every 6 hours PRN Temp greater or equal to 38C (100.4F), Mild Pain (1 - 3)  guaiFENesin    Syrup 200 milliGRAM(s) Oral every 6 hours PRN Cough    Allergies    Keppra (Rash)  PT ALLERGIC TO THE GENERIC OF ZYPREXA (Rash)    Intolerances    Social: no smoking, no alcohol, no illegal drugs; no recent travel, no exposure to TB  FAMILY HISTORY:  No pertinent family history in first degree relatives: parents , does not know parents medical history    no history of premature cardiovascular disease in first degree relatives    ROS: the patient is confused; poorly verbal; unobtainable  All other systems reviewed and are negative    Vital Signs Last 24 Hrs  T(C): 36.8 (15 Sep 2019 11:39), Max: 38 (14 Sep 2019 16:55)  T(F): 98.2 (15 Sep 2019 11:39), Max: 100.4 (14 Sep 2019 16:55)  HR: 83 (15 Sep 2019 11:39) (83 - 113)  BP: 122/87 (15 Sep 2019 11:39) (113/58 - 146/67)  BP(mean): --  RR: 18 (15 Sep 2019 11:39) (16 - 19)  SpO2: 95% (15 Sep 2019 11:39) (93% - 96%)  Daily     Daily     PE:    Constitutional: frail looking  HEENT: NC/AT, EOMI, PERRLA, conjunctivae clear; ears and nose atraumatic; pharynx benign  Neck: supple; thyroid not palpable  Back: no tenderness  Respiratory: respiratory effort normal; decreased BS at bases  Cardiovascular: S1S2 regular, no murmurs  Abdomen: soft, not tender, not distended, positive BS; no liver or spleen organomegaly  Genitourinary: no suprapubic tenderness  Lymphatic: no LN palpable  Musculoskeletal: no muscle tenderness, no joint swelling or tenderness  Extremities: no pedal edema  Neurological/ Psychiatric: confused, judgement and insight impaired; moving all extremities  Skin: no rashes; no palpable lesions    Labs: all available labs reviewed                        12.3   8.11  )-----------( 201      ( 14 Sep 2019 11:35 )             36.4     -14    133<L>  |  100  |  14  ----------------------------<  123<H>  4.2   |  25  |  0.84    Ca    9.3      14 Sep 2019 11:35    TPro  7.9  /  Alb  3.8  /  TBili  0.6  /  DBili  x   /  AST  20  /  ALT  24  /  AlkPhos  155<H>       LIVER FUNCTIONS - ( 14 Sep 2019 11:35 )  Alb: 3.8 g/dL / Pro: 7.9 gm/dL / ALK PHOS: 155 U/L / ALT: 24 U/L / AST: 20 U/L / GGT: x           Urinalysis Basic - ( 14 Sep 2019 11:48 )    Color: Yellow / Appearance: Slightly Turbid / S.015 / pH: x  Gluc: x / Ketone: Negative  / Bili: Negative / Urobili: Negative mg/dL   Blood: x / Protein: Negative mg/dL / Nitrite: Positive   Leuk Esterase: Moderate / RBC: 3-5 /HPF / WBC 26-50   Sq Epi: x / Non Sq Epi: x / Bacteria: Many      Radiology: all available radiological tests reviewed    < from: Xray Chest 1 View AP/PA. (19 @ 17:27) >  Linear atelectasis at the right lung base.    < end of copied text >      Advanced directives addressed: full resuscitation
Patient is a 79y old  Female who presents with a chief complaint of UTI, AMS , Schizophrenia admitted on 19 and Car      HPI:  HPI:The patient is an 78 yo female with Hx. of Schizoaffective disorder, who was sent to the ER form [facility] because of medication refusal , exacerbation of schizoaffective symptoms. EMS report, pt has been refusing medications, refusing to answer questions or speak, reporting that people are following her home and that men are sneaking into her room, removing her cloth in public. In the ER the patient is disoriented, communicating, not c/o pain. Suffered   Cardiac arrest on 19 , intubated on sedation, found food in back of mouth during intubation. Down time at least 10 mins . Pt Vented, sedated and cooled post intubation. Now Neurology eval requested at family request. Pt unresponsive, decerebrate posturing at times to stimuli.       PAST MEDICAL & SURGICAL HISTORY:  Schizophrenia  Breast cancer  No significant past surgical history      FAMILY HISTORY:  No pertinent family history in first degree relatives: parents , does not know parents medical history    Social Hx:  Nonsmoker, no drug or alcohol use    MEDICATIONS  (STANDING):  aspirin  chewable 81 milliGRAM(s) Oral daily  chlorhexidine 0.12% Liquid 15 milliLiter(s) Oral Mucosa every 12 hours  enoxaparin Injectable 40 milliGRAM(s) SubCutaneous daily  pantoprazole   Suspension 40 milliGRAM(s) Oral daily  petrolatum Ophthalmic Ointment 1 Application(s) Both EYES every 3 hours  piperacillin/tazobactam IVPB.. 3.375 Gram(s) IV Intermittent every 8 hours  potassium chloride   Powder 40 milliEquivalent(s) Oral every 4 hours  potassium phosphate / sodium phosphate powder 2 Packet(s) Oral three times a day  senna 2 Tablet(s) Oral at bedtime  sertraline 150 milliGRAM(s) Oral daily  traZODone 50 milliGRAM(s) Oral at bedtime       Allergies    Keppra (Rash)  PT ALLERGIC TO THE GENERIC OF ZYPREXA (Rash)    ROS: Pertinent positives in HPI, all other ROS were reviewed and are negative.      Vital Signs Last 24 Hrs  T(C): 36.2 (23 Sep 2019 10:00), Max: 37.3 (23 Sep 2019 00:49)  T(F): 97.1 (23 Sep 2019 10:00), Max: 99.1 (23 Sep 2019 00:49)  HR: 69 (23 Sep 2019 11:16) (62 - 91)  BP: 116/53 (23 Sep 2019 11:00) (104/54 - 153/71)  BP(mean): 67 (23 Sep 2019 11:00) (60 - 93)  RR: --  SpO2: 100% (23 Sep 2019 11:16) (96% - 100%)        Neurological exam: Limited Exam Pt on Ventilator, not on sedation.  HF:   CN: Pupils, pin point 1.5 mm no reactive, unable to visualize fundi, No response Dolls's head movement, + ve corneal respone bilaterally. Gag not tested  Motor: No spontaneous movements noted bur decerebrate posturing noted left> right..    Sens: no response  Reflexes: Reflexes 0 bilaterally Plantars triple flexion response  Coord:  Not able to assess   Gait/Balance: Cannot test      Labs:       139  |  104  |  14  ----------------------------<  112<H>  3.1<L>   |  25  |  0.49<L>    Ca    8.2<L>      23 Sep 2019 06:45  Phos  1.6       Mg     1.9                                     7.4    7.15  )-----------( 149      ( 23 Sep 2019 06:45 )             21.7       Radiology:  - CT Head:  < from: CT Head No Cont (19 @ 10:44) >  IMPRESSION:    No acute intracranial hemorrhage.    EEG  < from: EEG Awake and Asleep (19 @ 10:30) >  EEG Summary/Classification:  This is a markedly abnormal EEG due to the presence of voltage   suppression with some intermittent sharpwaves. This is most suggestive   of burst suppression.    EEG Impression/Clinical Correlate:  These findings may be seen in the setting of anoxic encephalopathy. They   may also be seen during deep sedation. Clinical correlation is   recommended.
79y Female whom presented to the hospital with schizoaffective disorder, breast ca who was sent to the ER form [facility] because of medication refusal , exacerbation of schizoaffective symptoms with renal evaluation of  hyponatremia. Per chart and EMS reports, pt has been refusing medications, refusing to answer questions or speak. Patient noted with decreasing sodium so accordingly renal evaluation.     PAST MEDICAL & SURGICAL HISTORY:  Schizophrenia  Breast cancer  No significant past surgical history      MEDICATIONS  (STANDING):  cefuroxime   Tablet 500 milliGRAM(s) Oral every 12 hours  enoxaparin Injectable 40 milliGRAM(s) SubCutaneous daily  letrozole 2.5 milliGRAM(s) Oral daily  LORazepam     Tablet 1 milliGRAM(s) Oral two times a day  melatonin 6 milliGRAM(s) Oral at bedtime  QUEtiapine 100 milliGRAM(s) Oral <User Schedule>  QUEtiapine 200 milliGRAM(s) Oral at bedtime  senna 2 Tablet(s) Oral at bedtime  sertraline 150 milliGRAM(s) Oral daily  traZODone 50 milliGRAM(s) Oral at bedtime    MEDICATIONS  (PRN):  acetaminophen   Tablet .. 650 milliGRAM(s) Oral every 6 hours PRN Temp greater or equal to 38C (100.4F), Mild Pain (1 - 3)  guaiFENesin    Syrup 200 milliGRAM(s) Oral every 6 hours PRN Cough      Allergies    Keppra (Rash)  PT ALLERGIC TO THE GENERIC OF ZYPREXA (Rash)    Intolerances        SOCIAL HISTORY:  No cigg/etoh reported    FAMILY HISTORY:  No pertinent family history in first degree relatives: parents , does not know parents medical history      REVIEW OF SYSTEMS:    UTO, confusion      T(C): , Max: 37.6 (19 @ 21:35)  T(F): , Max: 99.7 (19 @ 21:35)  HR: 86 (19 @ 11:38)  BP: 114/50 (19 @ 11:38)  BP(mean): --  RR: 18 (19 @ 11:38)  SpO2: 98% (19 @ 11:38)  Wt(kg): --    PHYSICAL EXAM:    Constitutional: NAD, poor hygiene. >then stated age  HEENT: PERRLA, EOMI,  MMM  Neck: No LAD, No JVD  Respiratory: dist BS  Cardiovascular: S1 and S2, RRR  Gastrointestinal: BS+, soft, NT/ND  Extremities: No peripheral edema  Neurological: Alert, pleasent but not answering questions  : No Flor  Skin: No rashes  Access: Not applicable        LABS:                        9.9    5.02  )-----------( 123      ( 17 Sep 2019 07:12 )             28.8     17 Sep 2019 07:12    128    |  95     |  14     ----------------------------<  94     3.6     |  24     |  0.70   16 Sep 2019 06:35    136    |  104    |  20     ----------------------------<  99     4.2     |  25     |  0.79   14 Sep 2019 11:35    133    |  100    |  14     ----------------------------<  123    4.2     |  25     |  0.84     Ca    8.1        17 Sep 2019 07:12  Ca    8.4        16 Sep 2019 06:35  Ca    9.3        14 Sep 2019 11:35    TPro  7.9    /  Alb  3.8    /  TBili  0.6    /  DBili  x      /  AST  20     /  ALT  24     /  AlkPhos  155    14 Sep 2019 11:35          Urine Studies:          RADIOLOGY & ADDITIONAL STUDIES:

## 2019-09-24 LAB
ANION GAP SERPL CALC-SCNC: 6 MMOL/L — SIGNIFICANT CHANGE UP (ref 5–17)
BUN SERPL-MCNC: 22 MG/DL — SIGNIFICANT CHANGE UP (ref 7–23)
CALCIUM SERPL-MCNC: 8.4 MG/DL — LOW (ref 8.5–10.1)
CHLORIDE SERPL-SCNC: 106 MMOL/L — SIGNIFICANT CHANGE UP (ref 96–108)
CO2 SERPL-SCNC: 28 MMOL/L — SIGNIFICANT CHANGE UP (ref 22–31)
CREAT SERPL-MCNC: 0.61 MG/DL — SIGNIFICANT CHANGE UP (ref 0.5–1.3)
GLUCOSE SERPL-MCNC: 113 MG/DL — HIGH (ref 70–99)
HCT VFR BLD CALC: 22.3 % — LOW (ref 34.5–45)
HGB BLD-MCNC: 7.2 G/DL — LOW (ref 11.5–15.5)
MAGNESIUM SERPL-MCNC: 1.9 MG/DL — SIGNIFICANT CHANGE UP (ref 1.6–2.6)
MCHC RBC-ENTMCNC: 29.1 PG — SIGNIFICANT CHANGE UP (ref 27–34)
MCHC RBC-ENTMCNC: 32.3 GM/DL — SIGNIFICANT CHANGE UP (ref 32–36)
MCV RBC AUTO: 90.3 FL — SIGNIFICANT CHANGE UP (ref 80–100)
PHOSPHATE SERPL-MCNC: 2.4 MG/DL — LOW (ref 2.5–4.5)
PLATELET # BLD AUTO: 173 K/UL — SIGNIFICANT CHANGE UP (ref 150–400)
POTASSIUM SERPL-MCNC: 4.7 MMOL/L — SIGNIFICANT CHANGE UP (ref 3.5–5.3)
POTASSIUM SERPL-SCNC: 4.7 MMOL/L — SIGNIFICANT CHANGE UP (ref 3.5–5.3)
RBC # BLD: 2.47 M/UL — LOW (ref 3.8–5.2)
RBC # FLD: 13.9 % — SIGNIFICANT CHANGE UP (ref 10.3–14.5)
SODIUM SERPL-SCNC: 140 MMOL/L — SIGNIFICANT CHANGE UP (ref 135–145)
WBC # BLD: 6.86 K/UL — SIGNIFICANT CHANGE UP (ref 3.8–10.5)
WBC # FLD AUTO: 6.86 K/UL — SIGNIFICANT CHANGE UP (ref 3.8–10.5)

## 2019-09-24 PROCEDURE — 99233 SBSQ HOSP IP/OBS HIGH 50: CPT

## 2019-09-24 PROCEDURE — 95951: CPT | Mod: 26

## 2019-09-24 RX ORDER — LACOSAMIDE 50 MG/1
100 TABLET ORAL
Refills: 0 | Status: DISCONTINUED | OUTPATIENT
Start: 2019-09-24 | End: 2019-09-28

## 2019-09-24 RX ADMIN — Medication 1 APPLICATION(S): at 21:22

## 2019-09-24 RX ADMIN — ENOXAPARIN SODIUM 40 MILLIGRAM(S): 100 INJECTION SUBCUTANEOUS at 12:06

## 2019-09-24 RX ADMIN — LACOSAMIDE 100 MILLIGRAM(S): 50 TABLET ORAL at 09:29

## 2019-09-24 RX ADMIN — Medication 1 APPLICATION(S): at 03:49

## 2019-09-24 RX ADMIN — CHLORHEXIDINE GLUCONATE 15 MILLILITER(S): 213 SOLUTION TOPICAL at 18:01

## 2019-09-24 RX ADMIN — Medication 1 APPLICATION(S): at 05:55

## 2019-09-24 RX ADMIN — Medication 50 MILLIGRAM(S): at 21:22

## 2019-09-24 RX ADMIN — Medication 1 APPLICATION(S): at 09:29

## 2019-09-24 RX ADMIN — SERTRALINE 150 MILLIGRAM(S): 25 TABLET, FILM COATED ORAL at 12:06

## 2019-09-24 RX ADMIN — Medication 2 PACKET(S): at 21:22

## 2019-09-24 RX ADMIN — PANTOPRAZOLE SODIUM 40 MILLIGRAM(S): 20 TABLET, DELAYED RELEASE ORAL at 12:08

## 2019-09-24 RX ADMIN — PIPERACILLIN AND TAZOBACTAM 25 GRAM(S): 4; .5 INJECTION, POWDER, LYOPHILIZED, FOR SOLUTION INTRAVENOUS at 15:40

## 2019-09-24 RX ADMIN — PROPOFOL 4.36 MICROGRAM(S)/KG/MIN: 10 INJECTION, EMULSION INTRAVENOUS at 03:24

## 2019-09-24 RX ADMIN — Medication 1 APPLICATION(S): at 00:50

## 2019-09-24 RX ADMIN — Medication 1 APPLICATION(S): at 18:01

## 2019-09-24 RX ADMIN — Medication 1 APPLICATION(S): at 15:40

## 2019-09-24 RX ADMIN — LACOSAMIDE 100 MILLIGRAM(S): 50 TABLET ORAL at 18:01

## 2019-09-24 RX ADMIN — Medication 2 PACKET(S): at 15:40

## 2019-09-24 RX ADMIN — Medication 2 PACKET(S): at 05:26

## 2019-09-24 RX ADMIN — Medication 81 MILLIGRAM(S): at 12:07

## 2019-09-24 RX ADMIN — PIPERACILLIN AND TAZOBACTAM 25 GRAM(S): 4; .5 INJECTION, POWDER, LYOPHILIZED, FOR SOLUTION INTRAVENOUS at 07:32

## 2019-09-24 RX ADMIN — CHLORHEXIDINE GLUCONATE 15 MILLILITER(S): 213 SOLUTION TOPICAL at 05:55

## 2019-09-24 RX ADMIN — Medication 1 APPLICATION(S): at 12:07

## 2019-09-25 LAB
ANION GAP SERPL CALC-SCNC: 8 MMOL/L — SIGNIFICANT CHANGE UP (ref 5–17)
BUN SERPL-MCNC: 17 MG/DL — SIGNIFICANT CHANGE UP (ref 7–23)
CALCIUM SERPL-MCNC: 8.6 MG/DL — SIGNIFICANT CHANGE UP (ref 8.5–10.1)
CHLORIDE SERPL-SCNC: 102 MMOL/L — SIGNIFICANT CHANGE UP (ref 96–108)
CO2 SERPL-SCNC: 29 MMOL/L — SIGNIFICANT CHANGE UP (ref 22–31)
CREAT SERPL-MCNC: 0.49 MG/DL — LOW (ref 0.5–1.3)
GLUCOSE SERPL-MCNC: 117 MG/DL — HIGH (ref 70–99)
HCT VFR BLD CALC: 24.8 % — LOW (ref 34.5–45)
HGB BLD-MCNC: 8.2 G/DL — LOW (ref 11.5–15.5)
MAGNESIUM SERPL-MCNC: 1.8 MG/DL — SIGNIFICANT CHANGE UP (ref 1.6–2.6)
MCHC RBC-ENTMCNC: 29.4 PG — SIGNIFICANT CHANGE UP (ref 27–34)
MCHC RBC-ENTMCNC: 33.1 GM/DL — SIGNIFICANT CHANGE UP (ref 32–36)
MCV RBC AUTO: 88.9 FL — SIGNIFICANT CHANGE UP (ref 80–100)
PHOSPHATE SERPL-MCNC: 3.7 MG/DL — SIGNIFICANT CHANGE UP (ref 2.5–4.5)
PLATELET # BLD AUTO: 227 K/UL — SIGNIFICANT CHANGE UP (ref 150–400)
POTASSIUM SERPL-MCNC: 4.1 MMOL/L — SIGNIFICANT CHANGE UP (ref 3.5–5.3)
POTASSIUM SERPL-SCNC: 4.1 MMOL/L — SIGNIFICANT CHANGE UP (ref 3.5–5.3)
RBC # BLD: 2.79 M/UL — LOW (ref 3.8–5.2)
RBC # FLD: 13.6 % — SIGNIFICANT CHANGE UP (ref 10.3–14.5)
SODIUM SERPL-SCNC: 139 MMOL/L — SIGNIFICANT CHANGE UP (ref 135–145)
WBC # BLD: 11.56 K/UL — HIGH (ref 3.8–10.5)
WBC # FLD AUTO: 11.56 K/UL — HIGH (ref 3.8–10.5)

## 2019-09-25 PROCEDURE — 95951: CPT | Mod: 26

## 2019-09-25 PROCEDURE — 99233 SBSQ HOSP IP/OBS HIGH 50: CPT

## 2019-09-25 PROCEDURE — 70551 MRI BRAIN STEM W/O DYE: CPT | Mod: 26

## 2019-09-25 RX ORDER — SODIUM,POTASSIUM PHOSPHATES 278-250MG
2 POWDER IN PACKET (EA) ORAL
Refills: 0 | Status: COMPLETED | OUTPATIENT
Start: 2019-09-25 | End: 2019-09-27

## 2019-09-25 RX ADMIN — PROPOFOL 4.36 MICROGRAM(S)/KG/MIN: 10 INJECTION, EMULSION INTRAVENOUS at 10:20

## 2019-09-25 RX ADMIN — CHLORHEXIDINE GLUCONATE 15 MILLILITER(S): 213 SOLUTION TOPICAL at 06:01

## 2019-09-25 RX ADMIN — Medication 2 PACKET(S): at 06:01

## 2019-09-25 RX ADMIN — LACOSAMIDE 100 MILLIGRAM(S): 50 TABLET ORAL at 06:01

## 2019-09-25 RX ADMIN — Medication 1 APPLICATION(S): at 00:00

## 2019-09-25 RX ADMIN — Medication 2 MILLIGRAM(S): at 09:59

## 2019-09-25 RX ADMIN — CHLORHEXIDINE GLUCONATE 15 MILLILITER(S): 213 SOLUTION TOPICAL at 18:14

## 2019-09-25 RX ADMIN — Medication 650 MILLIGRAM(S): at 20:13

## 2019-09-25 RX ADMIN — Medication 50 MILLIGRAM(S): at 21:09

## 2019-09-25 RX ADMIN — Medication 81 MILLIGRAM(S): at 11:30

## 2019-09-25 RX ADMIN — PANTOPRAZOLE SODIUM 40 MILLIGRAM(S): 20 TABLET, DELAYED RELEASE ORAL at 11:42

## 2019-09-25 RX ADMIN — Medication 2 PACKET(S): at 18:14

## 2019-09-25 RX ADMIN — Medication 1 APPLICATION(S): at 08:49

## 2019-09-25 RX ADMIN — Medication 1 APPLICATION(S): at 21:09

## 2019-09-25 RX ADMIN — LACOSAMIDE 100 MILLIGRAM(S): 50 TABLET ORAL at 18:13

## 2019-09-25 RX ADMIN — Medication 1 APPLICATION(S): at 06:02

## 2019-09-25 RX ADMIN — ENOXAPARIN SODIUM 40 MILLIGRAM(S): 100 INJECTION SUBCUTANEOUS at 11:31

## 2019-09-25 RX ADMIN — Medication 1 APPLICATION(S): at 16:09

## 2019-09-25 RX ADMIN — Medication 1 APPLICATION(S): at 11:32

## 2019-09-25 RX ADMIN — Medication 1 APPLICATION(S): at 03:56

## 2019-09-25 RX ADMIN — SERTRALINE 150 MILLIGRAM(S): 25 TABLET, FILM COATED ORAL at 11:31

## 2019-09-25 RX ADMIN — Medication 1 APPLICATION(S): at 18:13

## 2019-09-26 LAB
ANION GAP SERPL CALC-SCNC: 4 MMOL/L — LOW (ref 5–17)
BUN SERPL-MCNC: 22 MG/DL — SIGNIFICANT CHANGE UP (ref 7–23)
CALCIUM SERPL-MCNC: 8.9 MG/DL — SIGNIFICANT CHANGE UP (ref 8.5–10.1)
CHLORIDE SERPL-SCNC: 102 MMOL/L — SIGNIFICANT CHANGE UP (ref 96–108)
CO2 SERPL-SCNC: 31 MMOL/L — SIGNIFICANT CHANGE UP (ref 22–31)
CREAT SERPL-MCNC: 0.5 MG/DL — SIGNIFICANT CHANGE UP (ref 0.5–1.3)
GLUCOSE SERPL-MCNC: 115 MG/DL — HIGH (ref 70–99)
HCT VFR BLD CALC: 23.6 % — LOW (ref 34.5–45)
HGB BLD-MCNC: 7.8 G/DL — LOW (ref 11.5–15.5)
MAGNESIUM SERPL-MCNC: 2 MG/DL — SIGNIFICANT CHANGE UP (ref 1.6–2.6)
MCHC RBC-ENTMCNC: 29.7 PG — SIGNIFICANT CHANGE UP (ref 27–34)
MCHC RBC-ENTMCNC: 33.1 GM/DL — SIGNIFICANT CHANGE UP (ref 32–36)
MCV RBC AUTO: 89.7 FL — SIGNIFICANT CHANGE UP (ref 80–100)
PHOSPHATE SERPL-MCNC: 3.4 MG/DL — SIGNIFICANT CHANGE UP (ref 2.5–4.5)
PLATELET # BLD AUTO: 202 K/UL — SIGNIFICANT CHANGE UP (ref 150–400)
POTASSIUM SERPL-MCNC: 4.3 MMOL/L — SIGNIFICANT CHANGE UP (ref 3.5–5.3)
POTASSIUM SERPL-SCNC: 4.3 MMOL/L — SIGNIFICANT CHANGE UP (ref 3.5–5.3)
RBC # BLD: 2.63 M/UL — LOW (ref 3.8–5.2)
RBC # FLD: 13.5 % — SIGNIFICANT CHANGE UP (ref 10.3–14.5)
SODIUM SERPL-SCNC: 137 MMOL/L — SIGNIFICANT CHANGE UP (ref 135–145)
WBC # BLD: 11.44 K/UL — HIGH (ref 3.8–10.5)
WBC # FLD AUTO: 11.44 K/UL — HIGH (ref 3.8–10.5)

## 2019-09-26 PROCEDURE — 71045 X-RAY EXAM CHEST 1 VIEW: CPT | Mod: 26,77

## 2019-09-26 PROCEDURE — 99233 SBSQ HOSP IP/OBS HIGH 50: CPT

## 2019-09-26 PROCEDURE — 71045 X-RAY EXAM CHEST 1 VIEW: CPT | Mod: 26

## 2019-09-26 RX ADMIN — LACOSAMIDE 100 MILLIGRAM(S): 50 TABLET ORAL at 17:54

## 2019-09-26 RX ADMIN — Medication 2 PACKET(S): at 17:55

## 2019-09-26 RX ADMIN — Medication 1 APPLICATION(S): at 21:25

## 2019-09-26 RX ADMIN — CHLORHEXIDINE GLUCONATE 15 MILLILITER(S): 213 SOLUTION TOPICAL at 05:29

## 2019-09-26 RX ADMIN — LACOSAMIDE 100 MILLIGRAM(S): 50 TABLET ORAL at 05:34

## 2019-09-26 RX ADMIN — Medication 1 APPLICATION(S): at 05:28

## 2019-09-26 RX ADMIN — ENOXAPARIN SODIUM 40 MILLIGRAM(S): 100 INJECTION SUBCUTANEOUS at 11:13

## 2019-09-26 RX ADMIN — Medication 81 MILLIGRAM(S): at 11:13

## 2019-09-26 RX ADMIN — PANTOPRAZOLE SODIUM 40 MILLIGRAM(S): 20 TABLET, DELAYED RELEASE ORAL at 11:13

## 2019-09-26 RX ADMIN — Medication 2 PACKET(S): at 05:34

## 2019-09-26 RX ADMIN — Medication 1 APPLICATION(S): at 11:14

## 2019-09-26 RX ADMIN — Medication 1 APPLICATION(S): at 15:28

## 2019-09-26 RX ADMIN — Medication 50 MILLIGRAM(S): at 21:25

## 2019-09-26 RX ADMIN — Medication 650 MILLIGRAM(S): at 22:01

## 2019-09-26 RX ADMIN — Medication 1 APPLICATION(S): at 09:47

## 2019-09-26 RX ADMIN — SERTRALINE 150 MILLIGRAM(S): 25 TABLET, FILM COATED ORAL at 11:13

## 2019-09-26 RX ADMIN — CHLORHEXIDINE GLUCONATE 15 MILLILITER(S): 213 SOLUTION TOPICAL at 17:55

## 2019-09-26 RX ADMIN — Medication 1 APPLICATION(S): at 00:00

## 2019-09-26 RX ADMIN — Medication 1 APPLICATION(S): at 17:55

## 2019-09-26 RX ADMIN — Medication 1 APPLICATION(S): at 02:39

## 2019-09-26 NOTE — PROGRESS NOTE ADULT - MS EXT PE MLT D E PC
no cyanosis/no clubbing
no pedal edema/no cyanosis
no pedal edema/no cyanosis/no clubbing
no cyanosis/pedal edema
no clubbing/no cyanosis
no cyanosis

## 2019-09-26 NOTE — PROGRESS NOTE ADULT - EXTREMITIES
detailed exam
No cyanosis, clubbing or edema
detailed exam
No cyanosis, clubbing or edema
detailed exam
detailed exam

## 2019-09-27 LAB
ANION GAP SERPL CALC-SCNC: 3 MMOL/L — LOW (ref 5–17)
BUN SERPL-MCNC: 27 MG/DL — HIGH (ref 7–23)
CALCIUM SERPL-MCNC: 8.6 MG/DL — SIGNIFICANT CHANGE UP (ref 8.5–10.1)
CHLORIDE SERPL-SCNC: 103 MMOL/L — SIGNIFICANT CHANGE UP (ref 96–108)
CO2 SERPL-SCNC: 32 MMOL/L — HIGH (ref 22–31)
CREAT SERPL-MCNC: 0.59 MG/DL — SIGNIFICANT CHANGE UP (ref 0.5–1.3)
GLUCOSE SERPL-MCNC: 119 MG/DL — HIGH (ref 70–99)
HCT VFR BLD CALC: 23.2 % — LOW (ref 34.5–45)
HGB BLD-MCNC: 7.4 G/DL — LOW (ref 11.5–15.5)
MAGNESIUM SERPL-MCNC: 2.2 MG/DL — SIGNIFICANT CHANGE UP (ref 1.6–2.6)
MCHC RBC-ENTMCNC: 28.9 PG — SIGNIFICANT CHANGE UP (ref 27–34)
MCHC RBC-ENTMCNC: 31.9 GM/DL — LOW (ref 32–36)
MCV RBC AUTO: 90.6 FL — SIGNIFICANT CHANGE UP (ref 80–100)
PHOSPHATE SERPL-MCNC: 3.9 MG/DL — SIGNIFICANT CHANGE UP (ref 2.5–4.5)
PLATELET # BLD AUTO: 204 K/UL — SIGNIFICANT CHANGE UP (ref 150–400)
POTASSIUM SERPL-MCNC: 4.6 MMOL/L — SIGNIFICANT CHANGE UP (ref 3.5–5.3)
POTASSIUM SERPL-SCNC: 4.6 MMOL/L — SIGNIFICANT CHANGE UP (ref 3.5–5.3)
RBC # BLD: 2.56 M/UL — LOW (ref 3.8–5.2)
RBC # FLD: 13.8 % — SIGNIFICANT CHANGE UP (ref 10.3–14.5)
SODIUM SERPL-SCNC: 138 MMOL/L — SIGNIFICANT CHANGE UP (ref 135–145)
WBC # BLD: 11.49 K/UL — HIGH (ref 3.8–10.5)
WBC # FLD AUTO: 11.49 K/UL — HIGH (ref 3.8–10.5)

## 2019-09-27 PROCEDURE — 99233 SBSQ HOSP IP/OBS HIGH 50: CPT

## 2019-09-27 RX ADMIN — Medication 1 APPLICATION(S): at 09:48

## 2019-09-27 RX ADMIN — CHLORHEXIDINE GLUCONATE 15 MILLILITER(S): 213 SOLUTION TOPICAL at 17:18

## 2019-09-27 RX ADMIN — Medication 650 MILLIGRAM(S): at 00:00

## 2019-09-27 RX ADMIN — LACOSAMIDE 100 MILLIGRAM(S): 50 TABLET ORAL at 17:18

## 2019-09-27 RX ADMIN — Medication 1 APPLICATION(S): at 23:04

## 2019-09-27 RX ADMIN — Medication 50 MILLIGRAM(S): at 23:05

## 2019-09-27 RX ADMIN — Medication 1 APPLICATION(S): at 03:31

## 2019-09-27 RX ADMIN — Medication 1 APPLICATION(S): at 11:49

## 2019-09-27 RX ADMIN — Medication 2 PACKET(S): at 05:48

## 2019-09-27 RX ADMIN — CHLORHEXIDINE GLUCONATE 15 MILLILITER(S): 213 SOLUTION TOPICAL at 05:48

## 2019-09-27 RX ADMIN — Medication 1 APPLICATION(S): at 00:00

## 2019-09-27 RX ADMIN — SERTRALINE 150 MILLIGRAM(S): 25 TABLET, FILM COATED ORAL at 11:49

## 2019-09-27 RX ADMIN — LACOSAMIDE 100 MILLIGRAM(S): 50 TABLET ORAL at 05:48

## 2019-09-27 RX ADMIN — PANTOPRAZOLE SODIUM 40 MILLIGRAM(S): 20 TABLET, DELAYED RELEASE ORAL at 11:49

## 2019-09-27 RX ADMIN — Medication 1 APPLICATION(S): at 05:48

## 2019-09-27 RX ADMIN — Medication 1 APPLICATION(S): at 23:05

## 2019-09-27 RX ADMIN — ENOXAPARIN SODIUM 40 MILLIGRAM(S): 100 INJECTION SUBCUTANEOUS at 11:48

## 2019-09-27 RX ADMIN — Medication 81 MILLIGRAM(S): at 11:49

## 2019-09-27 RX ADMIN — Medication 650 MILLIGRAM(S): at 17:18

## 2019-09-28 RX ORDER — ROBINUL 0.2 MG/ML
0.2 INJECTION INTRAMUSCULAR; INTRAVENOUS EVERY 4 HOURS
Refills: 0 | Status: DISCONTINUED | OUTPATIENT
Start: 2019-09-28 | End: 2019-09-30

## 2019-09-28 RX ORDER — MORPHINE SULFATE 50 MG/1
6 CAPSULE, EXTENDED RELEASE ORAL
Qty: 100 | Refills: 0 | Status: DISCONTINUED | OUTPATIENT
Start: 2019-09-28 | End: 2019-09-30

## 2019-09-28 RX ORDER — SCOPALAMINE 1 MG/3D
1 PATCH, EXTENDED RELEASE TRANSDERMAL
Refills: 0 | Status: DISCONTINUED | OUTPATIENT
Start: 2019-09-28 | End: 2019-09-30

## 2019-09-28 RX ORDER — MORPHINE SULFATE 50 MG/1
2 CAPSULE, EXTENDED RELEASE ORAL
Refills: 0 | Status: DISCONTINUED | OUTPATIENT
Start: 2019-09-28 | End: 2019-09-30

## 2019-09-28 RX ADMIN — SCOPALAMINE 1 PATCH: 1 PATCH, EXTENDED RELEASE TRANSDERMAL at 14:27

## 2019-09-28 RX ADMIN — LACOSAMIDE 100 MILLIGRAM(S): 50 TABLET ORAL at 05:56

## 2019-09-28 RX ADMIN — MORPHINE SULFATE 2 MILLIGRAM(S): 50 CAPSULE, EXTENDED RELEASE ORAL at 20:29

## 2019-09-28 RX ADMIN — CHLORHEXIDINE GLUCONATE 15 MILLILITER(S): 213 SOLUTION TOPICAL at 05:16

## 2019-09-28 RX ADMIN — MORPHINE SULFATE 2 MILLIGRAM(S): 50 CAPSULE, EXTENDED RELEASE ORAL at 13:11

## 2019-09-28 RX ADMIN — Medication 1 APPLICATION(S): at 08:33

## 2019-09-28 RX ADMIN — Medication 1 APPLICATION(S): at 05:56

## 2019-09-28 RX ADMIN — MORPHINE SULFATE 2 MILLIGRAM(S): 50 CAPSULE, EXTENDED RELEASE ORAL at 14:29

## 2019-09-28 RX ADMIN — Medication 2 MILLIGRAM(S): at 11:15

## 2019-09-28 RX ADMIN — Medication 2 MILLIGRAM(S): at 15:02

## 2019-09-28 RX ADMIN — Medication 2 MILLIGRAM(S): at 16:32

## 2019-09-28 RX ADMIN — ROBINUL 0.2 MILLIGRAM(S): 0.2 INJECTION INTRAMUSCULAR; INTRAVENOUS at 16:30

## 2019-09-28 RX ADMIN — ROBINUL 0.2 MILLIGRAM(S): 0.2 INJECTION INTRAMUSCULAR; INTRAVENOUS at 11:47

## 2019-09-28 RX ADMIN — MORPHINE SULFATE 4 MG/HR: 50 CAPSULE, EXTENDED RELEASE ORAL at 11:48

## 2019-09-28 RX ADMIN — MORPHINE SULFATE 2 MILLIGRAM(S): 50 CAPSULE, EXTENDED RELEASE ORAL at 15:02

## 2019-09-28 RX ADMIN — MORPHINE SULFATE 4 MG/HR: 50 CAPSULE, EXTENDED RELEASE ORAL at 11:16

## 2019-09-28 RX ADMIN — MORPHINE SULFATE 2 MILLIGRAM(S): 50 CAPSULE, EXTENDED RELEASE ORAL at 11:48

## 2019-09-28 RX ADMIN — Medication 2 MILLIGRAM(S): at 18:19

## 2019-09-28 RX ADMIN — MORPHINE SULFATE 2 MILLIGRAM(S): 50 CAPSULE, EXTENDED RELEASE ORAL at 18:19

## 2019-09-28 RX ADMIN — MORPHINE SULFATE 2 MILLIGRAM(S): 50 CAPSULE, EXTENDED RELEASE ORAL at 15:09

## 2019-09-28 RX ADMIN — ROBINUL 0.2 MILLIGRAM(S): 0.2 INJECTION INTRAMUSCULAR; INTRAVENOUS at 20:29

## 2019-09-28 RX ADMIN — Medication 2 MILLIGRAM(S): at 22:08

## 2019-09-28 RX ADMIN — MORPHINE SULFATE 2 MILLIGRAM(S): 50 CAPSULE, EXTENDED RELEASE ORAL at 22:04

## 2019-09-28 RX ADMIN — MORPHINE SULFATE 2 MILLIGRAM(S): 50 CAPSULE, EXTENDED RELEASE ORAL at 11:40

## 2019-09-28 RX ADMIN — SCOPALAMINE 1 PATCH: 1 PATCH, EXTENDED RELEASE TRANSDERMAL at 18:07

## 2019-09-28 NOTE — DISCHARGE NOTE FOR THE EXPIRED PATIENT - HOSPITAL COURSE
The patient was an 80 yo female with Hx. of Schizoaffective disorder, who was sent to the ER form [facility] because of medication refusal and exacerbation of schizoaffective symptoms patient has been refusing medications, refusing to answer questions or speak, reporting that people are following her home and that men are sneaking into her room, removing her cloth in public. She was dx with a UTI and hyponatremia.  The evening prior to D/C she aspirated on her dinner and sustained a hypoxic cardiac arrest.  She was down for about 10 minutes.  After ROSC she was taken to the CCU.  She underwent TTM protocol.  After rewarming she was left unresponsive and with myoclonic movements.     EEG neg for seizures, MRI with anoxic changes to B/L basal ganglion.      Family decided to terminally wean in accordance with the patients known prior wishes.

## 2019-09-29 RX ADMIN — MORPHINE SULFATE 2 MILLIGRAM(S): 50 CAPSULE, EXTENDED RELEASE ORAL at 09:30

## 2019-09-29 RX ADMIN — ROBINUL 0.2 MILLIGRAM(S): 0.2 INJECTION INTRAMUSCULAR; INTRAVENOUS at 14:22

## 2019-09-29 RX ADMIN — MORPHINE SULFATE 2 MILLIGRAM(S): 50 CAPSULE, EXTENDED RELEASE ORAL at 11:04

## 2019-09-29 RX ADMIN — MORPHINE SULFATE 2 MILLIGRAM(S): 50 CAPSULE, EXTENDED RELEASE ORAL at 17:04

## 2019-09-29 RX ADMIN — MORPHINE SULFATE 2 MILLIGRAM(S): 50 CAPSULE, EXTENDED RELEASE ORAL at 22:50

## 2019-09-29 RX ADMIN — Medication 2 MILLIGRAM(S): at 11:04

## 2019-09-29 RX ADMIN — Medication 2 MILLIGRAM(S): at 07:38

## 2019-09-29 RX ADMIN — MORPHINE SULFATE 4 MG/HR: 50 CAPSULE, EXTENDED RELEASE ORAL at 01:47

## 2019-09-29 RX ADMIN — MORPHINE SULFATE 2 MILLIGRAM(S): 50 CAPSULE, EXTENDED RELEASE ORAL at 14:35

## 2019-09-29 RX ADMIN — MORPHINE SULFATE 2 MILLIGRAM(S): 50 CAPSULE, EXTENDED RELEASE ORAL at 17:12

## 2019-09-29 RX ADMIN — Medication 2 MILLIGRAM(S): at 17:39

## 2019-09-29 RX ADMIN — Medication 2 MILLIGRAM(S): at 05:35

## 2019-09-29 RX ADMIN — Medication 2 MILLIGRAM(S): at 23:21

## 2019-09-29 RX ADMIN — ROBINUL 0.2 MILLIGRAM(S): 0.2 INJECTION INTRAMUSCULAR; INTRAVENOUS at 01:47

## 2019-09-29 RX ADMIN — ROBINUL 0.2 MILLIGRAM(S): 0.2 INJECTION INTRAMUSCULAR; INTRAVENOUS at 17:11

## 2019-09-29 RX ADMIN — ROBINUL 0.2 MILLIGRAM(S): 0.2 INJECTION INTRAMUSCULAR; INTRAVENOUS at 05:35

## 2019-09-29 RX ADMIN — MORPHINE SULFATE 2 MILLIGRAM(S): 50 CAPSULE, EXTENDED RELEASE ORAL at 23:22

## 2019-09-29 RX ADMIN — MORPHINE SULFATE 2 MILLIGRAM(S): 50 CAPSULE, EXTENDED RELEASE ORAL at 22:20

## 2019-09-29 RX ADMIN — MORPHINE SULFATE 2 MILLIGRAM(S): 50 CAPSULE, EXTENDED RELEASE ORAL at 14:32

## 2019-09-29 RX ADMIN — Medication 2 MILLIGRAM(S): at 14:21

## 2019-09-29 RX ADMIN — Medication 2 MILLIGRAM(S): at 16:22

## 2019-09-29 RX ADMIN — MORPHINE SULFATE 2 MILLIGRAM(S): 50 CAPSULE, EXTENDED RELEASE ORAL at 11:43

## 2019-09-29 RX ADMIN — ROBINUL 0.2 MILLIGRAM(S): 0.2 INJECTION INTRAMUSCULAR; INTRAVENOUS at 21:24

## 2019-09-29 RX ADMIN — MORPHINE SULFATE 2 MILLIGRAM(S): 50 CAPSULE, EXTENDED RELEASE ORAL at 05:35

## 2019-09-29 RX ADMIN — MORPHINE SULFATE 2 MILLIGRAM(S): 50 CAPSULE, EXTENDED RELEASE ORAL at 21:49

## 2019-09-29 RX ADMIN — SCOPALAMINE 1 PATCH: 1 PATCH, EXTENDED RELEASE TRANSDERMAL at 07:04

## 2019-09-29 RX ADMIN — MORPHINE SULFATE 2 MILLIGRAM(S): 50 CAPSULE, EXTENDED RELEASE ORAL at 07:37

## 2019-09-29 RX ADMIN — ROBINUL 0.2 MILLIGRAM(S): 0.2 INJECTION INTRAMUSCULAR; INTRAVENOUS at 09:30

## 2019-09-29 RX ADMIN — SCOPALAMINE 1 PATCH: 1 PATCH, EXTENDED RELEASE TRANSDERMAL at 17:12

## 2019-09-29 NOTE — PROGRESS NOTE ADULT - CARDIOVASCULAR
detailed exam
Regular rate & rhythm, normal S1, S2; no murmurs, gallops or rubs; no S3, S4
Regular rate & rhythm, normal S1, S2; no murmurs, gallops or rubs; no S3, S4
detailed exam
detailed exam

## 2019-09-29 NOTE — PROGRESS NOTE ADULT - SUBJECTIVE AND OBJECTIVE BOX
· Reason for Admission	UTI, AMS , Schizophrenia.	      · Subjective and Objective: 	  · Reason for  	S/p Cardiac arrest Neurological evaluation	  9/24/19 : Pt on EEG monitoring for muscle twitching and last night had seizure activity rx with Vimpat. Now clinically no recurrent episodes noted. opens eyes to calling name but not follows commands.   9/25/19 : Pt unresponsive, completed 24 hr EEG monitoring. No recurrent seizures since on Vimpat. No new c/o. For DTI MRI today.   9/26/19 : Pt opens eyes , but not following commands, unresponsive to commands. No epileptic  activity but some twitching  noted.     MEDICATIONS  (STANDING):  aspirin  chewable 81 milliGRAM(s) Oral daily  chlorhexidine 0.12% Liquid 15 milliLiter(s) Oral Mucosa every 12 hours  enoxaparin Injectable 40 milliGRAM(s) SubCutaneous daily  lacosamide 100 milliGRAM(s) Oral two times a day  pantoprazole   Suspension 40 milliGRAM(s) Oral daily  petrolatum Ophthalmic Ointment 1 Application(s) Both EYES every 3 hours  potassium phosphate / sodium phosphate powder 2 Packet(s) Oral two times a day  propofol Infusion 10 MICROgram(s)/kG/Min (4.356 mL/Hr) IV Continuous <Continuous>  senna 2 Tablet(s) Oral at bedtime  sertraline 150 milliGRAM(s) Oral daily  traZODone 50 milliGRAM(s) Oral at bedtime      Vital Signs Last 24 Hrs  T(C): 37.2 (26 Sep 2019 05:00), Max: 38.7 (25 Sep 2019 20:10)  T(F): 99 (26 Sep 2019 05:00), Max: 101.6 (25 Sep 2019 20:10)  HR: 78 (26 Sep 2019 07:00) (74 - 116)  BP: 109/93 (26 Sep 2019 07:00) (94/61 - 159/97)  BP(mean): 97 (26 Sep 2019 07:00) (69 - 115)  RR: --  SpO2: 100% (26 Sep 2019 07:00) (95% - 100%)     Neurological exam: Limited Exam Pt on Ventilator, not on sedation.  HF:   CN: Pupils, pin point 1.5 mm no reactive, unable to visualize fundi , corneal response sluggish  bilaterally. Gag not tested  Motor: No spontaneous movements noted .   Sens: no response  Reflexes: Reflexes 0 bilaterally Plantars upgoing.  Coord:  Not able to assess   Gait/Balance: Cannot test                            7.8    11.44 )-----------( 202      ( 26 Sep 2019 06:31 )             23.6     09-26    137  |  102  |  22  ----------------------------<  115<H>  4.3   |  31  |  0.50    Ca    8.9      26 Sep 2019 06:31  Phos  3.4     09-26  Mg     2.0     09-26      Radiology report:  - MRI brain    < from: MR Head No Cont (09.25.19 @ 12:00) >  IMPRESSION:       Restricted diffusion with associated T2/FLAIR hyperintensity throughout   the bilateral basal ganglia and to a mild degree scattered within the   cortex of the bilateral cerebral hemispheres. These findings are   compatible with a global anoxic injury. No associated mass effector   hemorrhage.    - EEG  < from: EEG w/ Video Monitoring Each 24 hours (09.25.19 @ 08:30) >  Impression:  This is an abnormal EEG due to marketed generalized slow activity due to   voltage suppression  Intermittent sharp waves suggestive of was burst suppression. Underlying   epileptiform activity could not be ruled out even though no previously   described focal epileptiform activity not noted in this recording.    These findings maybe seen in the setting of anoxic encephalopathy.   Clinical correlation recommended.
· Reason for Admission	UTI, AMS , Schizophrenia.	      · Subjective and Objective: 	  · Reason for Referral/Consultation:	S/p Cardiac arrest Neurological evaluation	  9/24/19 : Pt on EEG monitoring for muscle twitching and last night had seizure activity rx with Vimpat. Now clinically no recurrent episodes noted. opens eyes to calling name but not follows commands.   9/25/19 : Pt unresponsive, completed 24 hr EEG monitoring. No recurrent seizures since on Vimpat. No new c/o. For DTI MRI today.     MEDICATIONS  (STANDING):  aspirin  chewable 81 milliGRAM(s) Oral daily  chlorhexidine 0.12% Liquid 15 milliLiter(s) Oral Mucosa every 12 hours  enoxaparin Injectable 40 milliGRAM(s) SubCutaneous daily  lacosamide 100 milliGRAM(s) Oral two times a day  pantoprazole   Suspension 40 milliGRAM(s) Oral daily  petrolatum Ophthalmic Ointment 1 Application(s) Both EYES every 3 hours  potassium phosphate / sodium phosphate powder 2 Packet(s) Oral two times a day  propofol Infusion 10 MICROgram(s)/kG/Min (4.356 mL/Hr) IV Continuous <Continuous>  senna 2 Tablet(s) Oral at bedtime  sertraline 150 milliGRAM(s) Oral daily  traZODone 50 milliGRAM(s) Oral at bedtime      Vital Signs Last 24 Hrs  T(C): 37.7 (25 Sep 2019 04:00), Max: 37.7 (25 Sep 2019 00:00)  T(F): 99.9 (25 Sep 2019 04:00), Max: 99.9 (25 Sep 2019 04:00)  HR: 112 (25 Sep 2019 09:00) (74 - 112)  BP: 160/105 (25 Sep 2019 08:00) (116/73 - 177/81)  BP(mean): 119 (25 Sep 2019 08:00) (84 - 123)  RR: --  SpO2: 100% (25 Sep 2019 09:00) (95% - 100%)     Neurological exam: Limited Exam Pt on Ventilator, not on sedation.  HF:   CN: Pupils, pin point 1.5 mm no reactive, unable to visualize fundi , corneal response sluggish  bilaterally. Gag not tested  Motor: No spontaneous movements noted .   Sens: no response  Reflexes: Reflexes 0 bilaterally Plantars .  Coord:  Not able to assess   Gait/Balance: Cannot test                       8.2    11.56 )-----------( 227      ( 25 Sep 2019 06:21 )             24.8     09-25    139  |  102  |  17  ----------------------------<  117<H>  4.1   |  29  |  0.49<L>    Ca    8.6      25 Sep 2019 06:21  Phos  3.7     09-25  Mg     1.8     09-25      Radiology report:  - CT Head  < from: CT Head No Cont (09.22.19 @ 10:44) >  IMPRESSION:    No acute intracranial hemorrhage.  - EEG  < from: EEG w/ Video Each 24 Hours (09.24.19 @ 10:00) >  EEG Summary/Classification:  This is an abnormal prolonged video EEG due to the presence of:  -Marked generalized slowing initially, followed by increased slowing with   voltage suppression.  -Sharp waves which are most frequent/prominent over the left temporal   region.  -Multiple reports of seizure like activity, at least two of which   correlate with runs of rhythmic activity in the right fronto-temporal   region with superimposed sharp waves.  EEG Impression/Clinical Correlate:  These findings suggest marked encephalopathy. Increased voltage   suppression may be related to sedation.  There is also evidence of a risk of seizures.   Although these  findings  may be seen in the setting ofanoxia, there is   also evidence of focal onset seizures which may suggest a more focal   insult.
· Subjective and Objective: 	  · Reason for Admission	UTI, AMS , Schizophrenia.	      · Subjective and Objective: 	  · Reason for  	S/p Cardiac arrest Neurological evaluation	  9/24/19 : Pt on EEG monitoring for muscle twitching and last night had seizure activity rx with Vimpat. Now clinically no recurrent episodes noted. opens eyes to calling name but not follows commands.   9/25/19 : Pt unresponsive, completed 24 hr EEG monitoring. No recurrent seizures since on Vimpat. No new c/o. For DTI MRI today.   9/26/19 : Pt opens eyes , but not following commands, unresponsive to commands. No epileptic  activity but some twitching  noted.   9/27/19 : Pt remains unchanged. On vent, not on sedation, unresponsive ,intermittent twitching noted.    MEDICATIONS  (STANDING):  aspirin  chewable 81 milliGRAM(s) Oral daily  chlorhexidine 0.12% Liquid 15 milliLiter(s) Oral Mucosa every 12 hours  enoxaparin Injectable 40 milliGRAM(s) SubCutaneous daily  lacosamide 100 milliGRAM(s) Oral two times a day  pantoprazole   Suspension 40 milliGRAM(s) Oral daily  petrolatum Ophthalmic Ointment 1 Application(s) Both EYES every 3 hours  propofol Infusion 10 MICROgram(s)/kG/Min (4.356 mL/Hr) IV Continuous <Continuous>  senna 2 Tablet(s) Oral at bedtime  sertraline 150 milliGRAM(s) Oral daily  traZODone 50 milliGRAM(s) Oral at bedtime      Vital Signs Last 24 Hrs  T(C): 36.9 (27 Sep 2019 08:53), Max: 37.9 (27 Sep 2019 00:54)  T(F): 98.5 (27 Sep 2019 08:53), Max: 100.2 (27 Sep 2019 00:54)  HR: 70 (27 Sep 2019 08:00) (70 - 104)  BP: 100/67 (27 Sep 2019 07:00) (100/67 - 163/105)  BP(mean): 74 (27 Sep 2019 07:00) (53 - 121)  RR: --  SpO2: 100% (27 Sep 2019 08:00) (97% - 100%)        Neurological exam: Limited Exam Pt on Ventilator, not on sedation.  HF:   CN: Pupils, 3 mm no reactive,  corneal response sluggish  bilaterally. Gag not tested  Motor: Intermittent twitching, no spontaneous movements noted   Sens: no response  Reflexes: Reflexes 0 bilaterally Plantars upgoing.  Coord:  Not able to assess   Gait/Balance: Cannot test                       7.4    11.49 )-----------( 204      ( 27 Sep 2019 07:02 )             23.2     09-27    138  |  103  |  27<H>  ----------------------------<  119<H>  4.6   |  32<H>  |  0.59    Ca    8.6      27 Sep 2019 07:02  Phos  3.9     09-27  Mg     2.2     09-27      Radiology report:  - MRI brain    < from: MR Head No Cont (09.25.19 @ 12:00) >  IMPRESSION:       Restricted diffusion with associated T2/FLAIR hyperintensity throughout   the bilateral basal ganglia and to a mild degree scattered within the   cortex of the bilateral cerebral hemispheres. These findings are   compatible with a global anoxic injury. No associated mass effector   hemorrhage.    - EEG  < from: EEG w/ Video Monitoring Each 24 hours (09.25.19 @ 08:30) >  Impression:  This is an abnormal EEG due to marketed generalized slow activity due to   voltage suppression  Intermittent sharp waves suggestive of was burst suppression. Underlying   epileptiform activity could not be ruled out even though no previously   described focal epileptiform activity not noted in this recording.    These findings maybe seen in the setting of anoxic encephalopathy.   Clinical correlation recommended.
79y old F with a PMHx of Schizoaffective disorder, who was sent to the ER form [facility] because of medication refusal, exacerbation of schizoaffective symptoms.  Pt sustained cardiac arrest on 9/17.  Food noted in back of mouth during intubation.  Down time at least 10 mins in the hospital.      9/18:  CCU D # 2.  Vented.  sedated.  cooled.  On phenyl gtt.  myoclonic jerks noted.  Poor prognosis for meaningful neuro recovery    9/19:  CCU D # 3.  Vented.  Sedated.  Warming phase.  myoclonic jerks noted.  TTE (9/18)--EF 60%/decreased compliance/RV OK/2+ tr    9/20:  CCU D # 4.  Completed protocol.  Not responsive.  Myoclonic jerks noted.  Remains on low dose phenyl gtt    9/21 - Patient seen and examined. Chart reviewed. Events noted. Remains on the vent. Off sedation.     9/22 - Overnight events noted. Remains on the vent. Has not received sedation x 2-days.     9/23: Patient remains vented unresponsive but posturing to pain.      9/24: Vented, and with seizure activity over night.      9/25: No events over night, periods of twitching.       9/26: The patients condition remains unchanged.  Unresponsive and twitching.      9/27: No events over night.  Still with twitching.  Plan for a family meeting at 9 Am on 9/28            MEDICATIONS  (STANDING):  aspirin  chewable 81 milliGRAM(s) Oral daily  chlorhexidine 0.12% Liquid 15 milliLiter(s) Oral Mucosa every 12 hours  enoxaparin Injectable 40 milliGRAM(s) SubCutaneous daily  lacosamide 100 milliGRAM(s) Oral two times a day  pantoprazole   Suspension 40 milliGRAM(s) Oral daily  petrolatum Ophthalmic Ointment 1 Application(s) Both EYES every 3 hours  propofol Infusion 10 MICROgram(s)/kG/Min (4.356 mL/Hr) IV Continuous <Continuous>  senna 2 Tablet(s) Oral at bedtime  sertraline 150 milliGRAM(s) Oral daily  traZODone 50 milliGRAM(s) Oral at bedtime    MEDICATIONS  (PRN):  acetaminophen   Tablet .. 650 milliGRAM(s) Oral every 6 hours PRN Temp greater or equal to 38C (100.4F), Mild Pain (1 - 3)      DVT Prophylaxis: Lovenox    Advanced Directives:  Discussed with:    Visit Information: 30 min    ** Time is exclusive of billed procedures and/or teaching and/or routine family updates.
79y old F with a PMHx of Schizoaffective disorder, who was sent to the ER form [facility] because of medication refusal, exacerbation of schizoaffective symptoms.  Pt sustained cardiac arrest on .  Food noted in back of mouth during intubation.  Down time at least 10 mins in the hospital.      :  CCU D # 2.  Vented.  sedated.  cooled.  On phenyl gtt.  myoclonic jerks noted.  Poor prognosis for meaningful neuro recovery    :  CCU D # 3.  Vented.  Sedated.  Warming phase.  myoclonic jerks noted.  TTE ()--EF 60%/decreased compliance/RV OK/2+ tr    :  CCU D # 4.  Completed protocol.  Not responsive.  Myoclonic jerks noted.  Remains on low dose phenyl gtt     - Patient seen and examined. Chart reviewed. Events noted. Remains on the vent. Off sedation.      - Overnight events noted. Remains on the vent. Has not received sedation x 2-days.     : Patient remains vented unresponsive but posturing to pain.      : Vented, and with seizure activity over night.      PAST MEDICAL & SURGICAL HISTORY:  Schizophrenia  Breast cancer  No significant past surgical history      FAMILY HISTORY:  No pertinent family history in first degree relatives: parents , does not know parents medical history      Social Hx:    Allergies    Keppra (Rash)  PT ALLERGIC TO THE GENERIC OF ZYPREXA (Rash)    Intolerances            ICU Vital Signs Last 24 Hrs  T(C): 37.2 (24 Sep 2019 05:35), Max: 38 (24 Sep 2019 00:52)  T(F): 98.9 (24 Sep 2019 05:35), Max: 100.4 (24 Sep 2019 00:52)  HR: 71 (24 Sep 2019 06:00) (62 - 98)  BP: 100/59 (24 Sep 2019 06:00) (91/41 - 156/67)  BP(mean): 69 (24 Sep 2019 06:00) (51 - 113)  ABP: --  ABP(mean): --  RR: --  SpO2: 100% (24 Sep 2019 06:00) (100% - 100%)      Mode: AC/ CMV (Assist Control/ Continuous Mandatory Ventilation)  RR (machine): 12  TV (machine): 450  FiO2: 40  PEEP: 5  ITime: 1  PIP: 23      I&O's Summary    23 Sep 2019 07:01  -  24 Sep 2019 07:00  --------------------------------------------------------  IN: 0 mL / OUT: 1050 mL / NET: -1050 mL                              7.2    6.86  )-----------( 173      ( 24 Sep 2019 06:26 )             22.3       09-24    140  |  106  |  22  ----------------------------<  113<H>  4.7   |  28  |  0.61    Ca    8.4<L>      24 Sep 2019 06:26  Phos  2.4       Mg     1.9     -                      MEDICATIONS  (STANDING):  aspirin  chewable 81 milliGRAM(s) Oral daily  chlorhexidine 0.12% Liquid 15 milliLiter(s) Oral Mucosa every 12 hours  enoxaparin Injectable 40 milliGRAM(s) SubCutaneous daily  lacosamide 100 milliGRAM(s) Oral two times a day  pantoprazole   Suspension 40 milliGRAM(s) Oral daily  petrolatum Ophthalmic Ointment 1 Application(s) Both EYES every 3 hours  piperacillin/tazobactam IVPB.. 3.375 Gram(s) IV Intermittent every 8 hours  potassium phosphate / sodium phosphate powder 2 Packet(s) Oral three times a day  propofol Infusion 10 MICROgram(s)/kG/Min (4.356 mL/Hr) IV Continuous <Continuous>  senna 2 Tablet(s) Oral at bedtime  sertraline 150 milliGRAM(s) Oral daily  traZODone 50 milliGRAM(s) Oral at bedtime    MEDICATIONS  (PRN):  acetaminophen   Tablet .. 650 milliGRAM(s) Oral every 6 hours PRN Temp greater or equal to 38C (100.4F), Mild Pain (1 - 3)      DVT Prophylaxis: Lovenox    Advanced Directives:  Discussed with:    Visit Information: 30 min    ** Time is exclusive of billed procedures and/or teaching and/or routine family updates.
79y old F with a PMHx of Schizoaffective disorder, who was sent to the ER form [facility] because of medication refusal, exacerbation of schizoaffective symptoms.  Pt sustained cardiac arrest on .  Food noted in back of mouth during intubation.  Down time at least 10 mins in the hospital.      :  CCU D # 2.  Vented.  sedated.  cooled.  On phenyl gtt.  myoclonic jerks noted.  Poor prognosis for meaningful neuro recovery    :  CCU D # 3.  Vented.  Sedated.  Warming phase.  myoclonic jerks noted.  TTE ()--EF 60%/decreased compliance/RV OK/2+ tr    :  CCU D # 4.  Completed protocol.  Not responsive.  Myoclonic jerks noted.  Remains on low dose phenyl gtt     - Patient seen and examined. Chart reviewed. Events noted. Remains on the vent. Off sedation.      - Overnight events noted. Remains on the vent. Has not received sedation x 2-days.     : Patient remains vented unresponsive but posturing to pain.      : Vented, and with seizure activity over night.      : No events over night, periods of twitching.         PAST MEDICAL & SURGICAL HISTORY:  Schizophrenia  Breast cancer  No significant past surgical history      FAMILY HISTORY:  No pertinent family history in first degree relatives: parents , does not know parents medical history      Social Hx:    Allergies    Keppra (Rash)  PT ALLERGIC TO THE GENERIC OF ZYPREXA (Rash)    Intolerances            ICU Vital Signs Last 24 Hrs  T(C): 37.7 (25 Sep 2019 04:00), Max: 37.7 (25 Sep 2019 00:00)  T(F): 99.9 (25 Sep 2019 04:00), Max: 99.9 (25 Sep 2019 04:00)  HR: 84 (25 Sep 2019 10:30) (82 - 112)  BP: 144/64 (25 Sep 2019 10:30) (116/73 - 177/81)  BP(mean): 119 (25 Sep 2019 08:00) (84 - 123)  ABP: --  ABP(mean): --  RR: --  SpO2: 99% (25 Sep 2019 10:30) (95% - 100%)      Mode: AC/ CMV (Assist Control/ Continuous Mandatory Ventilation)  RR (machine): 12  TV (machine): 450  FiO2: 40  PEEP: 5  ITime: 0.1  PIP: 27      I&O's Summary    24 Sep 2019 07:01  -  25 Sep 2019 07:00  --------------------------------------------------------  IN: 900 mL / OUT: 550 mL / NET: 350 mL                              8.2    11.56 )-----------( 227      ( 25 Sep 2019 06:21 )             24.8       09-    139  |  102  |  17  ----------------------------<  117<H>  4.1   |  29  |  0.49<L>    Ca    8.6      25 Sep 2019 06:21  Phos  3.7     09-25  Mg     1.8     -25          MEDICATIONS  (STANDING):  aspirin  chewable 81 milliGRAM(s) Oral daily  chlorhexidine 0.12% Liquid 15 milliLiter(s) Oral Mucosa every 12 hours  enoxaparin Injectable 40 milliGRAM(s) SubCutaneous daily  lacosamide 100 milliGRAM(s) Oral two times a day  pantoprazole   Suspension 40 milliGRAM(s) Oral daily  petrolatum Ophthalmic Ointment 1 Application(s) Both EYES every 3 hours  potassium phosphate / sodium phosphate powder 2 Packet(s) Oral two times a day  propofol Infusion 10 MICROgram(s)/kG/Min (4.356 mL/Hr) IV Continuous <Continuous>  senna 2 Tablet(s) Oral at bedtime  sertraline 150 milliGRAM(s) Oral daily  traZODone 50 milliGRAM(s) Oral at bedtime    MEDICATIONS  (PRN):  acetaminophen   Tablet .. 650 milliGRAM(s) Oral every 6 hours PRN Temp greater or equal to 38C (100.4F), Mild Pain (1 - 3)      DVT Prophylaxis: Lovenox    Advanced Directives:  Discussed with:    Visit Information: 30    ** Time is exclusive of billed procedures and/or teaching and/or routine family updates.
79y old F with a PMHx of Schizoaffective disorder, who was sent to the ER form [facility] because of medication refusal, exacerbation of schizoaffective symptoms.  Pt sustained cardiac arrest on .  Food noted in back of mouth during intubation.  Down time at least 10 mins in the hospital.      :  CCU D # 2.  Vented.  sedated.  cooled.  On phenyl gtt.  myoclonic jerks noted.  Poor prognosis for meaningful neuro recovery    :  CCU D # 3.  Vented.  Sedated.  Warming phase.  myoclonic jerks noted.  TTE ()--EF 60%/decreased compliance/RV OK/2+ tr    :  CCU D # 4.  Completed protocol.  Not responsive.  Myoclonic jerks noted.  Remains on low dose phenyl gtt     - Patient seen and examined. Chart reviewed. Events noted. Remains on the vent. Off sedation.      - Overnight events noted. Remains on the vent. Has not received sedation x 2-days.     : Patient remains vented unresponsive but posturing to pain.      : Vented, and with seizure activity over night.      : No events over night, periods of twitching.       : The patients condition remains unchanged.  Unresponsive and twitching.      PAST MEDICAL & SURGICAL HISTORY:  Schizophrenia  Breast cancer  No significant past surgical history      FAMILY HISTORY:  No pertinent family history in first degree relatives: parents , does not know parents medical history      Social Hx:    Allergies    Keppra (Rash)  PT ALLERGIC TO THE GENERIC OF ZYPREXA (Rash)    Intolerances            ICU Vital Signs Last 24 Hrs  T(C): 37.2 (26 Sep 2019 05:00), Max: 38.7 (25 Sep 2019 20:10)  T(F): 99 (26 Sep 2019 05:00), Max: 101.6 (25 Sep 2019 20:10)  HR: 99 (26 Sep 2019 09:00) (74 - 116)  BP: 139/91 (26 Sep 2019 09:00) (94/61 - 159/97)  BP(mean): 106 (26 Sep 2019 08:00) (69 - 115)  ABP: --  ABP(mean): --  RR: --  SpO2: 100% (26 Sep 2019 09:00) (95% - 100%)      Mode: AC/ CMV (Assist Control/ Continuous Mandatory Ventilation)  RR (machine): 12  TV (machine): 450  FiO2: 40  PEEP: 5  ITime: 1  PIP: 28      I&O's Summary    25 Sep 2019 07:01  -  26 Sep 2019 07:00  --------------------------------------------------------  IN: 676 mL / OUT: 0 mL / NET: 676 mL                              7.8    11.44 )-----------( 202      ( 26 Sep 2019 06:31 )             23.6       -    137  |  102  |  22  ----------------------------<  115<H>  4.3   |  31  |  0.50    Ca    8.9      26 Sep 2019 06:31  Phos  3.4       Mg     2.0                           MEDICATIONS  (STANDING):  aspirin  chewable 81 milliGRAM(s) Oral daily  chlorhexidine 0.12% Liquid 15 milliLiter(s) Oral Mucosa every 12 hours  enoxaparin Injectable 40 milliGRAM(s) SubCutaneous daily  lacosamide 100 milliGRAM(s) Oral two times a day  pantoprazole   Suspension 40 milliGRAM(s) Oral daily  petrolatum Ophthalmic Ointment 1 Application(s) Both EYES every 3 hours  potassium phosphate / sodium phosphate powder 2 Packet(s) Oral two times a day  propofol Infusion 10 MICROgram(s)/kG/Min (4.356 mL/Hr) IV Continuous <Continuous>  senna 2 Tablet(s) Oral at bedtime  sertraline 150 milliGRAM(s) Oral daily  traZODone 50 milliGRAM(s) Oral at bedtime    MEDICATIONS  (PRN):  acetaminophen   Tablet .. 650 milliGRAM(s) Oral every 6 hours PRN Temp greater or equal to 38C (100.4F), Mild Pain (1 - 3)      DVT Prophylaxis: Lovenox    Advanced Directives:  Discussed with:    Visit Information: 30 min    ** Time is exclusive of billed procedures and/or teaching and/or routine family updates.
CC:  Cardiac arrest    HPI:    78 yo female with Hx. of Schizoaffective disorder, who was sent to the ER form [facility] because of medication refusal , exacerbation of schizoaffective symptoms.  Pt sustained cardiac arrest on 9/17.  Food noted in back of mouth during intubation.  Down time at least 10 mins.      9/17:  CCU D # 2.  Vented.  sedated.  cooled.  On phenyl gtt.  myoclonic jerks noted.  Poor prognosis for meaningful neuro recovery    9/18:  CCU D # 3.  Vented.  Sedated.  Warming phase.  myoclonic jerks noted.  TTE (9/18)--EF 60%/decreased compliance/RV OK/2+ tr.        PMH:  As above.     PSH:  As above.     FH: Non Contributory other than those listed in HPI    Social History:      MEDICATIONS  (STANDING):  aspirin  chewable 81 milliGRAM(s) Oral daily  busPIRone 30 milliGRAM(s) Oral every 8 hours  chlorhexidine 0.12% Liquid 15 milliLiter(s) Oral Mucosa every 12 hours  enoxaparin Injectable 40 milliGRAM(s) SubCutaneous daily  pantoprazole  Injectable 40 milliGRAM(s) IV Push daily  petrolatum Ophthalmic Ointment 1 Application(s) Both EYES every 3 hours  phenylephrine    Infusion 0.1 MICROgram(s)/kG/Min (2.722 mL/Hr) IV Continuous <Continuous>  piperacillin/tazobactam IVPB.. 3.375 Gram(s) IV Intermittent every 8 hours  propofol Infusion 10 MICROgram(s)/kG/Min (4.356 mL/Hr) IV Continuous <Continuous>  senna 2 Tablet(s) Oral at bedtime  sertraline 150 milliGRAM(s) Oral daily  sodium chloride 0.9%. 1000 milliLiter(s) (75 mL/Hr) IV Continuous <Continuous>  traZODone 50 milliGRAM(s) Oral at bedtime    MEDICATIONS  (PRN):  acetaminophen   Tablet .. 650 milliGRAM(s) Oral every 6 hours PRN Temp greater or equal to 38C (100.4F), Mild Pain (1 - 3)  [id number]    Injectable 50 MICROGram(s) IV Push every 2 hours PRN shivering  LORazepam   Injectable 1 milliGRAM(s) IV Push every 2 hours PRN myoclonic jerks      Allergies: NKDA    ROS:  SEE BELOW        ICU Vital Signs Last 24 Hrs  T(C): 35.5 (19 Sep 2019 07:30), Max: 35.5 (19 Sep 2019 07:30)  T(F): 95.9 (19 Sep 2019 07:30), Max: 95.9 (19 Sep 2019 07:30)  HR: 84 (19 Sep 2019 07:30) (63 - 93)  BP: 141/46 (19 Sep 2019 07:30) (87/54 - 162/79)  BP(mean): 71 (19 Sep 2019 07:30) (56 - 157)  ABP: --  ABP(mean): --  RR: 16 (19 Sep 2019 07:30) (10 - 24)  SpO2: 97% (19 Sep 2019 07:30) (94% - 100%)      Mode: CPAP with PS  RR (machine): 15  TV (machine):   FiO2: 40  PEEP: 5  PS: 5  ITime: 1  PIP: 11      I&O's Summary    18 Sep 2019 07:01  -  19 Sep 2019 07:00  --------------------------------------------------------  IN: 2721 mL / OUT: 540 mL / NET: 2181 mL    19 Sep 2019 07:01  -  19 Sep 2019 08:09  --------------------------------------------------------  IN: 95 mL / OUT: 35 mL / NET: 60 mL        Physical Exam:  SEE BELOW                          12.0   22.92 )-----------( 155      ( 19 Sep 2019 06:18 )             34.8       09-19    129<L>  |  101  |  19  ----------------------------<  64<L>  4.4   |  15<L>  |  0.69    Ca    8.0<L>      19 Sep 2019 06:18  Phos  3.3     09-19  Mg     1.7     09-19        CARDIAC MARKERS ( 18 Sep 2019 06:49 )  0.415 ng/mL / x     / x     / x     / x      CARDIAC MARKERS ( 18 Sep 2019 02:01 )  0.580 ng/mL / x     / x     / x     / x            ABG - ( 17 Sep 2019 18:40 )  pH, Arterial: 7.32  pH, Blood: x     /  pCO2: 39    /  pO2: 88    / HCO3: 19    / Base Excess: -5.9  /  SaO2: 95                      DVT Prophylaxis:                                                            Contraindication:     Advanced Directives:    Discussed with:    Visit Information:  Time spent excluding procedure:  75 cc mins    ** Time is exclusive of billed procedures and/or teaching and/or routine family updates.
CC: On Vent    79y old F with a PMHx of Schizoaffective disorder, who was sent to the ER form [facility] because of medication refusal , exacerbation of schizoaffective symptoms.  Pt sustained cardiac arrest on .  Food noted in back of mouth during intubation.  Down time at least 10 mins in the hospital.      :  CCU D # 2.  Vented.  sedated.  cooled.  On phenyl gtt.  myoclonic jerks noted.  Poor prognosis for meaningful neuro recovery    :  CCU D # 3.  Vented.  Sedated.  Warming phase.  myoclonic jerks noted.  TTE ()--EF 60%/decreased compliance/RV OK/2+ tr    :  CCU D # 4.  Completed protocol.  Not responsive.  Myoclonic jerks noted.  Remains on low dose phenyl gtt     - Patient seen and examined. Chart reviewed. Events noted. Remains on the vent. Off sedation.      - Overnight events noted. Remains on the vent. Has not received sedation x 2-days.     PAST MEDICAL & SURGICAL HISTORY:  Schizophrenia  Breast cancer  No significant past surgical history      FAMILY HISTORY:  No pertinent family history in first degree relatives: parents , does not know parents medical history      Social Hx:    Allergies    Keppra (Rash)  PT ALLERGIC TO THE GENERIC OF ZYPREXA (Rash)    Intolerances        79y        ICU Vital Signs Last 24 Hrs  T(C): 38 (22 Sep 2019 06:00), Max: 38 (22 Sep 2019 06:00)  T(F): 100.4 (22 Sep 2019 06:00), Max: 100.4 (22 Sep 2019 06:00)  HR: 90 (22 Sep 2019 07:00) (80 - 111)  BP: 118/60 (22 Sep 2019 07:00) (118/60 - 184/71)  BP(mean): 75 (22 Sep 2019 07:00) (73 - 110)  ABP: --  ABP(mean): --  RR: 26 (22 Sep 2019 07:00) (19 - 34)  SpO2: 98% (22 Sep 2019 07:00) (97% - 100%)      Mode: CPAP with PS  FiO2: 40  PEEP: 5  PS: 5  ITime: 1  MAP: 6.7  PIP: 11      I&O's Summary    21 Sep 2019 07:01  -  22 Sep 2019 07:00  --------------------------------------------------------  IN: 910 mL / OUT: 700 mL / NET: 210 mL                              8.1    10.38 )-----------( 144      ( 21 Sep 2019 06:27 )             23.9       -    134<L>  |  105  |  14  ----------------------------<  134<H>  3.2<L>   |  18<L>  |  0.79    Ca    8.4<L>      21 Sep 2019 06:27  Phos  1.6       Mg     2.1             CAPILLARY BLOOD GLUCOSE                                MEDICATIONS  (STANDING):  aspirin  chewable 81 milliGRAM(s) Oral daily  busPIRone 30 milliGRAM(s) Oral every 8 hours  chlorhexidine 0.12% Liquid 15 milliLiter(s) Oral Mucosa every 12 hours  enoxaparin Injectable 40 milliGRAM(s) SubCutaneous daily  pantoprazole  Injectable 40 milliGRAM(s) IV Push daily  petrolatum Ophthalmic Ointment 1 Application(s) Both EYES every 3 hours  phenylephrine    Infusion 0.1 MICROgram(s)/kG/Min (2.722 mL/Hr) IV Continuous <Continuous>  piperacillin/tazobactam IVPB.. 3.375 Gram(s) IV Intermittent every 8 hours  propofol Infusion 10 MICROgram(s)/kG/Min (4.356 mL/Hr) IV Continuous <Continuous>  senna 2 Tablet(s) Oral at bedtime  sertraline 150 milliGRAM(s) Oral daily  traZODone 50 milliGRAM(s) Oral at bedtime    MEDICATIONS  (PRN):  acetaminophen   Tablet .. 650 milliGRAM(s) Oral every 6 hours PRN Temp greater or equal to 38C (100.4F), Mild Pain (1 - 3)  LORazepam   Injectable 1 milliGRAM(s) IV Push every 2 hours PRN myoclonic jerks              Advanced Directives:  Discussed with:    Visit Information:    30-min CC Time is exclusive of billed procedures and/or teaching and/or routine family updates.
CC: On Vent    79y old F with a PMHx of Schizoaffective disorder, who was sent to the ER form [facility] because of medication refusal , exacerbation of schizoaffective symptoms.  Pt sustained cardiac arrest on .  Food noted in back of mouth during intubation.  Down time at least 10 mins in the hospital.      :  CCU D # 2.  Vented.  sedated.  cooled.  On phenyl gtt.  myoclonic jerks noted.  Poor prognosis for meaningful neuro recovery    :  CCU D # 3.  Vented.  Sedated.  Warming phase.  myoclonic jerks noted.  TTE ()--EF 60%/decreased compliance/RV OK/2+ tr    :  CCU D # 4.  Completed protocol.  Not responsive.  Myoclonic jerks noted.  Remains on low dose phenyl gtt     - Patient seen and examined. Chart reviewed. Events noted. Remains on the vent. Off sedation.     PAST MEDICAL & SURGICAL HISTORY:  Schizophrenia  Breast cancer  No significant past surgical history      FAMILY HISTORY:  No pertinent family history in first degree relatives: parents , does not know parents medical history      Social Hx:    Allergies    Keppra (Rash)  PT ALLERGIC TO THE GENERIC OF ZYPREXA (Rash)    Intolerances        79y        ICU Vital Signs Last 24 Hrs  T(C): 37.9 (21 Sep 2019 07:00), Max: 38.2 (21 Sep 2019 05:00)  T(F): 100.2 (21 Sep 2019 07:00), Max: 100.8 (21 Sep 2019 05:00)  HR: 91 (21 Sep 2019 07:00) (78 - 104)  BP: 152/54 (21 Sep 2019 07:00) (107/41 - 152/54)  BP(mean): 74 (21 Sep 2019 07:00) (56 - 78)  ABP: --  ABP(mean): --  RR: 18 (21 Sep 2019 07:00) (18 - 29)  SpO2: 100% (21 Sep 2019 07:00) (100% - 100%)      Mode: CPAP with PS  FiO2: 40  PEEP: 5  PS: 5  ITime: 1  MAP: 11  PIP: 11      I&O's Summary    20 Sep 2019 07:01  -  21 Sep 2019 07:00  --------------------------------------------------------  IN: 986.4 mL / OUT: 1000 mL / NET: -13.6 mL                              8.1    10.38 )-----------( 144      ( 21 Sep 2019 06:27 )             23.9       -    134<L>  |  105  |  14  ----------------------------<  134<H>  3.2<L>   |  18<L>  |  0.79    Ca    8.4<L>      21 Sep 2019 06:27  Phos  1.6       Mg     2.1             CAPILLARY BLOOD GLUCOSE                                MEDICATIONS  (STANDING):  aspirin  chewable 81 milliGRAM(s) Oral daily  busPIRone 30 milliGRAM(s) Oral every 8 hours  chlorhexidine 0.12% Liquid 15 milliLiter(s) Oral Mucosa every 12 hours  enoxaparin Injectable 40 milliGRAM(s) SubCutaneous daily  pantoprazole  Injectable 40 milliGRAM(s) IV Push daily  petrolatum Ophthalmic Ointment 1 Application(s) Both EYES every 3 hours  phenylephrine    Infusion 0.1 MICROgram(s)/kG/Min (2.722 mL/Hr) IV Continuous <Continuous>  piperacillin/tazobactam IVPB.. 3.375 Gram(s) IV Intermittent every 8 hours  propofol Infusion 10 MICROgram(s)/kG/Min (4.356 mL/Hr) IV Continuous <Continuous>  senna 2 Tablet(s) Oral at bedtime  sertraline 150 milliGRAM(s) Oral daily  traZODone 50 milliGRAM(s) Oral at bedtime    MEDICATIONS  (PRN):  acetaminophen   Tablet .. 650 milliGRAM(s) Oral every 6 hours PRN Temp greater or equal to 38C (100.4F), Mild Pain (1 - 3)  LORazepam   Injectable 1 milliGRAM(s) IV Push every 2 hours PRN myoclonic jerks              Advanced Directives:  Discussed with:    Visit Information:    45-min CC  Time is exclusive of billed procedures and/or teaching and/or routine family updates.
Date of service: 19 @ 10:17    Lying in bed in NAD  Weak looking  Intubated on ventilatory support  Has low grade fever  ROS: unobtainable    MEDICATIONS  (STANDING):  aspirin  chewable 81 milliGRAM(s) Oral daily  busPIRone 30 milliGRAM(s) Oral every 8 hours  chlorhexidine 0.12% Liquid 15 milliLiter(s) Oral Mucosa every 12 hours  enoxaparin Injectable 40 milliGRAM(s) SubCutaneous daily  pantoprazole  Injectable 40 milliGRAM(s) IV Push daily  petrolatum Ophthalmic Ointment 1 Application(s) Both EYES every 3 hours  phenylephrine    Infusion 0.1 MICROgram(s)/kG/Min (2.722 mL/Hr) IV Continuous <Continuous>  piperacillin/tazobactam IVPB.. 3.375 Gram(s) IV Intermittent every 8 hours  propofol Infusion 10 MICROgram(s)/kG/Min (4.356 mL/Hr) IV Continuous <Continuous>  senna 2 Tablet(s) Oral at bedtime  sertraline 150 milliGRAM(s) Oral daily  sodium chloride 0.9%. 1000 milliLiter(s) (75 mL/Hr) IV Continuous <Continuous>  traZODone 50 milliGRAM(s) Oral at bedtime      Vital Signs Last 24 Hrs  T(C): 37.2 (20 Sep 2019 09:03), Max: 37.6 (20 Sep 2019 03:00)  T(F): 99 (20 Sep 2019 09:03), Max: 99.7 (20 Sep 2019 03:00)  HR: 78 (20 Sep 2019 08:06) (75 - 101)  BP: 111/47 (20 Sep 2019 08:00) (98/46 - 144/53)  BP(mean): 61 (20 Sep 2019 08:00) (56 - 72)  RR: 16 (20 Sep 2019 08:00) (14 - 25)  SpO2: 100% (20 Sep 2019 08:06) (100% - 100%)    Physical Exam:      Constitutional: frail looking  HEENT: NC/AT, EOMI, PERRLA, conjunctivae clear  Neck: supple; thyroid not palpable  Back: no tenderness  Respiratory: respiratory effort normal; b/l rhonchi  Cardiovascular: S1S2 regular, no murmurs  Abdomen: soft, not tender, not distended, positive BS  Genitourinary: no suprapubic tenderness  Lymphatic: no LN palpable  Musculoskeletal: no muscle tenderness, no joint swelling or tenderness  Extremities: no pedal edema  Neurological/ Psychiatric: confused, moving all extremities  Skin: no rashes; no palpable lesions    Labs: reviewed                        9.1    14. )-----------( 173      ( 20 Sep 2019 06:30 )             26.9     09-20    134<L>  |  105  |  21  ----------------------------<  72  3.7   |  17<L>  |  1.02    Ca    8.2<L>      20 Sep 2019 06:30  Phos  3.1     09-20  Mg     1.8     09-20                        12.0   22.92 )-----------( 155      ( 19 Sep 2019 06:18 )             34.8     09-19    129<L>  |  101  |  19  ----------------------------<  64<L>  4.4   |  15<L>  |  0.69    Ca    8.0<L>      19 Sep 2019 06:18  Phos  3.3     09-19  Mg     1.7     09-19    Urinalysis Basic - ( 14 Sep 2019 11:48 )    Color: Yellow / Appearance: Slightly Turbid / S.015 / pH: x  Gluc: x / Ketone: Negative  / Bili: Negative / Urobili: Negative mg/dL   Blood: x / Protein: Negative mg/dL / Nitrite: Positive   Leuk Esterase: Moderate / RBC: 3-5 /HPF / WBC 26-50   Sq Epi: x / Non Sq Epi: x / Bacteria: Many        Culture - Blood (collected 14 Sep 2019 13:03)  Source: .Blood None  Preliminary Report (15 Sep 2019 19:01):    No growth to date.    Culture - Blood (collected 14 Sep 2019 13:03)  Source: .Blood None  Preliminary Report (15 Sep 2019 19:01):    No growth to date.    Radiology: all available radiological tests reviewed    < from: Xray Chest 1 View AP/PA. (19 @ 17:27) >  Linear atelectasis at the right lung base.    < end of copied text >    < from: Xray Chest 1 View-PORTABLE IMMEDIATE (19 @ 17:48) >  Tubes in place. No gross lung consolidations or effusions.    < end of copied text >      Advanced directives addressed: full resuscitation
Date of service: 19 @ 11:58    Lying in bed in NAD  Events noted  s/p aspiration  Intubated on ventilatory support    ROS: unobtainable    MEDICATIONS  (STANDING):  aspirin  chewable 81 milliGRAM(s) Oral daily  busPIRone 30 milliGRAM(s) Oral every 8 hours  chlorhexidine 0.12% Liquid 15 milliLiter(s) Oral Mucosa every 12 hours  enoxaparin Injectable 40 milliGRAM(s) SubCutaneous daily  pantoprazole  Injectable 40 milliGRAM(s) IV Push daily  petrolatum Ophthalmic Ointment 1 Application(s) Both EYES every 3 hours  phenylephrine    Infusion 0.1 MICROgram(s)/kG/Min (2.722 mL/Hr) IV Continuous <Continuous>  piperacillin/tazobactam IVPB.. 3.375 Gram(s) IV Intermittent every 8 hours  propofol Infusion 10 MICROgram(s)/kG/Min (4.356 mL/Hr) IV Continuous <Continuous>  senna 2 Tablet(s) Oral at bedtime  sertraline 150 milliGRAM(s) Oral daily  sodium chloride 0.9%. 1000 milliLiter(s) (75 mL/Hr) IV Continuous <Continuous>  traZODone 50 milliGRAM(s) Oral at bedtime      Vital Signs Last 24 Hrs  T(C): 35.4 (19 Sep 2019 11:00), Max: 35.5 (19 Sep 2019 07:30)  T(F): 95.7 (19 Sep 2019 11:00), Max: 95.9 (19 Sep 2019 07:30)  HR: 77 (19 Sep 2019 11:33) (63 - 100)  BP: 108/45 (19 Sep 2019 11:33) (90/- - 163/78)  BP(mean): 60 (19 Sep 2019 11:33) (56 - 157)  RR: 15 (19 Sep 2019 11:00) (10 - 21)  SpO2: 100% (19 Sep 2019 11:33) (91% - 100%)    Physical Exam:      Constitutional: frail looking  HEENT: NC/AT, EOMI, PERRLA, conjunctivae clear  Neck: supple; thyroid not palpable  Back: no tenderness  Respiratory: respiratory effort normal; few rhonchi  Cardiovascular: S1S2 regular, no murmurs  Abdomen: soft, not tender, not distended, positive BS  Genitourinary: no suprapubic tenderness  Lymphatic: no LN palpable  Musculoskeletal: no muscle tenderness, no joint swelling or tenderness  Extremities: no pedal edema  Neurological/ Psychiatric: confused, moving all extremities  Skin: no rashes; no palpable lesions    Labs: reviewed                        12.0   22.92 )-----------( 155      ( 19 Sep 2019 06:18 )             34.8     -19    129<L>  |  101  |  19  ----------------------------<  64<L>  4.4   |  15<L>  |  0.69    Ca    8.0<L>      19 Sep 2019 06:18  Phos  3.3       Mg     1.7                             12.3   8.11  )-----------( 201      ( 14 Sep 2019 11:35 )             36.4     -14    133<L>  |  100  |  14  ----------------------------<  123<H>  4.2   |  25  |  0.84    Ca    9.3      14 Sep 2019 11:35    TPro  7.9  /  Alb  3.8  /  TBili  0.6  /  DBili  x   /  AST  20  /  ALT  24  /  AlkPhos  155<H>       LIVER FUNCTIONS - ( 14 Sep 2019 11:35 )  Alb: 3.8 g/dL / Pro: 7.9 gm/dL / ALK PHOS: 155 U/L / ALT: 24 U/L / AST: 20 U/L / GGT: x           Urinalysis Basic - ( 14 Sep 2019 11:48 )    Color: Yellow / Appearance: Slightly Turbid / S.015 / pH: x  Gluc: x / Ketone: Negative  / Bili: Negative / Urobili: Negative mg/dL   Blood: x / Protein: Negative mg/dL / Nitrite: Positive   Leuk Esterase: Moderate / RBC: 3-5 /HPF / WBC 26-50   Sq Epi: x / Non Sq Epi: x / Bacteria: Many      Culture - Blood (collected 14 Sep 2019 13:03)  Source: .Blood None  Preliminary Report (15 Sep 2019 19:01):    No growth to date.    Culture - Blood (collected 14 Sep 2019 13:03)  Source: .Blood None  Preliminary Report (15 Sep 2019 19:01):    No growth to date.    Radiology: all available radiological tests reviewed    < from: Xray Chest 1 View AP/PA. (19 @ 17:27) >  Linear atelectasis at the right lung base.    < end of copied text >    < from: Xray Chest 1 View-PORTABLE IMMEDIATE (19 @ 17:48) >  Tubes in place. No gross lung consolidations or effusions.    < end of copied text >      Advanced directives addressed: full resuscitation
Date of service: 19 @ 12:08    Remains intubated on ventilatory support  Has low grade fever  Lethargic    ROS: unobtainable    MEDICATIONS  (STANDING):  aspirin  chewable 81 milliGRAM(s) Oral daily  chlorhexidine 0.12% Liquid 15 milliLiter(s) Oral Mucosa every 12 hours  enoxaparin Injectable 40 milliGRAM(s) SubCutaneous daily  lacosamide 100 milliGRAM(s) Oral two times a day  pantoprazole   Suspension 40 milliGRAM(s) Oral daily  petrolatum Ophthalmic Ointment 1 Application(s) Both EYES every 3 hours  propofol Infusion 10 MICROgram(s)/kG/Min (4.356 mL/Hr) IV Continuous <Continuous>  senna 2 Tablet(s) Oral at bedtime  sertraline 150 milliGRAM(s) Oral daily  traZODone 50 milliGRAM(s) Oral at bedtime      Vital Signs Last 24 Hrs  T(C): 36.9 (27 Sep 2019 08:53), Max: 37.9 (27 Sep 2019 00:54)  T(F): 98.5 (27 Sep 2019 08:53), Max: 100.2 (27 Sep 2019 00:54)  HR: 70 (27 Sep 2019 08:00) (70 - 104)  BP: 100/67 (27 Sep 2019 07:00) (100/67 - 163/105)  BP(mean): 74 (27 Sep 2019 07:00) (53 - 121)  RR: --  SpO2: 100% (27 Sep 2019 08:00) (97% - 100%)    Physical Exam:      Constitutional: frail looking  HEENT: NC/AT, EOMI, PERRLA, conjunctivae clear  Neck: supple; thyroid not palpable  Back: no tenderness  Respiratory: respiratory effort normal; few b/l rhonchi  Cardiovascular: S1S2 regular, no murmurs  Abdomen: soft, not tender, not distended, positive BS  Genitourinary: no suprapubic tenderness  Lymphatic: no LN palpable  Musculoskeletal: no muscle tenderness, no joint swelling or tenderness  Extremities: no pedal edema  Neurological/ Psychiatric: confused, moving all extremities  Skin: no rashes; no palpable lesions    Labs: reviewed                        7.4    11.49 )-----------( 204      ( 27 Sep 2019 07:02 )             23.2         138  |  103  |  27<H>  ----------------------------<  119<H>  4.6   |  32<H>  |  0.59    Ca    8.6      27 Sep 2019 07:02  Phos  3.9       Mg     2.2                             8.2    11.56 )-----------( 227      ( 25 Sep 2019 06:21 )             24.8         139  |  102  |  17  ----------------------------<  117<H>  4.1   |  29  |  0.49<L>    Ca    8.6      25 Sep 2019 06:21  Phos  3.7       Mg     1.8                                       12.0   22.92 )-----------( 155      ( 19 Sep 2019 06:18 )             34.8     0919    129<L>  |  101  |  19  ----------------------------<  64<L>  4.4   |  15<L>  |  0.69    Ca    8.0<L>      19 Sep 2019 06:18  Phos  3.3       Mg     1.7         Urinalysis Basic - ( 14 Sep 2019 11:48 )    Color: Yellow / Appearance: Slightly Turbid / S.015 / pH: x  Gluc: x / Ketone: Negative  / Bili: Negative / Urobili: Negative mg/dL   Blood: x / Protein: Negative mg/dL / Nitrite: Positive   Leuk Esterase: Moderate / RBC: 3-5 /HPF / WBC 26-50   Sq Epi: x / Non Sq Epi: x / Bacteria: Many    Culture - Blood (collected 14 Sep 2019 13:03)  Source: .Blood None  Preliminary Report (15 Sep 2019 19:01):    No growth to date    Culture - Blood (collected 14 Sep 2019 13:03)  Source: .Blood None  Preliminary Report (15 Sep 2019 19:01):    No growth to date.      Radiology: all available radiological tests reviewed    < from: Xray Chest 1 View AP/PA. (19 @ 17:27) >  Linear atelectasis at the right lung base.    < end of copied text >    < from: Xray Chest 1 View-PORTABLE IMMEDIATE (19 @ 17:48) >  Tubes in place. No gross lung consolidations or effusions.    < end of copied text >    < from: MR Head No Cont (19 @ 12:00) >   Restricted diffusion with associated T2/FLAIR hyperintensity throughout   the bilateral basal ganglia and to a mild degree scattered within the   cortex of the bilateral cerebral hemispheres. These findings are   compatible with a global anoxic injury. No associated mass effector   hemorrhage.    < end of copied text >    < from: Xray Chest 1 View- PORTABLE-Urgent (19 @ 15:19) >  No active pulmonary disease.ET tube located 2 cm proximal to   neel. NG tube tip in stomach.    < end of copied text >      Advanced directives addressed: full resuscitation
Date of service: 19 @ 12:23    Lying in bed in NAD  Intubated on ventilatory support  Unresponsive and twiching  Febrile to 101.6F    ROS: unobtainable    MEDICATIONS  (STANDING):  aspirin  chewable 81 milliGRAM(s) Oral daily  chlorhexidine 0.12% Liquid 15 milliLiter(s) Oral Mucosa every 12 hours  enoxaparin Injectable 40 milliGRAM(s) SubCutaneous daily  lacosamide 100 milliGRAM(s) Oral two times a day  pantoprazole   Suspension 40 milliGRAM(s) Oral daily  petrolatum Ophthalmic Ointment 1 Application(s) Both EYES every 3 hours  potassium phosphate / sodium phosphate powder 2 Packet(s) Oral two times a day  propofol Infusion 10 MICROgram(s)/kG/Min (4.356 mL/Hr) IV Continuous <Continuous>  senna 2 Tablet(s) Oral at bedtime  sertraline 150 milliGRAM(s) Oral daily  traZODone 50 milliGRAM(s) Oral at bedtime      Vital Signs Last 24 Hrs  T(C): 37.2 (26 Sep 2019 11:10), Max: 38.7 (25 Sep 2019 20:10)  T(F): 98.9 (26 Sep 2019 11:10), Max: 101.6 (25 Sep 2019 20:10)  HR: 73 (26 Sep 2019 12:00) (71 - 116)  BP: 136/100 (26 Sep 2019 12:00) (94/61 - 159/97)  BP(mean): 110 (26 Sep 2019 12:00) (69 - 115)  RR: --  SpO2: 100% (26 Sep 2019 12:00) (95% - 100%)    Physical Exam:    Constitutional: frail looking  HEENT: NC/AT, EOMI, PERRLA, conjunctivae clear  Neck: supple; thyroid not palpable  Back: no tenderness  Respiratory: respiratory effort normal; few b/l rhonchi  Cardiovascular: S1S2 regular, no murmurs  Abdomen: soft, not tender, not distended, positive BS  Genitourinary: no suprapubic tenderness  Lymphatic: no LN palpable  Musculoskeletal: no muscle tenderness, no joint swelling or tenderness  Extremities: no pedal edema  Neurological/ Psychiatric: confused, moving all extremities  Skin: no rashes; no palpable lesions    Labs: reviewed                        7.8    11.44 )-----------( 202      ( 26 Sep 2019 06:31 )             23.6     09-    137  |  102  |  22  ----------------------------<  115<H>  4.3   |  31  |  0.50    Ca    8.9      26 Sep 2019 06:31  Phos  3.4     -  Mg     2.0                             8.2    11.56 )-----------( 227      ( 25 Sep 2019 06:21 )             24.8         139  |  102  |  17  ----------------------------<  117<H>  4.1   |  29  |  0.49<L>    Ca    8.6      25 Sep 2019 06:21  Phos  3.7       Mg     1.8                                       12.0   22.92 )-----------( 155      ( 19 Sep 2019 06:18 )             34.8         129<L>  |  101  |  19  ----------------------------<  64<L>  4.4   |  15<L>  |  0.69    Ca    8.0<L>      19 Sep 2019 06:18  Phos  3.3       Mg     1.7         Urinalysis Basic - ( 14 Sep 2019 11:48 )    Color: Yellow / Appearance: Slightly Turbid / S.015 / pH: x  Gluc: x / Ketone: Negative  / Bili: Negative / Urobili: Negative mg/dL   Blood: x / Protein: Negative mg/dL / Nitrite: Positive   Leuk Esterase: Moderate / RBC: 3-5 /HPF / WBC 26-50   Sq Epi: x / Non Sq Epi: x / Bacteria: Many    Culture - Blood (collected 14 Sep 2019 13:03)  Source: .Blood None  Preliminary Report (15 Sep 2019 19:01):    No growth to date    Culture - Blood (collected 14 Sep 2019 13:03)  Source: .Blood None  Preliminary Report (15 Sep 2019 19:01):    No growth to date.        Radiology: all available radiological tests reviewed    < from: Xray Chest 1 View AP/PA. (09.14.19 @ 17:27) >  Linear atelectasis at the right lung base.    < end of copied text >    < from: Xray Chest 1 View-PORTABLE IMMEDIATE (19 @ 17:48) >  Tubes in place. No gross lung consolidations or effusions.    < end of copied text >    < from: MR Head No Cont (19 @ 12:00) >   Restricted diffusion with associated T2/FLAIR hyperintensity throughout   the bilateral basal ganglia and to a mild degree scattered within the   cortex of the bilateral cerebral hemispheres. These findings are   compatible with a global anoxic injury. No associated mass effector   hemorrhage.    < end of copied text >      Advanced directives addressed: full resuscitation
Date of service: 19 @ 12:49    Lying in bed in NAD  Remains intubated on ventilatory support  Weak looking  Lethargic  Has low grade fever    ROS: unobtainable    MEDICATIONS  (STANDING):  aspirin  chewable 81 milliGRAM(s) Oral daily  chlorhexidine 0.12% Liquid 15 milliLiter(s) Oral Mucosa every 12 hours  enoxaparin Injectable 40 milliGRAM(s) SubCutaneous daily  lacosamide 100 milliGRAM(s) Oral two times a day  pantoprazole   Suspension 40 milliGRAM(s) Oral daily  petrolatum Ophthalmic Ointment 1 Application(s) Both EYES every 3 hours  potassium phosphate / sodium phosphate powder 2 Packet(s) Oral two times a day  propofol Infusion 10 MICROgram(s)/kG/Min (4.356 mL/Hr) IV Continuous <Continuous>  senna 2 Tablet(s) Oral at bedtime  sertraline 150 milliGRAM(s) Oral daily  traZODone 50 milliGRAM(s) Oral at bedtime      Vital Signs Last 24 Hrs  T(C): 37.3 (25 Sep 2019 11:37), Max: 37.7 (25 Sep 2019 00:00)  T(F): 99.1 (25 Sep 2019 11:37), Max: 99.9 (25 Sep 2019 04:00)  HR: 84 (25 Sep 2019 12:00) (82 - 112)  BP: 115/75 (25 Sep 2019 12:00) (115/75 - 177/81)  BP(mean): 83 (25 Sep 2019 12:00) (83 - 123)  RR: --  SpO2: 100% (25 Sep 2019 12:00) (95% - 100%)    Physical Exam:      Constitutional: frail looking  HEENT: NC/AT, EOMI, PERRLA, conjunctivae clear  Neck: supple; thyroid not palpable  Back: no tenderness  Respiratory: respiratory effort normal; few b/l rhonchi  Cardiovascular: S1S2 regular, no murmurs  Abdomen: soft, not tender, not distended, positive BS  Genitourinary: no suprapubic tenderness  Lymphatic: no LN palpable  Musculoskeletal: no muscle tenderness, no joint swelling or tenderness  Extremities: no pedal edema  Neurological/ Psychiatric: confused, moving all extremities  Skin: no rashes; no palpable lesions    Labs: reviewed                        8.2    11.56 )-----------( 227      ( 25 Sep 2019 06:21 )             24.8     09-    139  |  102  |  17  ----------------------------<  117<H>  4.1   |  29  |  0.49<L>    Ca    8.6      25 Sep 2019 06:21  Phos  3.7     -  Mg     1.8                                       12.0   22.92 )-----------( 155      ( 19 Sep 2019 06:18 )             34.8     09-    129<L>  |  101  |  19  ----------------------------<  64<L>  4.4   |  15<L>  |  0.69    Ca    8.0<L>      19 Sep 2019 06:18  Phos  3.3       Mg     1.7         Urinalysis Basic - ( 14 Sep 2019 11:48 )    Color: Yellow / Appearance: Slightly Turbid / S.015 / pH: x  Gluc: x / Ketone: Negative  / Bili: Negative / Urobili: Negative mg/dL   Blood: x / Protein: Negative mg/dL / Nitrite: Positive   Leuk Esterase: Moderate / RBC: 3-5 /HPF / WBC 26-50   Sq Epi: x / Non Sq Epi: x / Bacteria: Many    Culture - Blood (collected 14 Sep 2019 13:03)  Source: .Blood None  Preliminary Report (15 Sep 2019 19:01):    No growth to date    Culture - Blood (collected 14 Sep 2019 13:03)  Source: .Blood None  Preliminary Report (15 Sep 2019 19:01):    No growth to date.        Radiology: all available radiological tests reviewed    < from: Xray Chest 1 View AP/PA. (19 @ 17:27) >  Linear atelectasis at the right lung base.    < end of copied text >    < from: Xray Chest 1 View-PORTABLE IMMEDIATE (19 @ 17:48) >  Tubes in place. No gross lung consolidations or effusions.    < end of copied text >      Advanced directives addressed: full resuscitation
Date of service: 19 @ 15:11    Lying in bed in NAD  Weak looking  Remains intubated on ventilatory support  Lethargic  Off sedation    ROS: no fever or chills; unobtainable    MEDICATIONS  (STANDING):  aspirin  chewable 81 milliGRAM(s) Oral daily  chlorhexidine 0.12% Liquid 15 milliLiter(s) Oral Mucosa every 12 hours  enoxaparin Injectable 40 milliGRAM(s) SubCutaneous daily  pantoprazole   Suspension 40 milliGRAM(s) Oral daily  petrolatum Ophthalmic Ointment 1 Application(s) Both EYES every 3 hours  piperacillin/tazobactam IVPB.. 3.375 Gram(s) IV Intermittent every 8 hours  potassium chloride   Powder 40 milliEquivalent(s) Oral every 4 hours  potassium phosphate / sodium phosphate powder 2 Packet(s) Oral three times a day  senna 2 Tablet(s) Oral at bedtime  sertraline 150 milliGRAM(s) Oral daily  traZODone 50 milliGRAM(s) Oral at bedtime      Vital Signs Last 24 Hrs  T(C): 36.2 (23 Sep 2019 10:00), Max: 37.3 (23 Sep 2019 00:49)  T(F): 97.1 (23 Sep 2019 10:00), Max: 99.1 (23 Sep 2019 00:49)  HR: 71 (23 Sep 2019 14:27) (62 - 91)  BP: 116/53 (23 Sep 2019 11:00) (104/54 - 153/71)  BP(mean): 67 (23 Sep 2019 11:00) (60 - 90)  RR: --  SpO2: 100% (23 Sep 2019 14:27) (100% - 100%)    Physical Exam:      Constitutional: frail looking  HEENT: NC/AT, EOMI, PERRLA, conjunctivae clear  Neck: supple; thyroid not palpable  Back: no tenderness  Respiratory: respiratory effort normal; b/l rhonchi  Cardiovascular: S1S2 regular, no murmurs  Abdomen: soft, not tender, not distended, positive BS  Genitourinary: no suprapubic tenderness  Lymphatic: no LN palpable  Musculoskeletal: no muscle tenderness, no joint swelling or tenderness  Extremities: no pedal edema  Neurological/ Psychiatric: confused, moving all extremities  Skin: no rashes; no palpable lesions    Labs: reviewed                        7.4    7.15  )-----------( 149      ( 23 Sep 2019 06:45 )             21.7     09-    139  |  104  |  14  ----------------------------<  112<H>  3.1<L>   |  25  |  0.49<L>    Ca    8.2<L>      23 Sep 2019 06:45  Phos  1.6     -  Mg     1.9                                       12.0   22.92 )-----------( 155      ( 19 Sep 2019 06:18 )             34.8     09-19    129<L>  |  101  |  19  ----------------------------<  64<L>  4.4   |  15<L>  |  0.69    Ca    8.0<L>      19 Sep 2019 06:18  Phos  3.3     -  Mg     1.7     19    Urinalysis Basic - ( 14 Sep 2019 11:48 )    Color: Yellow / Appearance: Slightly Turbid / S.015 / pH: x  Gluc: x / Ketone: Negative  / Bili: Negative / Urobili: Negative mg/dL   Blood: x / Protein: Negative mg/dL / Nitrite: Positive   Leuk Esterase: Moderate / RBC: 3-5 /HPF / WBC 26-50   Sq Epi: x / Non Sq Epi: x / Bacteria: Many    Culture - Blood (collected 14 Sep 2019 13:03)  Source: .Blood None  Preliminary Report (15 Sep 2019 19:01):    No growth to date    Culture - Blood (collected 14 Sep 2019 13:03)  Source: .Blood None  Preliminary Report (15 Sep 2019 19:01):    No growth to date.        Radiology: all available radiological tests reviewed    < from: Xray Chest 1 View AP/PA. (19 @ 17:27) >  Linear atelectasis at the right lung base.    < end of copied text >    < from: Xray Chest 1 View-PORTABLE IMMEDIATE (19 @ 17:48) >  Tubes in place. No gross lung consolidations or effusions.    < end of copied text >      Advanced directives addressed: full resuscitation
Date of service: 19 @ 15:35    Lying in bed in NAD  Weak looking  Has urinary frequency  Has low grade fever    ROS: poorly verbal    MEDICATIONS  (STANDING):  cefuroxime   Tablet 500 milliGRAM(s) Oral every 12 hours  enoxaparin Injectable 40 milliGRAM(s) SubCutaneous daily  letrozole 2.5 milliGRAM(s) Oral daily  LORazepam     Tablet 1 milliGRAM(s) Oral two times a day  melatonin 6 milliGRAM(s) Oral at bedtime  QUEtiapine 100 milliGRAM(s) Oral <User Schedule>  QUEtiapine 150 milliGRAM(s) Oral at bedtime  senna 2 Tablet(s) Oral at bedtime  sertraline 150 milliGRAM(s) Oral daily  traZODone 50 milliGRAM(s) Oral at bedtime      Vital Signs Last 24 Hrs  T(C): 36.5 (16 Sep 2019 11:33), Max: 37.6 (15 Sep 2019 20:34)  T(F): 97.7 (16 Sep 2019 11:33), Max: 99.7 (15 Sep 2019 20:34)  HR: 93 (16 Sep 2019 11:33) (82 - 93)  BP: 140/59 (16 Sep 2019 11:33) (110/57 - 140/59)  BP(mean): --  RR: 18 (16 Sep 2019 05:52) (18 - 18)  SpO2: 97% (16 Sep 2019 05:52) (97% - 97%)    Physical Exam:      Constitutional: frail looking  HEENT: NC/AT, EOMI, PERRLA, conjunctivae clear  Neck: supple; thyroid not palpable  Back: no tenderness  Respiratory: respiratory effort normal; decreased BS at bases  Cardiovascular: S1S2 regular, no murmurs  Abdomen: soft, not tender, not distended, positive BS  Genitourinary: no suprapubic tenderness  Lymphatic: no LN palpable  Musculoskeletal: no muscle tenderness, no joint swelling or tenderness  Extremities: no pedal edema  Neurological/ Psychiatric: confused, moving all extremities  Skin: no rashes; no palpable lesions    Labs: reviewed                        9.8    7.09  )-----------( 148      ( 16 Sep 2019 06:35 )             28.7     09-16    136  |  104  |  20  ----------------------------<  99  4.2   |  25  |  0.79    Ca    8.4<L>      16 Sep 2019 06:35                        12.3   8.11  )-----------( 201      ( 14 Sep 2019 11:35 )             36.4         133<L>  |  100  |  14  ----------------------------<  123<H>  4.2   |  25  |  0.84    Ca    9.3      14 Sep 2019 11:35    TPro  7.9  /  Alb  3.8  /  TBili  0.6  /  DBili  x   /  AST  20  /  ALT  24  /  AlkPhos  155<H>       LIVER FUNCTIONS - ( 14 Sep 2019 11:35 )  Alb: 3.8 g/dL / Pro: 7.9 gm/dL / ALK PHOS: 155 U/L / ALT: 24 U/L / AST: 20 U/L / GGT: x           Urinalysis Basic - ( 14 Sep 2019 11:48 )    Color: Yellow / Appearance: Slightly Turbid / S.015 / pH: x  Gluc: x / Ketone: Negative  / Bili: Negative / Urobili: Negative mg/dL   Blood: x / Protein: Negative mg/dL / Nitrite: Positive   Leuk Esterase: Moderate / RBC: 3-5 /HPF / WBC 26-50   Sq Epi: x / Non Sq Epi: x / Bacteria: Many      Culture - Blood (collected 14 Sep 2019 13:03)  Source: .Blood None  Preliminary Report (15 Sep 2019 19:01):    No growth to date.    Culture - Blood (collected 14 Sep 2019 13:03)  Source: .Blood None  Preliminary Report (15 Sep 2019 19:01):    No growth to date.    Radiology: all available radiological tests reviewed    < from: Xray Chest 1 View AP/PA. (19 @ 17:27) >  Linear atelectasis at the right lung base.    < end of copied text >      Advanced directives addressed: full resuscitation
INTERVAL HPI/OVERNIGHT EVENTS:   The patient is an 78 yo female with Hx. of Schizoaffective disorder, who was sent to the ER form [facility] because of medication refusal , exacerbation of schizoaffective symptoms. EMS report, pt has been refusing medications, refusing to answer questions or speak, reporting that people are following her home and that men are sneaking into her room, removing her cloth in public. In the ER the patient is disoriented, communicating, not c/o pain.     9/15/19- Patient seen and examined at bedside. Patient with garbled speech and has been coughing. Denies any CP, SOB, abd pain.  9/16/19- Patient seen and examined at bedside. Patient refusing to participate in exam, not answering questions. Still with cough    MEDICATIONS  (STANDING):  cefuroxime   Tablet 500 milliGRAM(s) Oral every 12 hours  enoxaparin Injectable 40 milliGRAM(s) SubCutaneous daily  letrozole 2.5 milliGRAM(s) Oral daily  LORazepam     Tablet 1 milliGRAM(s) Oral two times a day  melatonin 6 milliGRAM(s) Oral at bedtime  QUEtiapine 100 milliGRAM(s) Oral <User Schedule>  QUEtiapine 150 milliGRAM(s) Oral at bedtime  senna 2 Tablet(s) Oral at bedtime  sertraline 150 milliGRAM(s) Oral daily  traZODone 50 milliGRAM(s) Oral at bedtime    MEDICATIONS  (PRN):  acetaminophen   Tablet .. 650 milliGRAM(s) Oral every 6 hours PRN Temp greater or equal to 38C (100.4F), Mild Pain (1 - 3)  guaiFENesin    Syrup 200 milliGRAM(s) Oral every 6 hours PRN Cough      Allergies    Keppra (Rash)  PT ALLERGIC TO THE GENERIC OF ZYPREXA (Rash)    Intolerances      Unable to obtain ROS 2/2 patient refusal    Vital Signs Last 24 Hrs  T(C): 36.5 (16 Sep 2019 11:33), Max: 37.6 (15 Sep 2019 20:34)  T(F): 97.7 (16 Sep 2019 11:33), Max: 99.7 (15 Sep 2019 20:34)  HR: 93 (16 Sep 2019 11:33) (82 - 93)  BP: 140/59 (16 Sep 2019 11:33) (110/57 - 140/59)  BP(mean): --  RR: 18 (16 Sep 2019 05:52) (18 - 18)  SpO2: 97% (16 Sep 2019 05:52) (97% - 97%)    09-15 @ 07:01  -  09-16 @ 07:00  --------------------------------------------------------  IN: 240 mL / OUT: 250 mL / NET: -10 mL      Physical Exam:  General: NAD  Neurology: nonfocal, SUBRAMANIAN x 4  Respiratory: CTA B/L  CV: RRR, S1S2  Abdominal: Soft, NT, ND +BS  Extremities: No edema, + peripheral pulses      LABS:                        9.8    7.09  )-----------( 148      ( 16 Sep 2019 06:35 )             28.7     09-16    136  |  104  |  20  ----------------------------<  99  4.2   |  25  |  0.79    Ca    8.4<L>      16 Sep 2019 06:35            RADIOLOGY & ADDITIONAL TESTS:
INTERVAL HPI/OVERNIGHT EVENTS:  The patient is an 78 yo female with Hx. of Schizoaffective disorder, who was sent to the ER form [facility] because of medication refusal , exacerbation of schizoaffective symptoms. EMS report, pt has been refusing medications, refusing to answer questions or speak, reporting that people are following her home and that men are sneaking into her room, removing her cloth in public. In the ER the patient is disoriented, communicating, not c/o pain.     9/15/19- Patient seen and examined at bedside. Patient with garbled speech and has been coughing. Denies any CP, SOB, abd pain.  9/16/19- Patient seen and examined at bedside. Patient refusing to participate in exam, not answering questions. Still with cough  9/17/19- Patient seen and examined at bedside. More cooperative today. States she feels better, denies any dysuria, states cough improving.    MEDICATIONS  (STANDING):  cefuroxime   Tablet 500 milliGRAM(s) Oral every 12 hours  enoxaparin Injectable 40 milliGRAM(s) SubCutaneous daily  letrozole 2.5 milliGRAM(s) Oral daily  LORazepam     Tablet 1 milliGRAM(s) Oral two times a day  melatonin 6 milliGRAM(s) Oral at bedtime  QUEtiapine 100 milliGRAM(s) Oral <User Schedule>  QUEtiapine 200 milliGRAM(s) Oral at bedtime  senna 2 Tablet(s) Oral at bedtime  sertraline 150 milliGRAM(s) Oral daily  traZODone 50 milliGRAM(s) Oral at bedtime    MEDICATIONS  (PRN):  acetaminophen   Tablet .. 650 milliGRAM(s) Oral every 6 hours PRN Temp greater or equal to 38C (100.4F), Mild Pain (1 - 3)  guaiFENesin    Syrup 200 milliGRAM(s) Oral every 6 hours PRN Cough      Allergies    Keppra (Rash)  PT ALLERGIC TO THE GENERIC OF ZYPREXA (Rash)    Intolerances      ROS:  CONSTITUTIONAL: + weakness, no fevers or chills  EYES/ENT: No visual changes;  No vertigo or throat pain   NECK: No pain or stiffness  RESPIRATORY: +cough, no wheezing, hemoptysis; No shortness of breath  CARDIOVASCULAR: No chest pain or palpitations  GASTROINTESTINAL: No abdominal or epigastric pain. No nausea, vomiting, or hematemesis  GENITOURINARY: No dysuria, frequency or hematuria  NEUROLOGICAL: No numbness or weakness  SKIN: No itching, burning, rashes, or lesions   All other review of systems is negative unless indicated above.    Vital Signs Last 24 Hrs  T(C): 36.8 (17 Sep 2019 11:38), Max: 37.6 (16 Sep 2019 21:35)  T(F): 98.2 (17 Sep 2019 11:38), Max: 99.7 (16 Sep 2019 21:35)  HR: 86 (17 Sep 2019 11:38) (86 - 90)  BP: 114/50 (17 Sep 2019 11:38) (114/50 - 124/52)  BP(mean): --  RR: 18 (17 Sep 2019 11:38) (18 - 19)  SpO2: 98% (17 Sep 2019 11:38) (95% - 98%)      Physical Exam:  General: NAD  Neurology: nonfocal, SUBRAMANIAN x 4  Respiratory: CTA B/L  CV: RRR, S1S2  Abdominal: Soft, NT, ND +BS  Extremities: No edema, + peripheral pulses      LABS:                        9.9    5.02  )-----------( 123      ( 17 Sep 2019 07:12 )             28.8     09-17    128<L>  |  95<L>  |  14  ----------------------------<  94  3.6   |  24  |  0.70    Ca    8.1<L>      17 Sep 2019 07:12            RADIOLOGY & ADDITIONAL TESTS:
INTERVAL HPI/OVERNIGHT EVENTS:  The patient is an 80 yo female with Hx. of Schizoaffective disorder, who was sent to the ER form [facility] because of medication refusal , exacerbation of schizoaffective symptoms. EMS report, pt has been refusing medications, refusing to answer questions or speak, reporting that people are following her home and that men are sneaking into her room, removing her cloth in public. In the ER the patient is disoriented, communicating, not c/o pain.     9/15/19- Patient seen and examined at bedside. Patient with garbled speech and has been coughing. Denies any CP, SOB, abd pain.    MEDICATIONS  (STANDING):  cephalexin 500 milliGRAM(s) Oral every 12 hours  letrozole 2.5 milliGRAM(s) Oral daily  LORazepam     Tablet 1 milliGRAM(s) Oral two times a day  melatonin 3 milliGRAM(s) Oral at bedtime  QUEtiapine 100 milliGRAM(s) Oral at bedtime  senna 2 Tablet(s) Oral at bedtime  sertraline 100 milliGRAM(s) Oral daily  traZODone 50 milliGRAM(s) Oral at bedtime    MEDICATIONS  (PRN):  acetaminophen   Tablet .. 650 milliGRAM(s) Oral every 6 hours PRN Temp greater or equal to 38C (100.4F), Mild Pain (1 - 3)  guaiFENesin    Syrup 200 milliGRAM(s) Oral every 6 hours PRN Cough      Allergies    Keppra (Rash)  PT ALLERGIC TO THE GENERIC OF ZYPREXA (Rash)    Intolerances      Limited ROS 2/2 medical condition, denies any CP, SOB      Vital Signs Last 24 Hrs  T(C): 36.8 (15 Sep 2019 11:39), Max: 38 (14 Sep 2019 16:55)  T(F): 98.2 (15 Sep 2019 11:39), Max: 100.4 (14 Sep 2019 16:55)  HR: 83 (15 Sep 2019 11:39) (83 - 113)  BP: 122/87 (15 Sep 2019 11:39) (113/58 - 146/67)  BP(mean): --  RR: 18 (15 Sep 2019 11:39) (16 - 19)  SpO2: 95% (15 Sep 2019 11:39) (93% - 96%)     @ 07:01  -  09-15 @ 07:00  --------------------------------------------------------  IN: 0 mL / OUT: 600 mL / NET: -600 mL      Physical Exam:  General: WN/WD NAD  Neurology: nonfocal, SUBRAMANIAN x 4, garbled speech  Respiratory: CTA B/L  CV: RRR, S1S2, no murmurs, rubs or gallops  Abdominal: Soft, NT, ND +BS  Extremities: No edema, + peripheral pulses      LABS:                        12.3   8.11  )-----------( 201      ( 14 Sep 2019 11:35 )             36.4         133<L>  |  100  |  14  ----------------------------<  123<H>  4.2   |  25  |  0.84    Ca    9.3      14 Sep 2019 11:35    TPro  7.9  /  Alb  3.8  /  TBili  0.6  /  DBili  x   /  AST  20  /  ALT  24  /  AlkPhos  155<H>      PT/INR - ( 14 Sep 2019 11:35 )   PT: 13.6 sec;   INR: 1.22 ratio         PTT - ( 14 Sep 2019 11:35 )  PTT:65.0 sec  Urinalysis Basic - ( 14 Sep 2019 11:48 )    Color: Yellow / Appearance: Slightly Turbid / S.015 / pH: x  Gluc: x / Ketone: Negative  / Bili: Negative / Urobili: Negative mg/dL   Blood: x / Protein: Negative mg/dL / Nitrite: Positive   Leuk Esterase: Moderate / RBC: 3-5 /HPF / WBC 26-50   Sq Epi: x / Non Sq Epi: x / Bacteria: Many        RADIOLOGY & ADDITIONAL TESTS:
ON events noted, food bolus aspiration and code blue. Intubated on pressors.    REVIEW OF SYSTEMS:    UTO    MEDICATIONS  (STANDING):  acetaminophen   Tablet .. 650 milliGRAM(s) Oral every 6 hours  aspirin  chewable 81 milliGRAM(s) Oral daily  busPIRone 30 milliGRAM(s) Oral every 8 hours  chlorhexidine 0.12% Liquid 15 milliLiter(s) Oral Mucosa every 12 hours  enoxaparin Injectable 40 milliGRAM(s) SubCutaneous daily  pantoprazole  Injectable 40 milliGRAM(s) IV Push daily  petrolatum Ophthalmic Ointment 1 Application(s) Both EYES every 3 hours  phenylephrine    Infusion 0.1 MICROgram(s)/kG/Min (2.722 mL/Hr) IV Continuous <Continuous>  piperacillin/tazobactam IVPB.. 3.375 Gram(s) IV Intermittent every 8 hours  propofol Infusion 10 MICROgram(s)/kG/Min (4.356 mL/Hr) IV Continuous <Continuous>  senna 2 Tablet(s) Oral at bedtime  sertraline 150 milliGRAM(s) Oral daily  sodium chloride 0.9%. 1000 milliLiter(s) (75 mL/Hr) IV Continuous <Continuous>  traZODone 50 milliGRAM(s) Oral at bedtime    MEDICATIONS  (PRN):  acetaminophen   Tablet .. 650 milliGRAM(s) Oral every 6 hours PRN Temp greater or equal to 38C (100.4F), Mild Pain (1 - 3)  fentaNYL    Injectable 50 MICROGram(s) IV Push every 2 hours PRN shivering        T(C): , Max: 37.8 (09-17-19 @ 19:10)  T(F): , Max: 100 (09-17-19 @ 19:10)  HR: 67 (09-18-19 @ 11:00)  BP: 132/81 (09-18-19 @ 11:00)  BP(mean): 92 (09-18-19 @ 11:00)  RR: 14 (09-18-19 @ 11:00)  SpO2: 100% (09-18-19 @ 11:00)  Wt(kg): --    09-17 @ 07:01  -  09-18 @ 07:00  --------------------------------------------------------  IN: 6975 mL / OUT: 2165 mL / NET: 4810 mL    09-18 @ 07:01  -  09-18 @ 11:25  --------------------------------------------------------  IN: 0 mL / OUT: 100 mL / NET: -100 mL          PHYSICAL EXAM:    Constitutional: NAD  HEENT: int, ogt  Respiratory: scatt ronchi  Cardiovascular: S1 and S2, RRR  Gastrointestinal: BS+, soft, NT/ND  Extremities: No peripheral edema  Neurological: not arousable, twitch  Skin: No rashes  Access: Not applicable        LABS:                        11.4   14.05 )-----------( 205      ( 18 Sep 2019 06:49 )             34.3     18 Sep 2019 06:49    127    |  101    |  15     ----------------------------<  93     4.3     |  19     |  0.66   18 Sep 2019 02:01    130    |  100    |  16     ----------------------------<  142    3.8     |  19     |  0.75   17 Sep 2019 21:19    129    |  98     |  16     ----------------------------<  167    3.7     |  18     |  0.71   17 Sep 2019 07:12    128    |  95     |  14     ----------------------------<  94     3.6     |  24     |  0.70   16 Sep 2019 06:35    136    |  104    |  20     ----------------------------<  99     4.2     |  25     |  0.79     Ca    7.5        18 Sep 2019 06:49  Ca    6.9        18 Sep 2019 02:01  Ca    6.8        17 Sep 2019 21:19  Ca    8.1        17 Sep 2019 07:12  Ca    8.4        16 Sep 2019 06:35  Phos  3.4       18 Sep 2019 06:49  Mg     1.7       18 Sep 2019 06:49  Mg     1.6       18 Sep 2019 02:01  Mg     1.6       17 Sep 2019 21:19    TPro  7.9    /  Alb  3.8    /  TBili  0.6    /  DBili  x      /  AST  20     /  ALT  24     /  AlkPhos  155    14 Sep 2019 11:35          Urine Studies:    Chloride, Random Urine: 45 mmol/L (09-17 @ 15:44)  Sodium, Random Urine: 43 mmol/L (09-17 @ 15:44)  Osmolality, Random Urine: 342 mosm/Kg (09-17 @ 15:44)        RADIOLOGY & ADDITIONAL STUDIES:
Patient is a 79y Female who remains intubated  REVIEW OF SYSTEMS:  UTO    MEDICATIONS  (STANDING):  aspirin  chewable 81 milliGRAM(s) Oral daily  busPIRone 30 milliGRAM(s) Oral every 8 hours  chlorhexidine 0.12% Liquid 15 milliLiter(s) Oral Mucosa every 12 hours  enoxaparin Injectable 40 milliGRAM(s) SubCutaneous daily  pantoprazole  Injectable 40 milliGRAM(s) IV Push daily  petrolatum Ophthalmic Ointment 1 Application(s) Both EYES every 3 hours  phenylephrine    Infusion 0.1 MICROgram(s)/kG/Min (2.722 mL/Hr) IV Continuous <Continuous>  piperacillin/tazobactam IVPB.. 3.375 Gram(s) IV Intermittent every 8 hours  propofol Infusion 10 MICROgram(s)/kG/Min (4.356 mL/Hr) IV Continuous <Continuous>  senna 2 Tablet(s) Oral at bedtime  sertraline 150 milliGRAM(s) Oral daily  traZODone 50 milliGRAM(s) Oral at bedtime    MEDICATIONS  (PRN):  acetaminophen   Tablet .. 650 milliGRAM(s) Oral every 6 hours PRN Temp greater or equal to 38C (100.4F), Mild Pain (1 - 3)  LORazepam   Injectable 1 milliGRAM(s) IV Push every 2 hours PRN myoclonic jerks        T(C): , Max: 37.6 (09-20-19 @ 03:00)  T(F): , Max: 99.7 (09-20-19 @ 03:00)  HR: 85 (09-20-19 @ 15:00)  BP: 123/53 (09-20-19 @ 15:00)  BP(mean): 70 (09-20-19 @ 15:00)  RR: 19 (09-20-19 @ 15:00)  SpO2: 100% (09-20-19 @ 15:00)  Wt(kg): --    09-19 @ 07:01  -  09-20 @ 07:00  --------------------------------------------------------  IN: 2411 mL / OUT: 729 mL / NET: 1682 mL    09-20 @ 07:01  -  09-20 @ 17:06  --------------------------------------------------------  IN: 566.4 mL / OUT: 0 mL / NET: 566.4 mL          PHYSICAL EXAM:    Constitutional: NAD, frail  Neck: No LAD, No JVD  Respiratory: arianat syed  Cardiovascular: S1 and S2, RRR  Gastrointestinal: soft  Extremities: No peripheral edema  Neurological: int  Skin: No rashes  Access: Not applicable        LABS:                        9.1    14.22 )-----------( 173      ( 20 Sep 2019 06:30 )             26.9     20 Sep 2019 06:30    134    |  105    |  21     ----------------------------<  72     3.7     |  17     |  1.02   19 Sep 2019 06:18    129    |  101    |  19     ----------------------------<  64     4.4     |  15     |  0.69   19 Sep 2019 01:22    129    |  100    |  17     ----------------------------<  75     4.2     |  13     |  0.60   18 Sep 2019 20:33    129    |  100    |  17     ----------------------------<  68     4.4     |  13     |  0.62   18 Sep 2019 13:54    129    |  102    |  16     ----------------------------<  83     4.3     |  16     |  0.58     Ca    8.2        20 Sep 2019 06:30  Ca    8.0        19 Sep 2019 06:18  Ca    7.6        19 Sep 2019 01:22  Ca    7.6        18 Sep 2019 20:33  Ca    7.4        18 Sep 2019 13:54  Phos  3.1       20 Sep 2019 06:30  Phos  3.3       19 Sep 2019 06:18  Phos  2.9       19 Sep 2019 01:22  Phos  3.4       18 Sep 2019 06:49  Mg     1.8       20 Sep 2019 06:30  Mg     1.7       19 Sep 2019 06:18  Mg     1.7       19 Sep 2019 01:22  Mg     1.7       18 Sep 2019 06:49  Mg     1.6       18 Sep 2019 02:01            Urine Studies:          RADIOLOGY & ADDITIONAL STUDIES:
remains intubated and not responsive   dtr domenica at bedside  twitching noted  in midst of warming protocol  remains NPO   on IVF and low dose perssors     REVIEW OF SYSTEMS:  UTO    MEDICATIONS  (STANDING):  aspirin  chewable 81 milliGRAM(s) Oral daily  busPIRone 30 milliGRAM(s) Oral every 8 hours  chlorhexidine 0.12% Liquid 15 milliLiter(s) Oral Mucosa every 12 hours  enoxaparin Injectable 40 milliGRAM(s) SubCutaneous daily  pantoprazole  Injectable 40 milliGRAM(s) IV Push daily  petrolatum Ophthalmic Ointment 1 Application(s) Both EYES every 3 hours  phenylephrine    Infusion 0.1 MICROgram(s)/kG/Min (2.722 mL/Hr) IV Continuous <Continuous>  piperacillin/tazobactam IVPB.. 3.375 Gram(s) IV Intermittent every 8 hours  propofol Infusion 10 MICROgram(s)/kG/Min (4.356 mL/Hr) IV Continuous <Continuous>  senna 2 Tablet(s) Oral at bedtime  sertraline 150 milliGRAM(s) Oral daily  sodium chloride 0.9%. 1000 milliLiter(s) (75 mL/Hr) IV Continuous <Continuous>  traZODone 50 milliGRAM(s) Oral at bedtime    MEDICATIONS  (PRN):  acetaminophen   Tablet .. 650 milliGRAM(s) Oral every 6 hours PRN Temp greater or equal to 38C (100.4F), Mild Pain (1 - 3)  fentaNYL    Injectable 50 MICROGram(s) IV Push every 2 hours PRN shivering  LORazepam   Injectable 1 milliGRAM(s) IV Push every 2 hours PRN myoclonic jerks    Vital Signs Last 24 Hrs  T(C): 36.2 (19 Sep 2019 12:30), Max: 36.2 (19 Sep 2019 12:30)  T(F): 97.2 (19 Sep 2019 12:30), Max: 97.2 (19 Sep 2019 12:30)  HR: 93 (19 Sep 2019 12:30) (63 - 100)  BP: 121/54 (19 Sep 2019 12:30) (90/- - 163/78)  BP(mean): 72 (19 Sep 2019 12:30) (56 - 157)  RR: 18 (19 Sep 2019 12:30) (10 - 21)  SpO2: 100% (19 Sep 2019 12:30) (91% - 100%)      I&O's Detail    18 Sep 2019 07:01  -  19 Sep 2019 07:00  --------------------------------------------------------  IN:    IV PiggyBack: 400 mL    phenylephrine   Infusion: 343 mL    propofol Infusion: 305 mL    sodium chloride 0.9%.: 1673 mL  Total IN: 2721 mL    OUT:    Indwelling Catheter - Urethral: 540 mL  Total OUT: 540 mL    Total NET: 2181 mL      19 Sep 2019 07:01  -  19 Sep 2019 12:55  --------------------------------------------------------  IN:    phenylephrine   Infusion: 27 mL    propofol Infusion: 76 mL    sodium chloride 0.9%.: 450 mL  Total IN: 553 mL    OUT:    Indwelling Catheter - Urethral: 93 mL  Total OUT: 93 mL    Total NET: 460 mL        PHYSICAL EXAM:    Constitutional: intubated   HEENT: obtunded   Respiratory: arianat syed  Cardiovascular: S1 and S2, RRR  Gastrointestinal: BS+, soft, NT/ND  Extremities: No peripheral edema  Neurological: not arousable, twitching  Skin: No rashes  Access: Not applicable        LABS:    129    |  101    |  19     ----------------------------<  64        19 Sep 2019 06:18  4.4     |  15     |  0.69     129    |  100    |  17     ----------------------------<  75        19 Sep 2019 01:22  4.2     |  13     |  0.60     129    |  100    |  17     ----------------------------<  68        18 Sep 2019 20:33  4.4     |  13     |  0.62     Ca    8.0        19 Sep 2019 06:18  Ca    7.6        19 Sep 2019 01:22    Phos  3.3       19 Sep 2019 06:18  Phos  2.9       19 Sep 2019 01:22    Mg     1.7       19 Sep 2019 06:18  Mg     1.7       19 Sep 2019 01:22    Chloride, Random Urine: 45 mmol/L (09-17 @ 15:44)  Sodium, Random Urine: 43 mmol/L (09-17 @ 15:44)  Osmolality, Random Urine: 342 mosm/Kg (09-17 @ 15:44)      RADIOLOGY & ADDITIONAL STUDIES:
responded to Code Blue Patient underwent approximately 10 minutes cpr    HPI:    This patient is an 79 year old female with PMH      significant for Schizoaffective disorder                           breast ca     the patient lived in [facility] living, she was admitted with inc lethargy felt to be secondary to UTI and medication refusal     Had pyuria     Today patient was awake last seen 20 minutes prior to event.     Patient was found pulseless, Patient had massive amounts of food in throat.     Patient initially had PEA, received 3 epinephrines, never shocked,     regained ROSC after intubation          PMH: as above    all Keppra    social from assisted living           MEDICATIONS  (STANDING):  cefuroxime   Tablet 500 milliGRAM(s) Oral every 12 hours  enoxaparin Injectable 40 milliGRAM(s) SubCutaneous daily  letrozole 2.5 milliGRAM(s) Oral daily  LORazepam     Tablet 1 milliGRAM(s) Oral two times a day  melatonin 6 milliGRAM(s) Oral at bedtime  QUEtiapine 100 milliGRAM(s) Oral <User Schedule>  QUEtiapine 200 milliGRAM(s) Oral at bedtime  senna 2 Tablet(s) Oral at bedtime  sertraline 150 milliGRAM(s) Oral daily  traZODone 50 milliGRAM(s) Oral at bedtime    MEDICATIONS  (PRN):  acetaminophen   Tablet .. 650 milliGRAM(s) Oral every 6 hours PRN Temp greater or equal to 38C (100.4F), Mild Pain (1 - 3)  guaiFENesin    Syrup 200 milliGRAM(s) Oral every 6 hours PRN Cough    general comfotose  HEENT nc/at orally intubated og tube, throat was filled with egg which was cleared  neck no jvd  lungs clear  abdomen soft  ext warm      ICU Vital Signs Last 24 Hrs  T(C): 36.8 (17 Sep 2019 11:38), Max: 37.6 (16 Sep 2019 21:35)  T(F): 98.2 (17 Sep 2019 11:38), Max: 99.7 (16 Sep 2019 21:35)  HR: 86 (17 Sep 2019 11:38) (86 - 90)  BP: 114/50 (17 Sep 2019 11:38) (114/50 - 124/52)  BP(mean): --  ABP: --  ABP(mean): --  RR: 18 (17 Sep 2019 11:38) (18 - 19)  SpO2: 98% (17 Sep 2019 11:38) (95% - 98%)                            9.9    5.02  )-----------( 123      ( 17 Sep 2019 07:12 )             28.8       09-17    128<L>  |  95<L>  |  14  ----------------------------<  94  3.6   |  24  |  0.70    Ca    8.1<L>      17 Sep 2019 07:12      DVT Prophylaxis:  Lovenox                                                               Advanced Directives: Full Code
I have updated patients daughter at bedside regarding patients current status, hospital course, plan of care, and prognosis.  I told her we will order CT head, EEG, and consult a Neurologist  All questions answered in detail.
· Reason for Referral/Consultation:	S/p Cardiac arrest Neurological evaluation	  9/24/19 : Pt on EEG monitoring for muscle twitching and last night had seizure activity rx with Vimpat. Now clinically no recurrent episodes noted. opens eyes to calling name but not follows commands.     MEDICATIONS  (STANDING):  aspirin  chewable 81 milliGRAM(s) Oral daily  chlorhexidine 0.12% Liquid 15 milliLiter(s) Oral Mucosa every 12 hours  enoxaparin Injectable 40 milliGRAM(s) SubCutaneous daily  lacosamide 100 milliGRAM(s) Oral two times a day  pantoprazole   Suspension 40 milliGRAM(s) Oral daily  petrolatum Ophthalmic Ointment 1 Application(s) Both EYES every 3 hours  piperacillin/tazobactam IVPB.. 3.375 Gram(s) IV Intermittent every 8 hours  potassium phosphate / sodium phosphate powder 2 Packet(s) Oral three times a day  propofol Infusion 10 MICROgram(s)/kG/Min (4.356 mL/Hr) IV Continuous <Continuous>  senna 2 Tablet(s) Oral at bedtime  sertraline 150 milliGRAM(s) Oral daily  traZODone 50 milliGRAM(s) Oral at bedtime      Vital Signs Last 24 Hrs  T(C): 37 (24 Sep 2019 08:48), Max: 38 (24 Sep 2019 00:52)  T(F): 98.6 (24 Sep 2019 08:48), Max: 100.4 (24 Sep 2019 00:52)  HR: 76 (24 Sep 2019 10:00) (64 - 98)  BP: 109/71 (24 Sep 2019 10:00) (91/41 - 160/96)  BP(mean): 75 (24 Sep 2019 10:00) (51 - 113)  RR: --  SpO2: 100% (24 Sep 2019 10:00) (100% - 100%)    Neurological exam: Limited Exam Pt on Ventilator, not on sedation.  HF:   CN: Pupils, pin point 1.5 mm no reactive, unable to visualize fundi ,opens eyes to calling name,  + ve corneal response  bilaterally. Gag not tested  Motor: No spontaneous movements noted bur decerebrate posturing noted left> right..    Sens: no response  Reflexes: Reflexes 0 bilaterally Plantars triple flexion response  Coord:  Not able to assess   Gait/Balance: Cannot test                        7.2    6.86  )-----------( 173      ( 24 Sep 2019 06:26 )             22.3     09-24    140  |  106  |  22  ----------------------------<  113<H>  4.7   |  28  |  0.61    Ca    8.4<L>      24 Sep 2019 06:26  Phos  2.4     09-24  Mg     1.9     09-24    Radiology report:  - CT Head  < from: CT Head No Cont (09.22.19 @ 10:44) >  IMPRESSION:    No acute intracranial hemorrhage.  - EEG  < from: EEG w/ Video Each 24 Hours (09.24.19 @ 10:00) >  EEG Summary/Classification:  This is an abnormal prolonged video EEG due to the presence of:  -Marked generalized slowing initially, followed by increased slowing with   voltage suppression.  -Sharp waves which are most frequent/prominent over the left temporal   region.  -Multiple reports of seizure like activity, at least two of which   correlate with runs of rhythmic activity in the right fronto-temporal   region with superimposed sharp waves.  EEG Impression/Clinical Correlate:  These findings suggest marked encephalopathy. Increased voltage   suppression may be related to sedation.  There is also evidence of a risk of seizures.   Although these  findings  may be seen in the setting ofanoxia, there is   also evidence of focal onset seizures which may suggest a more focal   insult.
79y old F with a PMHx of Schizoaffective disorder, who was sent to the ER form [facility] because of medication refusal, exacerbation of schizoaffective symptoms.  Pt sustained cardiac arrest on .  Food noted in back of mouth during intubation.  Down time at least 10 mins in the hospital.      :  CCU D # 2.  Vented.  sedated.  cooled.  On phenyl gtt.  myoclonic jerks noted.  Poor prognosis for meaningful neuro recovery    :  CCU D # 3.  Vented.  Sedated.  Warming phase.  myoclonic jerks noted.  TTE ()--EF 60%/decreased compliance/RV OK/2+ tr    :  CCU D # 4.  Completed protocol.  Not responsive.  Myoclonic jerks noted.  Remains on low dose phenyl gtt     - Patient seen and examined. Chart reviewed. Events noted. Remains on the vent. Off sedation.      - Overnight events noted. Remains on the vent. Has not received sedation x 2-days.     : Patient remains vented unresponsive but posturing to pain.      : Vented, and with seizure activity over night.      : No events over night, periods of twitching.       : The patients condition remains unchanged.  Unresponsive and twitching.      : No events over night.  Still with twitching.  Plan for a family meeting at 9 Am on : had a family meeting will terminally wean    : Now on a morphine drip        PAST MEDICAL & SURGICAL HISTORY:  Schizophrenia  Breast cancer  No significant past surgical history      FAMILY HISTORY:  No pertinent family history in first degree relatives: parents , does not know parents medical history      Social Hx:    Allergies    Keppra (Rash)  PT ALLERGIC TO THE GENERIC OF ZYPREXA (Rash)    Intolerances            ICU Vital Signs Last 24 Hrs  T(C): 38.4 (29 Sep 2019 05:43), Max: 38.4 (29 Sep 2019 00:06)  T(F): 101.2 (29 Sep 2019 05:43), Max: 101.2 (29 Sep 2019 05:43)  HR: 101 (29 Sep 2019 07:00) (82 - 123)  BP: 76/48 (29 Sep 2019 06:00) (75/46 - 163/90)  BP(mean): 54 (29 Sep 2019 06:00) (53 - 114)  ABP: --  ABP(mean): --  RR: 20 (28 Sep 2019 18:00) (18 - 20)  SpO2: 99% (28 Sep 2019 11:00) (99% - 100%)      Mode: AC/ CMV (Assist Control/ Continuous Mandatory Ventilation)  RR (machine): 12  TV (machine): 450  FiO2: 40  PEEP: 5  ITime: 1  PIP: 19      I&O's Summary    28 Sep 2019 07:01  -  29 Sep 2019 07:00  --------------------------------------------------------  IN: 100 mL / OUT: 0 mL / NET: 100 MEDICATIONS  (STANDING):  glycopyrrolate Injectable 0.2 milliGRAM(s) IV Push every 4 hours  morphine  Infusion 4 mG/Hr (4 mL/Hr) IV Continuous <Continuous>  scopolamine 1.5 mG Transdermal Patch - Peds 1 Patch Transdermal every 72 hours    MEDICATIONS  (PRN):  LORazepam   Injectable 2 milliGRAM(s) IntraMuscular every 1 hour PRN twitching  morphine  - Injectable 2 milliGRAM(s) IV Push every 30 minutes PRN for breakthrough pain/respiratory distress      DVT Prophylaxis:    Advanced Directives:  Discussed with:    Visit Information:    ** Time is exclusive of billed procedures and/or teaching and/or routine family updates.
CC:  Cardiac arrest    HPI:    80 yo female with Hx. of Schizoaffective disorder, who was sent to the ER form [facility] because of medication refusal , exacerbation of schizoaffective symptoms.  Pt sustained cardiac arrest on 9/17.  Food noted in back of mouth during intubation.  Down time at least 10 mins.      9/18:  CCU D # 2.  Vented.  sedated.  cooled.  On phenyl gtt.  myoclonic jerks noted.  Poor prognosis for meaningful neuro recovery    9/19:  CCU D # 3.  Vented.  Sedated.  Warming phase.  myoclonic jerks noted.  TTE (9/18)--EF 60%/decreased compliance/RV OK/2+ tr    9/20:  CCU D # 4.  Completed protocol.  Not responsive.  Myoclonic jerks noted.  Remains on low dose phenyl gtt      PMH:  As above.     PSH:  As above.     FH: Non Contributory other than those listed in HPI    Social History:      MEDICATIONS  (STANDING):  aspirin  chewable 81 milliGRAM(s) Oral daily  busPIRone 30 milliGRAM(s) Oral every 8 hours  chlorhexidine 0.12% Liquid 15 milliLiter(s) Oral Mucosa every 12 hours  enoxaparin Injectable 40 milliGRAM(s) SubCutaneous daily  pantoprazole  Injectable 40 milliGRAM(s) IV Push daily  petrolatum Ophthalmic Ointment 1 Application(s) Both EYES every 3 hours  phenylephrine    Infusion 0.1 MICROgram(s)/kG/Min (2.722 mL/Hr) IV Continuous <Continuous>  piperacillin/tazobactam IVPB.. 3.375 Gram(s) IV Intermittent every 8 hours  propofol Infusion 10 MICROgram(s)/kG/Min (4.356 mL/Hr) IV Continuous <Continuous>  senna 2 Tablet(s) Oral at bedtime  sertraline 150 milliGRAM(s) Oral daily  sodium chloride 0.9%. 1000 milliLiter(s) (75 mL/Hr) IV Continuous <Continuous>  traZODone 50 milliGRAM(s) Oral at bedtime    MEDICATIONS  (PRN):  acetaminophen   Tablet .. 650 milliGRAM(s) Oral every 6 hours PRN Temp greater or equal to 38C (100.4F), Mild Pain (1 - 3)  fentaNYL    Injectable 50 MICROGram(s) IV Push every 2 hours PRN shivering  LORazepam   Injectable 1 milliGRAM(s) IV Push every 2 hours PRN myoclonic jerks      Allergies: NKDA    ROS:  SEE BELOW        ICU Vital Signs Last 24 Hrs  T(C): 37.2 (20 Sep 2019 09:03), Max: 37.6 (20 Sep 2019 03:00)  T(F): 99 (20 Sep 2019 09:03), Max: 99.7 (20 Sep 2019 03:00)  HR: 78 (20 Sep 2019 08:06) (75 - 101)  BP: 111/47 (20 Sep 2019 08:00) (98/46 - 144/53)  BP(mean): 61 (20 Sep 2019 08:00) (56 - 72)  ABP: --  ABP(mean): --  RR: 16 (20 Sep 2019 08:00) (14 - 25)  SpO2: 100% (20 Sep 2019 08:06) (100% - 100%)      Mode: CPAP with PS  FiO2: 40  PEEP: 5  PS: 5  ITime: 1  MAP: 6.6  PIP: 12      I&O's Summary    19 Sep 2019 07:01  -  20 Sep 2019 07:00  --------------------------------------------------------  IN: 2411 mL / OUT: 729 mL / NET: 1682 mL    20 Sep 2019 07:01  -  20 Sep 2019 09:16  --------------------------------------------------------  IN: 183.8 mL / OUT: 0 mL / NET: 183.8 mL        Physical Exam:  SEE BELOW                          9.1    14.22 )-----------( 173      ( 20 Sep 2019 06:30 )             26.9       09-20    134<L>  |  105  |  21  ----------------------------<  72  3.7   |  17<L>  |  1.02    Ca    8.2<L>      20 Sep 2019 06:30  Phos  3.1     09-20  Mg     1.8     09-20                      DVT Prophylaxis:                                                            Contraindication:     Advanced Directives:    Discussed with:    Visit Information:  Time spent excluding procedure:  60 cc mins    ** Time is exclusive of billed procedures and/or teaching and/or routine family updates.
79y old F with a PMHx of Schizoaffective disorder, who was sent to the ER form [facility] because of medication refusal, exacerbation of schizoaffective symptoms.  Pt sustained cardiac arrest on .  Food noted in back of mouth during intubation.  Down time at least 10 mins in the hospital.      :  CCU D # 2.  Vented.  sedated.  cooled.  On phenyl gtt.  myoclonic jerks noted.  Poor prognosis for meaningful neuro recovery    :  CCU D # 3.  Vented.  Sedated.  Warming phase.  myoclonic jerks noted.  TTE ()--EF 60%/decreased compliance/RV OK/2+ tr    :  CCU D # 4.  Completed protocol.  Not responsive.  Myoclonic jerks noted.  Remains on low dose phenyl gtt     - Patient seen and examined. Chart reviewed. Events noted. Remains on the vent. Off sedation.      - Overnight events noted. Remains on the vent. Has not received sedation x 2-days.     : Patient remains vented unresponsive but posturing to pain.      : Vented, and with seizure activity over night.      : No events over night, periods of twitching.       : The patients condition remains unchanged.  Unresponsive and twitching.      : No events over night.  Still with twitching.  Plan for a family meeting at 9 Am on : had a family meeting will terminally wean          PAST MEDICAL & SURGICAL HISTORY:  Schizophrenia  Breast cancer  No significant past surgical history      FAMILY HISTORY:  No pertinent family history in first degree relatives: parents , does not know parents medical history      Social Hx:    Allergies    Keppra (Rash)  PT ALLERGIC TO THE GENERIC OF ZYPREXA (Rash)    Intolerances            ICU Vital Signs Last 24 Hrs  T(C): 37.7 (28 Sep 2019 08:00), Max: 37.9 (27 Sep 2019 17:00)  T(F): 99.9 (28 Sep 2019 08:00), Max: 100.3 (27 Sep 2019 17:00)  HR: 82 (28 Sep 2019 08:17) (82 - 113)  BP: 150/81 (28 Sep 2019 08:00) (124/68 - 177/155)  BP(mean): 139 (28 Sep 2019 04:00) (79 - 161)  ABP: --  ABP(mean): --  RR: 18 (28 Sep 2019 08:00) (18 - 18)  SpO2: 100% (28 Sep 2019 08:17) (99% - 100%)      Mode: AC/ CMV (Assist Control/ Continuous Mandatory Ventilation)  RR (machine): 12  TV (machine): 450  FiO2: 40  PEEP: 5  ITime: 1  PIP: 19      I&O's Summary    27 Sep 2019 07:01  -  28 Sep 2019 07:00  --------------------------------------------------------  IN: 1320 mL / OUT: 650 mL / NET: 670 mL                              7.4    11.49 )-----------( 204      ( 27 Sep 2019 07:02 )             23.2       09-27    138  |  103  |  27<H>  ----------------------------<  119<H>  4.6   |  32<H>  |  0.59    Ca    8.6      27 Sep 2019 07:02  Phos  3.9     09-27  Mg     2.2     09-27      MEDICATIONS  (STANDING):  glycopyrrolate Injectable 0.2 milliGRAM(s) IV Push every 4 hours  morphine  - Injectable 2 milliGRAM(s) IV Push every 30 minutes  morphine  Infusion 4 mG/Hr (4 mL/Hr) IV Continuous <Continuous>  scopolamine 1.5 mG Transdermal Patch - Peds 1 Patch Transdermal every 72 hours    MEDICATIONS  (PRN):  LORazepam   Injectable 2 milliGRAM(s) IntraMuscular every 1 hour PRN twitching      DVT Prophylaxis:    Advanced Directives:  Discussed with:    Visit Information: 30 min    ** Time is exclusive of billed procedures and/or teaching and/or routine family updates.
CC:  Cardiac arrest    HPI:    80 yo female with Hx. of Schizoaffective disorder, who was sent to the ER form [facility] because of medication refusal , exacerbation of schizoaffective symptoms.  Pt sustained cardiac arrest on 9/17.  Food noted in back of mouth during intubation.  Down time at least 10 mins.      9/17:  CCU D # 2.  Vented.  sedated.  cooled.  On phenyl gtt.  myoclonic jerks noted.  Poor prognosis for meaningful neuro recovery      PMH:  As above.     PSH:  As above.     FH: Non Contributory other than those listed in HPI    Social History:      MEDICATIONS  (STANDING):  acetaminophen   Tablet .. 650 milliGRAM(s) Oral every 6 hours  aspirin  chewable 81 milliGRAM(s) Oral daily  busPIRone 30 milliGRAM(s) Oral every 8 hours  chlorhexidine 0.12% Liquid 15 milliLiter(s) Oral Mucosa every 12 hours  enoxaparin Injectable 40 milliGRAM(s) SubCutaneous daily  pantoprazole  Injectable 40 milliGRAM(s) IV Push daily  petrolatum Ophthalmic Ointment 1 Application(s) Both EYES every 3 hours  phenylephrine    Infusion 0.1 MICROgram(s)/kG/Min (2.722 mL/Hr) IV Continuous <Continuous>  piperacillin/tazobactam IVPB.. 3.375 Gram(s) IV Intermittent every 8 hours  propofol Infusion 10 MICROgram(s)/kG/Min (4.356 mL/Hr) IV Continuous <Continuous>  senna 2 Tablet(s) Oral at bedtime  sertraline 150 milliGRAM(s) Oral daily  sodium chloride 0.9%. 1000 milliLiter(s) (75 mL/Hr) IV Continuous <Continuous>  traZODone 50 milliGRAM(s) Oral at bedtime    MEDICATIONS  (PRN):  acetaminophen   Tablet .. 650 milliGRAM(s) Oral every 6 hours PRN Temp greater or equal to 38C (100.4F), Mild Pain (1 - 3)  fentaNYL    Injectable 50 MICROGram(s) IV Push every 2 hours PRN shivering      Allergies: NKDA    ROS:  SEE BELOW        ICU Vital Signs Last 24 Hrs  T(C): 34 (18 Sep 2019 07:30), Max: 37.8 (17 Sep 2019 19:10)  T(F): 93.2 (18 Sep 2019 07:30), Max: 100 (17 Sep 2019 19:10)  HR: 65 (18 Sep 2019 07:30) (52 - 91)  BP: 126/84 (18 Sep 2019 07:30) (76/65 - 152/75)  BP(mean): 94 (18 Sep 2019 07:30) (52 - 103)  ABP: --  ABP(mean): --  RR: 13 (18 Sep 2019 07:30) (10 - 24)  SpO2: 100% (18 Sep 2019 07:30) (95% - 100%)      Mode: CPAP with PS  FiO2: 50  PEEP: 5  PS: 12  PIP: 18      I&O's Summary    17 Sep 2019 07:01  -  18 Sep 2019 07:00  --------------------------------------------------------  IN: 6975 mL / OUT: 2165 mL / NET: 4810 mL        Physical Exam:  SEE BELOW                          11.4   14.05 )-----------( 205      ( 18 Sep 2019 06:49 )             34.3       09-18    127<L>  |  101  |  15  ----------------------------<  93  4.3   |  19<L>  |  0.66    Ca    7.5<L>      18 Sep 2019 06:49  Phos  3.4     09-18  Mg     1.7     09-18        CARDIAC MARKERS ( 18 Sep 2019 06:49 )  0.415 ng/mL / x     / x     / x     / x      CARDIAC MARKERS ( 18 Sep 2019 02:01 )  0.580 ng/mL / x     / x     / x     / x            ABG - ( 17 Sep 2019 18:40 )  pH, Arterial: 7.32  pH, Blood: x     /  pCO2: 39    /  pO2: 88    / HCO3: 19    / Base Excess: -5.9  /  SaO2: 95                      DVT Prophylaxis:                                                            Contraindication:     Advanced Directives:    Discussed with:    Visit Information:  Time spent excluding procedure:  75 cc mins    ** Time is exclusive of billed procedures and/or teaching and/or routine family updates.
79y old F with a PMHx of Schizoaffective disorder, who was sent to the ER form [facility] because of medication refusal, exacerbation of schizoaffective symptoms.  Pt sustained cardiac arrest on .  Food noted in back of mouth during intubation.  Down time at least 10 mins in the hospital.      :  CCU D # 2.  Vented.  sedated.  cooled.  On phenyl gtt.  myoclonic jerks noted.  Poor prognosis for meaningful neuro recovery    :  CCU D # 3.  Vented.  Sedated.  Warming phase.  myoclonic jerks noted.  TTE ()--EF 60%/decreased compliance/RV OK/2+ tr    :  CCU D # 4.  Completed protocol.  Not responsive.  Myoclonic jerks noted.  Remains on low dose phenyl gtt     - Patient seen and examined. Chart reviewed. Events noted. Remains on the vent. Off sedation.      - Overnight events noted. Remains on the vent. Has not received sedation x 2-days.     : Patient remains vented unresponsive but posturing to pain.                   PAST MEDICAL & SURGICAL HISTORY:  Schizophrenia  Breast cancer  No significant past surgical history      FAMILY HISTORY:  No pertinent family history in first degree relatives: parents , does not know parents medical history      Social Hx:    Allergies    Keppra (Rash)  PT ALLERGIC TO THE GENERIC OF ZYPREXA (Rash)    Intolerances            ICU Vital Signs Last 24 Hrs  T(C): 37.3 (23 Sep 2019 00:49), Max: 37.8 (22 Sep 2019 09:18)  T(F): 99.1 (23 Sep 2019 00:49), Max: 100.1 (22 Sep 2019 09:18)  HR: 62 (23 Sep 2019 09:09) (62 - 99)  BP: 124/51 (23 Sep 2019 09:00) (104/54 - 156/72)  BP(mean): 67 (23 Sep 2019 09:00) (60 - 93)  ABP: --  ABP(mean): --  RR: 18 (22 Sep 2019 11:00) (10 - 18)  SpO2: 100% (23 Sep 2019 09:09) (93% - 100%)      Mode: AC/ CMV (Assist Control/ Continuous Mandatory Ventilation)  RR (machine): 12  TV (machine): 450  FiO2: 40  PEEP: 5  ITime: 1  PIP: 29      I&O's Summary    22 Sep 2019 07:01  -  23 Sep 2019 07:00  --------------------------------------------------------  IN: 800 mL / OUT: 0 mL / NET: 800 mL                              7.4    7.15  )-----------( 149      ( 23 Sep 2019 06:45 )             21.7       -    139  |  104  |  14  ----------------------------<  112<H>  3.1<L>   |  25  |  0.49<L>    Ca    8.2<L>      23 Sep 2019 06:45  Phos  1.6       Mg     1.9                           MEDICATIONS  (STANDING):  aspirin  chewable 81 milliGRAM(s) Oral daily  chlorhexidine 0.12% Liquid 15 milliLiter(s) Oral Mucosa every 12 hours  enoxaparin Injectable 40 milliGRAM(s) SubCutaneous daily  pantoprazole  Injectable 40 milliGRAM(s) IV Push daily  petrolatum Ophthalmic Ointment 1 Application(s) Both EYES every 3 hours  piperacillin/tazobactam IVPB.. 3.375 Gram(s) IV Intermittent every 8 hours  potassium chloride   Powder 40 milliEquivalent(s) Oral every 4 hours  potassium phosphate / sodium phosphate powder 2 Packet(s) Oral three times a day  senna 2 Tablet(s) Oral at bedtime  sertraline 150 milliGRAM(s) Oral daily  traZODone 50 milliGRAM(s) Oral at bedtime    MEDICATIONS  (PRN):  acetaminophen   Tablet .. 650 milliGRAM(s) Oral every 6 hours PRN Temp greater or equal to 38C (100.4F), Mild Pain (1 - 3)  LORazepam   Injectable 1 milliGRAM(s) IV Push every 2 hours PRN myoclonic jerks      DVT Prophylaxis: lovenox    Advanced Directives:  Discussed with:    Visit Information: 30 min    ** Time is exclusive of billed procedures and/or teaching and/or routine family updates.

## 2019-09-29 NOTE — PROGRESS NOTE ADULT - COMMENTS
Can not obtain
Unobtainable
Can not obtain
Unable to obtain on Vent
Unable to obtain on vent
Unobtainable
Can not obtain
Unobtainable
Can not obtain

## 2019-09-29 NOTE — PROGRESS NOTE ADULT - ASSESSMENT
· Assessment		  80 yo female with Hx. of Schizoaffective disorder, who was sent to the ER form The [facility] assisted living because of medication refusal , exacerbation of schizoaffective symptoms.  Pt sustained cardiac arrest on 9/17.  Food noted in back of mouth during intubation.  Down time at least 10 mins.      Bilateral Cerebral and Brainstem Dysfunction consistent with Anoxic Encephalopathy  In the setting of last night seizures agree with treating with Vimpat and   Continue EEG monitoring for another 24 hrs  Consider MRI with DTI scan  CT did not reveal any acute pathology   EEG diffuse slow back ground with burst suppression with clinical seizures  Discussed with Patient's daughter and Dr. Gonzales.   will follow up.  Prognosis grave.
IMP:    78 y/o female with schizoaffective d/o admitted to CCU following cardiac and resp arrest--early evidence of anoxic brain injury.  Prob aspiration event.  Shock from cardiac and possible infection  Hyponatremia of clinical insignificance     Plan:    Full vent support--cont with PSV--Not candidate for weaning  Start Trophic TF today--RD consult  Titrate down pressors to keep MAP 60  Cont with pip/dexter (4)  HOB > 30  DC IVF once started on TF  Neuro checks  DVT prophy--LMWH    CCU care--d/w CCU staff--d/w daughter at bedside-- All concerns addressed
· Assessment		  78 yo female with Hx. of Schizoaffective disorder, who was sent to the ER form [facility] living because of medication refusal , exacerbation of schizoaffective symptoms.  Pt sustained cardiac arrest on 9/17.  Food noted in back of mouth during intubation.  Down time at least 10 mins.      Bilateral Cerebral and Brainstem Dysfunction consistent with Anoxic Encephalopathy  In the setting of last night seizures agree with treating with Vimpat and no change noted clinically  Continue EEG monitoring  suggests diffuse bilat slowing with burst suppression indicative of Anoxic injury.  Consider MRI with DTI scan revealed findings are compatible with a global anoxic injury  CT did not reveal any acute pathology   EEG diffuse slow back ground with burst suppression with  intermittent twitching.  Discussed with  Dr. Gonzales.   Prognosis grave.  follow up with on call Neurology from Tomorrow .
· Assessment		  80 yo female with Hx. of Schizoaffective disorder, who was sent to the ER form [facility] assisted living because of medication refusal , exacerbation of schizoaffective symptoms.  Pt sustained cardiac arrest on 9/17.  Food noted in back of mouth during intubation.  Down time at least 10 mins.      Bilateral Cerebral and Brainstem Dysfunction consistent with Anoxic Encephalopathy  In the setting of last night seizures agree with treating with Vimpat and   Continue EEG monitoring for another 24 hrs  Consider MRI with DTI scan  CT did not reveal any acute pathology   EEG diffuse slow back ground with burst suppression with clinical seizures  Discussed with Patient's daughter and Dr. Gonzales.   will follow up.  Prognosis grave.
· Assessment		  80 yo female with Hx. of Schizoaffective disorder, who was sent to the ER form [facility] assisted living because of medication refusal , exacerbation of schizoaffective symptoms.  Pt sustained cardiac arrest on 9/17.  Food noted in back of mouth during intubation.  Down time at least 10 mins.      Bilateral Cerebral and Brainstem Dysfunction consistent with Anoxic Encephalopathy  In the setting of last night seizures agree with treating with Vimpat and   Continue EEG monitoring  suggests diffuse bilat slowing with burst suppression indicative of Anoxic injury.  Consider MRI with DTI scan revealed findings are compatible with a global anoxic injury  CT did not reveal any acute pathology   EEG diffuse slow back ground with burst suppression with clinical seizures  Discussed with  Dr. Gonzales.   Prognosis grave.  Will Follow up as needed.
78 y/o female with h/o schizoaffective disorder, breast Ca s/p lumpectomy was admitted on 9/14 for increased confusion. She  was sent to the ER form [facility] because of medication refusal and increased confusion. EMS report, pt has been refusing medications, refusing to answer questions, reporting that people are following her home and that men are sneaking into her room, removing her cloth in public. In the ER the patient was noted febrile and received cephalexin.    1. Low grade fever. Acute respiratory failure. Aspiration pneumonia. Probable UTI resolving. Encephalopathy.  -leukocytosis  -BC x 2reviewed  -s/p ceftin 500 mg PO q12h #  days  -agree with zosyn 3.375 gm IV q8h  -tolerating abx well so far; no side effects noted  -f/u cultures  -respiratory care  -monitor temps  -f/u CBC  -supportive care  2. Other issues:   -care per medicine
78 y/o female with h/o schizoaffective disorder, breast Ca s/p lumpectomy was admitted on 9/14 for increased confusion. She  was sent to the ER form [facility] because of medication refusal and increased confusion. EMS report, pt has been refusing medications, refusing to answer questions, reporting that people are following her home and that men are sneaking into her room, removing her cloth in public. In the ER the patient was noted febrile and received cephalexin.    1. Recurrent febrile syndrome. Acute respiratory failure. Aspiration pneumonia. Encephalopathy.  -leukocytosis corky improved  -encephalopathy is persistent  -BC x 2reviewed  -s/p ceftin 500 mg PO q12h #  days  -s/p zosyn 3.375 gm IV q8h # 6 days  -tolerating abx well so far; no side effects noted  -repeat BC x 2  -repeat CXR shows no active pulmonary disease  -continue to observe off abx therapy  -weaning trial as tolerated  -goal of care meeting   -respiratory care  -monitor temps  -f/u CBC  -supportive care  2. Other issues:   -care per medicine
78 y/o female with h/o schizoaffective disorder, breast Ca s/p lumpectomy was admitted on 9/14 for increased confusion. She  was sent to the ER form [facility] because of medication refusal and increased confusion. EMS report, pt has been refusing medications, refusing to answer questions, reporting that people are following her home and that men are sneaking into her room, removing her cloth in public. In the ER the patient was noted febrile and received cephalexin.    1. Low grade fever. Acute respiratory failure. Aspiration pneumonia. Encephalopathy.  -leukocytosis corky improving  -encephalopathy is persistent  -BC x 2reviewed  -s/p ceftin 500 mg PO q12h #  days  -on zosyn 3.375 gm IV q8h # 7  -tolerating abx well so far; no side effects noted  -continue IV abx coverage  -weaning trial as tolerated  -respiratory care  -monitor temps  -f/u CBC  -supportive care  2. Other issues:   -care per medicine
78 yo female with Hx. of Schizoaffective disorder, who was sent to the ER form [facility] assisted living because of medication refusal , exacerbation of schizoaffective symptoms. EMS report, pt has been refusing medications, refusing to answer questions or speak, reporting that people are following her home and that men are sneaking into her room, removing her cloth in public. In the ER the patient is disoriented, communicating, not c/o pain.     #UTI   #Schizoaffective disorder  #breast CA Hx.  #metabolic encephalopathy likely 2/2 UTI                           Plan:   Continue abx, ID consult  Continue home medications  Robitussin for cough   Heparin for DVT ppx   urine cultures, UA, C/S  Psychiatry consult
78 yo female with Hx. of Schizoaffective disorder, who was sent to the ER form [facility] assisted living because of medication refusal , exacerbation of schizoaffective symptoms. EMS report, pt has been refusing medications, refusing to answer questions or speak, reporting that people are following her home and that men are sneaking into her room, removing her cloth in public. In the ER the patient is disoriented, communicating, not c/o pain.     #UTI   #Schizoaffective disorder  #breast CA Hx.  #metabolic encephalopathy likely 2/2 UTI- improving  #Hyponatremia                          Plan:   Continue abx, ID consult  Continue home medications  Robitussin for cough   Lovenox for DVT ppx   blood cx neg  Psychiatry consult  Nephrology consult for hyponatremia- possible SIADH from psych meds- fluid restriction, monitor BMP
79 schizoaffective disorder, breast ca who was sent to the ER form [facility] assisted living because of medication refusal , exacerbation of schizoaffective symptoms with renal evaluation of  hyponatremia. 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     Mild Hyponatremia - likely compatible with SIADH   Na stable   maintain NS hydration if no po/feeds  -TSH and cortisol ok  -Limit free water via OGT   - All IVPB's in isotonic saline     Arrest  -Protocol per ICU  -GOC based on prognosis      Will sign off, call with any changes/issues/concerns  d/w  family and ICU staff
79 schizoaffective disorder, breast ca who was sent to the ER form [facility] assisted living because of medication refusal , exacerbation of schizoaffective symptoms with renal evaluation of  hyponatremia. 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     Hyponatremia  -urine studies, lytes likely compatible with SIADH  -Ok to maintain NS for now with  hypotension, if continued decrement in Na may need to dc and consider TF's if within GOC  -TSH and cortisol ok  -Limit free water    Arrest  -Protocol per ICU  -GOC based on prognosis  -ICU level of care    d/c with family and ICU staff
79 schizoaffective disorder, breast ca who was sent to the ER form [facility] assisted living because of medication refusal , exacerbation of schizoaffective symptoms with renal evaluation of  hyponatremia. 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     Mild Hyponatremia - likely compatible with SIADH   Na stable   maintain NS for now with hypotension  if Na trend down and remains NPO then could utilize 1.5% hypertonic Saline   -TSH and cortisol ok  -Limit free water via OGT   - All IVPB's in isotonic saline     Arrest  -Protocol per ICU  -GOC based on prognosis  -ICU level of care    prognosis poor     d/w  family and ICU staff
79y old F:  Acute Resp Failure - On Vent  Aspiration PNA  Airway protection  s/p Cardiac Arrest  Evidence of anoxic brain injury  s/p TTM - Completed  Hyponatremia of clinical insignificance     Plan:  CCU    Had a family meeting on 9/28 with the patient 2 daughters and son.  At this time in accordance with the patients known prior wishes she will be transitioned to comfort measures and terminally wean    MS drip and Ativan for comfort  Scopolamine patch and glycopyrrolate for secretions
80 y/o female with h/o schizoaffective disorder, breast Ca s/p lumpectomy was admitted on 9/14 for increased confusion. She  was sent to the ER form [facility] because of medication refusal and increased confusion. EMS report, pt has been refusing medications, refusing to answer questions, reporting that people are following her home and that men are sneaking into her room, removing her cloth in public. In the ER the patient was noted febrile and received cephalexin.    1. Low grade fever resolving. Acute respiratory failure. Aspiration pneumonia. Encephalopathy.  -leukocytosis corky improving  -encephalopathy is persistent  -BC x 2reviewed  -s/p ceftin 500 mg PO q12h #  days  -on zosyn 3.375 gm IV q8h # 5  -tolerating abx well so far; no side effects noted  -continue IV abx coverage  -weaning trial  -respiratory care  -monitor temps  -f/u CBC  -supportive care  2. Other issues:   -care per medicine
80 y/o female with h/o schizoaffective disorder, breast Ca s/p lumpectomy was admitted on 9/14 for increased confusion. She  was sent to the ER form [facility] because of medication refusal and increased confusion. EMS report, pt has been refusing medications, refusing to answer questions, reporting that people are following her home and that men are sneaking into her room, removing her cloth in public. In the ER the patient was noted febrile and received cephalexin.    1. Low grade fever. Pyuria. Probable UTI. Encephalopathy.  -BC x 2reviewed  -on ceftin 500 mg PO q12h # 2  -tolerating abx well so far; no side effects noted  -f/u cultures  -continue oral abx for a few more days  -monitor temps  -f/u CBC  -supportive care  2. Other issues:   -care per medicine
80 y/o female with h/o schizoaffective disorder, breast Ca s/p lumpectomy was admitted on 9/14 for increased confusion. She  was sent to the ER form [facility] because of medication refusal and increased confusion. EMS report, pt has been refusing medications, refusing to answer questions, reporting that people are following her home and that men are sneaking into her room, removing her cloth in public. In the ER the patient was noted febrile and received cephalexin.    1. Recurrent febrile syndrome. Acute respiratory failure. Aspiration pneumonia. Encephalopathy.  -leukocytosis corky improved  -encephalopathy is persistent  -BC x 2reviewed  -s/p ceftin 500 mg PO q12h #  days  -on zosyn 3.375 gm IV q8h # 8  -tolerating abx well so far; no side effects noted  -repeat BC x 2  -repeat CXR  -continue IV abx coverage  -weaning trial as tolerated  -respiratory care  -monitor temps  -f/u CBC  -supportive care  2. Other issues:   -care per medicine
80 y/o female with h/o schizoaffective disorder, breast Ca s/p lumpectomy was admitted on 9/14 for increased confusion. She  was sent to the ER form [facility] because of medication refusal and increased confusion. EMS report, pt has been refusing medications, refusing to answer questions, reporting that people are following her home and that men are sneaking into her room, removing her cloth in public. In the ER the patient was noted febrile and received cephalexin.    1. Low grade fever. Acute respiratory failure. Aspiration pneumonia. Encephalopathy.  -leukocytosis  -BC x 2reviewed  -s/p ceftin 500 mg PO q12h #  days  -on zosyn 3.375 gm IV q8h # 2  -tolerating abx well so far; no side effects noted  -obtain sputum c/s  -continue IV abx coverage  -respiratory care  -monitor temps  -f/u CBC  -supportive care  2. Other issues:   -care per medicine
80 yo female with Hx. of Schizoaffective disorder, who was sent to the ER form [facility] assisted living because of medication refusal , exacerbation of schizoaffective symptoms. EMS report, pt has been refusing medications, refusing to answer questions or speak, reporting that people are following her home and that men are sneaking into her room, removing her cloth in public. In the ER the patient is disoriented, communicating, not c/o pain.     #UTI   #Schizoaffective disorder  #breast CA Hx.                           Plan:   Continue abx, ID consult  Continue home medications  Robitussin for cough   Heparin for DVT ppx   urine cultures, UA, C/S  Psychiatry consult
Ptient with schizoaffective disorger  s/p cardiac arrest likely from aspiration  concern over anoxic encephalopathy  ekg no st changes right bunle  will initiate hypothermia protocol  check labs  check 2d echo  lovenox dvt prophylaxis  protonix GI prophylaxis  bp now seems stable  family  notified of event
79y old F:  Acute Resp Failure - On Vent  Aspiration PNA  Airway protection  s/p Cardiac Arrest  Evidence of anoxic brain injury  s/p TTM - Completed  Hyponatremia of clinical insignificance     Plan:  CCU    Had a family meeting with the patient 2 daughters and son.  At this time in accordance with the patients known prior wishes she will be transitioned to comfort measures and terminally wean    MS drip and Ativan for comfort  Scopolamine patch and glycopyrrolate for secretions
79y old F:  Acute Resp Failure - On Vent  Aspiration PNA  Airway protection  s/p Cardiac Arrest  Evidence of anoxic brain injury  s/p TTM - Completed  Hyponatremia of clinical insignificance     Plan:  CCU    PULM: No weaning given mental status    Cardio: Off pressors, continue ASA    Renal: Replace Lytes    GI: Continue feeds    Neuro: With anoxic encephalopathy.  Continue Kepra and added Vimpat 9/24, EEG to be read, MRI with DTI on 9/25.  No seizures on EEG    Lovenox DVT Prophylaxis
79y old F:  Acute Resp Failure - On Vent  Aspiration PNA  Airway protection  s/p Cardiac Arrest  Evidence of anoxic brain injury  s/p TTM - Completed  Hyponatremia of clinical insignificance     Plan:  CCU    PULM: No weaning given mental status    Cardio: Off pressors, continue ASA    Renal: Replace Lytes    GI: Continue feeds    Neuro: With anoxic encephalopathy.  Continue Kepra and added Vimpat, EEG to be read, MRI with DTI as well    Lovenox DVT Prophylaxis
79y old F:  Acute Resp Failure - On Vent  Aspiration PNA  Airway protection  s/p Cardiac Arrest  Evidence of anoxic brain injury  s/p TTM - Completed  Hyponatremia of clinical insignificance     Plan:  CCU    PULM: No weaning given mental status    Cardio: Off pressors, continue ASA    Renal: Replace Lytes as needed    GI: Continue feeds    Neuro: With anoxic encephalopathy.  Continue Keppra and added Vimpat 9/24, EEG to be read, MRI on 9/25 C/W Anoxic encephalopathy.  No seizures on EEG    Lovenox DVT Prophylaxis    D/W the patient Son palomo in detail on 9/26 and 9/25 the patient daughter.  Conversation included the events of the entire hospital stay, her current condition, and EEG and MRI results.  The son and daughter stated the family will have to talk as to how to proceed with either trach and PEG or comfort measures.      9/27 is a family meeting at 9 AM
IMP:    78 y/o female with schizoaffective d/o admitted to CCU following cardiac and resp arrest--early evidence of anoxic brain injury.  Prob aspiration event.  Shock from cardiac and possible infection    Plan:    Full vent support--cont with PSV--Not candidate for weaning  Cont with cooling  Titrate down pressors to keep MAP 60  Cont with pip/dexter (2)  Check TTE  NPO  HOB > 30  IVF  Neuro checks  DVT prophy--LMWH    CCU care--d/w CCU staff
79y old F:  Acute Resp Failure - On Vent  Aspiration PNA  Airway protection  s/p Cardiac Arrest  Evidence of anoxic brain injury  s/p TTM - Completed  Hyponatremia of clinical insignificance     Plan:  CCU    PULM: No weaning given mental status    Cardio: Off pressors, continue ASA    Renal: Replace Lytes    GI: Continue feeds    Neuro: With anoxic encephalopathy.  Continue Kepra, EEG and Neuro to see the patient      Lovenox DVT Prophylaxis
79y old F:  Acute Resp Failure - On Vent  Aspiration PNA  Airway protection  s/p Cardiac Arrest  Evidence of anoxic brain injury  s/p TTM - Completed  Hyponatremia of clinical insignificance     Plan:  CCU    PULM: No weaning given mental status    Cardio: Off pressors, continue ASA    Renal: Replace Lytes as needed    GI: Continue feeds    Neuro: With anoxic encephalopathy.  Continue Keppra and added Vimpat 9/24, EEG to be read, MRI on 9/25 C/W Anoxic encephalopathy.  No seizures on EEG    Lovenox DVT Prophylaxis    D/W the patient Son palomo in detail on 9/26 and 9/25 the patient daughter.  Conversation included the events of the entire hospital stay, her current condition, and EEG and MRI results.  The son and daughter stated the family will have to talk as to how to proceed with either trach and PEG or comfort measures.
79y old F:  Acute Resp Failure - On Vent  Aspiration PNA  Airway protection  s/p Cardiac Arrest  Evidence of anoxic brain injury  s/p TTM - Completed  Hyponatremia of clinical insignificance     Plan:  Cont CCU Care  Serial Neuro Exams  Check CT Head today  BP Stable - Off pressors  Cont Mech Vent   TF @ goal  IV Zosyn for Asp PNA  Replete lytes  Monitor renal function  Strict I/O's  DVT prophylaxis - Lovenox  Plan discussed with nursing staff with all questions answered in detail.
79y old F:  Acute Resp Failure - On Vent  Aspiration PNA  Airway protection  s/p Cardiac Arrest  Evidence of anoxic brain injury  s/p TTM - Completed  Hyponatremia of clinical insignificance     Plan:  Cont CCU Care  Serial Neuro Exams  BP Stable - Off pressors  Cont Mech Vent   TF @ goal  IV Zosyn for Asp PNA  Replete lytes  Monitor renal function  Strict I/O's  DVT prophylaxis - Lovenox  Plan discussed with nursing staff with all questions answered in detail.
IMP:    78 y/o female with schizoaffective d/o admitted to CCU following cardiac and resp arrest--early evidence of anoxic brain injury.  Prob aspiration event.  Shock from cardiac and possible infection  Hyponatremia of clinical insignificance     Plan:    Full vent support--cont with PSV--Not candidate for weaning  Cont with warming   Titrate down pressors to keep MAP 60  Cont with pip/dexter (3)  NPO  HOB > 30  IVF  Neuro checks  DVT prophy--LMWH    CCU care--d/w CCU staff--d/w daughter at bedside-- All concerns addressed

## 2019-09-29 NOTE — PROGRESS NOTE ADULT - GASTROINTESTINAL DETAILS
soft/no distention/bowel sounds normal
no distention/soft
soft/nontender
soft/no distention
soft/nontender
bowel sounds normal/soft/no distention
no distention/soft/nontender/normal
no distention/bowel sounds normal/soft
soft/nontender

## 2019-09-29 NOTE — PROGRESS NOTE ADULT - CARDIOVASCULAR DETAILS
positive S2/positive S1
positive S2/positive S1
positive S1/positive S2
tachycardia

## 2019-09-29 NOTE — PROGRESS NOTE ADULT - PROVIDER SPECIALTY LIST ADULT
Critical Care
Hospitalist
Infectious Disease
Nephrology
Critical Care

## 2019-09-29 NOTE — PROGRESS NOTE ADULT - REASON FOR ADMISSION
UTI, AMS , Schizophrenia.

## 2019-09-29 NOTE — PROGRESS NOTE ADULT - RS GEN PE MLT RESP DETAILS PC
breath sounds equal/no rhonchi/no rales
breath sounds equal
no rhonchi/no rales/no wheezes/breath sounds equal
breath sounds equal
no rales/no rhonchi/no wheezes/breath sounds equal
diminished breath sounds, L/diminished breath sounds, R
diminished breath sounds, R/diminished breath sounds, L
no rhonchi/no rales/breath sounds equal/no wheezes
no wheezes/no rhonchi/no rales/breath sounds equal
breath sounds equal
breath sounds equal

## 2019-09-29 NOTE — PROGRESS NOTE ADULT - GASTROINTESTINAL
detailed exam
Soft, non-tender, no hepatosplenomegaly, normal bowel sounds
detailed exam

## 2019-09-30 ENCOUNTER — TRANSCRIPTION ENCOUNTER (OUTPATIENT)
Age: 79
End: 2019-09-30

## 2019-09-30 VITALS — TEMPERATURE: 99 F

## 2019-09-30 RX ORDER — TRAZODONE HCL 50 MG
1 TABLET ORAL
Qty: 0 | Refills: 0 | DISCHARGE

## 2019-09-30 RX ORDER — MORPHINE SULFATE 50 MG/1
6 CAPSULE, EXTENDED RELEASE ORAL
Qty: 0 | Refills: 0 | DISCHARGE
Start: 2019-09-30

## 2019-09-30 RX ORDER — SENNA PLUS 8.6 MG/1
2 TABLET ORAL
Qty: 0 | Refills: 0 | DISCHARGE

## 2019-09-30 RX ORDER — SERTRALINE 25 MG/1
1.5 TABLET, FILM COATED ORAL
Qty: 0 | Refills: 0 | DISCHARGE

## 2019-09-30 RX ORDER — LANOLIN ALCOHOL/MO/W.PET/CERES
2 CREAM (GRAM) TOPICAL
Qty: 0 | Refills: 0 | DISCHARGE

## 2019-09-30 RX ORDER — ROBINUL 0.2 MG/ML
0.2 INJECTION INTRAMUSCULAR; INTRAVENOUS
Qty: 0 | Refills: 0 | DISCHARGE
Start: 2019-09-30

## 2019-09-30 RX ORDER — LETROZOLE 2.5 MG/1
1 TABLET, FILM COATED ORAL
Qty: 0 | Refills: 0 | DISCHARGE

## 2019-09-30 RX ORDER — ACETAMINOPHEN 500 MG
2 TABLET ORAL
Qty: 0 | Refills: 0 | DISCHARGE

## 2019-09-30 RX ADMIN — MORPHINE SULFATE 2 MILLIGRAM(S): 50 CAPSULE, EXTENDED RELEASE ORAL at 02:24

## 2019-09-30 RX ADMIN — MORPHINE SULFATE 6 MG/HR: 50 CAPSULE, EXTENDED RELEASE ORAL at 12:49

## 2019-09-30 RX ADMIN — Medication 2 MILLIGRAM(S): at 09:46

## 2019-09-30 RX ADMIN — ROBINUL 0.2 MILLIGRAM(S): 0.2 INJECTION INTRAMUSCULAR; INTRAVENOUS at 17:28

## 2019-09-30 RX ADMIN — Medication 2 MILLIGRAM(S): at 05:31

## 2019-09-30 RX ADMIN — MORPHINE SULFATE 2 MILLIGRAM(S): 50 CAPSULE, EXTENDED RELEASE ORAL at 02:54

## 2019-09-30 RX ADMIN — ROBINUL 0.2 MILLIGRAM(S): 0.2 INJECTION INTRAMUSCULAR; INTRAVENOUS at 02:25

## 2019-09-30 RX ADMIN — ROBINUL 0.2 MILLIGRAM(S): 0.2 INJECTION INTRAMUSCULAR; INTRAVENOUS at 14:05

## 2019-09-30 RX ADMIN — SCOPALAMINE 1 PATCH: 1 PATCH, EXTENDED RELEASE TRANSDERMAL at 09:23

## 2019-09-30 RX ADMIN — ROBINUL 0.2 MILLIGRAM(S): 0.2 INJECTION INTRAMUSCULAR; INTRAVENOUS at 05:30

## 2019-09-30 RX ADMIN — ROBINUL 0.2 MILLIGRAM(S): 0.2 INJECTION INTRAMUSCULAR; INTRAVENOUS at 09:42

## 2019-09-30 NOTE — DISCHARGE NOTE PROVIDER - HOSPITAL COURSE
80 y/o female with schizoaffective d/o sent in from assistant for behavior issues suffered cardiac arrest while in hospital.  After hypothermic protocol, pt never recovered neurologically and now suffers from anoxic brain injury    Pt has decided to remove vent support and placed her on morphine gtt        She planned for tx to inpt hospice 78 y/o female with schizoaffective d/o sent in from assistant for behavior issues suffered cardiac arrest while in hospital.  After hypothermic protocol, pt never recovered neurologically and now suffers from anoxic brain injury    Pt has decided to remove vent support and placed her on morphine gtt        She is planned for tx to inpt hospice today 78 y/o female with schizoaffective d/o sent in from assistant for behavior issues suffered cardiac arrest while in hospital.  After hypothermic protocol, pt never recovered neurologically and now suffers from anoxic brain injury    Family in accordance with pt's known wishes, decided to remove vent support and placed her on morphine gtt        She is planned for tx to inpt hospice today

## 2019-09-30 NOTE — DISCHARGE NOTE NURSING/CASE MANAGEMENT/SOCIAL WORK - PATIENT PORTAL LINK FT
You can access the FollowMyHealth Patient Portal offered by [facility] by registering at the following website: [email or web address]. By joining [employer]’s FollowMyHealth portal, you will also be able to view your health information using other applications (apps) compatible with our system.

## 2019-09-30 NOTE — DISCHARGE NOTE PROVIDER - NSDCCPCAREPLAN_GEN_ALL_CORE_FT
PRINCIPAL DISCHARGE DIAGNOSIS  Diagnosis: Cardiac arrest  Assessment and Plan of Treatment:       SECONDARY DISCHARGE DIAGNOSES  Diagnosis: Anoxic brain injury  Assessment and Plan of Treatment:

## 2019-10-04 DIAGNOSIS — E44.0 MODERATE PROTEIN-CALORIE MALNUTRITION: ICD-10-CM

## 2019-10-04 DIAGNOSIS — N39.0 URINARY TRACT INFECTION, SITE NOT SPECIFIED: ICD-10-CM

## 2019-10-04 DIAGNOSIS — E22.2 SYNDROME OF INAPPROPRIATE SECRETION OF ANTIDIURETIC HORMONE: ICD-10-CM

## 2019-10-04 DIAGNOSIS — R57.8 OTHER SHOCK: ICD-10-CM

## 2019-10-04 DIAGNOSIS — F25.9 SCHIZOAFFECTIVE DISORDER, UNSPECIFIED: ICD-10-CM

## 2019-10-04 DIAGNOSIS — I46.8 CARDIAC ARREST DUE TO OTHER UNDERLYING CONDITION: ICD-10-CM

## 2019-10-04 DIAGNOSIS — J69.0 PNEUMONITIS DUE TO INHALATION OF FOOD AND VOMIT: ICD-10-CM

## 2019-10-04 DIAGNOSIS — J96.00 ACUTE RESPIRATORY FAILURE, UNSPECIFIED WHETHER WITH HYPOXIA OR HYPERCAPNIA: ICD-10-CM

## 2019-10-04 DIAGNOSIS — G93.1 ANOXIC BRAIN DAMAGE, NOT ELSEWHERE CLASSIFIED: ICD-10-CM

## 2019-10-04 DIAGNOSIS — G93.41 METABOLIC ENCEPHALOPATHY: ICD-10-CM

## 2019-10-18 NOTE — ED PROVIDER NOTE - PSYCHIATRIC AFFECT
FLAT
36M, no sig pmh, presents with low back pain x 1 week, worsening over past 2 days. sx began day after he lifted an air conditioner out of the window. this morning, pt woke up at 4 am with severe back pain, went to bathroom. When got to bathroom, pain was extremely severe, he felt lightheaded and fell to ground. Pt's wife heard him fall, went in bathroom and tried to help him stand up and he fell down again. Did strike head 2nd time, no LOC. Preceding falls denies any cp, sob, palpitations, n/v, vision changes. Subsequent to falling no neck pain, +mild generalized headache, no vision changes, no numbness or weakness. No urinary or fecal incontinence. No hx IVDU. No recent spine procedures.     PE: NAD, NCAT, MMM, Trachea midline, Normal conjunctiva, lungs CTAB, S1/S2 RRR, Normal perfusion, 2+ radial pulses bilat, Abdomen Soft, NTND, No rebound/guarding, No LE edema, No deformity of extremities, No rashes,  No focal motor or sensory deficits. No midline C, T, L ttp. CN II-XII intact. Visual fields intact. EOMI, PERRLA. Facial sensation equal bilat. Smile and eye closure equal bilat. Hearing intact bilat. Palate elevation equal, tongue protrusion midline. Lateral head rotation equal bilat. 5/5 strength UE. 5/5 dorsi/plantarflexion bilat LE. limited hip flexion 2/2 pain, +SLR bilat Sensation grossly intact bilat UE and LE, no saddle anesthesia. +R lumbar and upper buttock ttp.    Low back pain, without red flag sx. Re fall, no recorded LOC, will check EKG and cbc, cmp to eval for abnormality, but fall appears c/w pain. Pt now with minimal headache, neuro exam wnl, very low suspicion of sig intracranial traumatic injury. Give symptomatic relief. Re-eval - Andre Mcdaniel MD

## 2019-10-29 NOTE — ED BEHAVIORAL HEALTH ASSESSMENT NOTE - GAIT / STATION
Other (Free Text): Discuss with patient on the CO2 laser to help with filling and firming the skin. Information given to see Dr. Nikolai Murillo in [place]. \\nRecommend to do fillers if needed for the checks. Pt will rtc once she decided on what she wants to.
Note Text (......Xxx Chief Complaint.): This diagnosis correlates with the
Detail Level: Zone
Normal gait / station

## 2019-10-31 NOTE — PROGRESS NOTE BEHAVIORAL HEALTH - NSBHCONSULTWORKUP_PSY_A_CORE
JAVAN  Doing well, +FM  Denies LOF/VB/uctx  Mode of delivery:   anticipated  RTC 1 week  Hydration and rest for the cold encouraged no

## 2020-05-23 NOTE — PHYSICAL THERAPY INITIAL EVALUATION ADULT - CRITERIA FOR SKILLED THERAPEUTIC INTERVENTIONS
S: Pt states she is doing well. Reports pain is well controlled. Denies back pain/ HA/ N/V/ pruritis. Ambulating.     O:   Vitals:    05/22/20 2150 05/23/20 0153 05/23/20 0456 05/23/20 1446   BP: 113/68 110/66 122/65 121/77   Pulse: 83 78 84 79   Resp: 16 16 17 16   Temp: 36.9 °C (98.4 °F) 36.8 °C (98.2 °F) 36.8 °C (98.2 °F) 37 °C (98.6 °F)   TempSrc: Oral Oral Oral    SpO2:       Weight:       Height:             CV: normal rate  Resp: spontaneous, non labored    A&P: 49 year old y/o F s/p c/s, POD #1 who is doing well, pain well managed. Medical record reviewed , no change in patient's status. Further management per OB service.        anticipated equipment needs at discharge/rehab potential/impairments found/functional limitations in following categories/predicted duration of therapy intervention/risk reduction/prevention/therapy frequency/anticipated discharge recommendation

## 2021-04-15 NOTE — ED PROVIDER NOTE - RESPIRATORY [-], MLM
[Time Spent: ___ minutes] : I have spent [unfilled] minutes of time on the encounter. no exertional dyspnea/no hemoptysis/no orthopnea/no shortness of breath

## 2021-08-02 NOTE — PHYSICAL THERAPY INITIAL EVALUATION ADULT - LIVES WITH, PROFILE
Assisted Living Detail Level: Simple Additional Notes: Patient consent was obtained to proceed with the visit and recommended plan of care after discussion of all risks and benefits, including the risks of COVID-19 exposure.

## 2021-11-30 NOTE — ED BEHAVIORAL HEALTH ASSESSMENT NOTE - ORIENTATION OTHER
vomiting, abd cramping.  abd soft.  labs reviewed  improved.  dc with f/u
knows year, thinks it is March, does not know date

## 2022-03-26 NOTE — BEHAVIORAL HEALTH ASSESSMENT NOTE - SUMMARY
Labs/Imaging Studies/Medications 79yo woman with history of depression and anxiety, recently admitted to [facility] for 1.5 months for depression and SI (discharged 10/3/18) who presented with fever and cough, found to have +enterovirus on RVP and admitted for sepsis, psychiatry consulted for agitation and medication management.  Patient is calm on presentation, perseverates on importance of her psychiatric medications which she needs to feel better.  Denies SI/HI/I/P, denies AVH.  Denies depressed mood.  Patient has been in good behavioral control.      Plan:    1. no CO 1:1 required. Does not meet criteria for inpatient psych admission at this time.  2. MDD: continue with Zoloft 150mg daily and QUEtiapine 100 milliGRAM(s) Oral three times a day  3. anxiety: continue with LORazepam     Tablet 1 milliGRAM(s) Oral two times a day; We recommend Seroquel 25mg PO q6h PRN for anxiety  4. insomnia: continue with melatonin 3 milliGRAM(s) Oral at bedtime and traZODone 50 milliGRAM(s) Oral at bedtime  5. agitation: Seroquel 25mg PO q6h PRN for agitation; Ativan 0.5-1mg IM/IV for severe agitation. 77yo woman with history of depression and anxiety, recently admitted to [facility] for 1.5 months for depression and SI (discharged 10/3/18) who presented with fever and cough, found to have +enterovirus on RVP and admitted for sepsis, psychiatry consulted for agitation and medication management.  Patient is calm on presentation, perseverates on importance of her psychiatric medications which she needs to feel better.  Denies SI/HI/I/P, denies AVH.  Denies depressed mood.  Patient has been in good behavioral control.      Plan:    1. no CO 1:1 required. Does not meet criteria for inpatient psych admission at this time.  2. MDD: continue with Zoloft 150mg daily and QUEtiapine 100 milliGRAM(s) Oral three times a day  3. anxiety: continue with LORazepam     Tablet 1 milliGRAM(s) Oral two times a day;   4. insomnia: continue with melatonin 3 milliGRAM(s) Oral at bedtime and traZODone 50 milliGRAM(s) Oral at bedtime  5. agitation: Seroquel 25mg PO q6h PRN for anxiety/ mild agitation; Ativan 0.5-1mg IM/IV for severe agitation.

## 2022-04-12 NOTE — ED ADULT NURSE NOTE - IN THE PAST 12 MONTHS HAVE YOU USED DRUGS OTHER THAN THOSE REQUIRED FOR MEDICAL REASON?
Neurosurgery Consultation    Requesting Provider:  Chau Segla 11/29/2010   • Arm paresthesia, left 4/7/[facility].   • COVID-19 vaccine administered 12/30/2021    pfizer booster    • Depression 10/17/No

## 2023-02-21 NOTE — PATIENT PROFILE ADULT - NSCANCSCRNDXHH_GEN_A_NUR
[7] : 7 [Dull/Aching] : dull/aching [Sharp] : sharp [Intermittent] : intermittent [Household chores] : household chores [Injection therapy] : injection therapy [Sitting] : sitting [Lying in bed] : lying in bed [Neck] : neck [0] : 0 [Radiating] : radiating [Tingling] : tingling [] : This patient has had an injection before: no [FreeTextEntry1] : right hip, right shoulder, left neck  [FreeTextEntry6] : numbness [FreeTextEntry7] : left scapula, arm to the hands and fingers  no

## 2023-03-14 NOTE — ED ADULT NURSE NOTE - CHIEF COMPLAINT QUOTE
March 14, 2023       Jack Andrews MD  [address]  [facility] [address]  Via In Basket      Patient: Stephan Henderson   YOB: 1955   Date of Visit: 3/14/2023       Dear Dr. Andrews:    Thank you for referring Stephan Henderson to me for evaluation. Below are my notes for this visit with him.    If you have questions, please do not hesitate to call me. I look forward to following your patient along with you.      Sincerely,        Levy El DO        CC: No Recipients  Levy El DO  3/14/2023 12:10 PM  Signed  Interventional Cardiology  Progress Note    Reason for Visit: Cardiovascular revisit  Last visit: 7/31/2020; 1/18/2021; 6/22/2021; 9/2/2022; 12/3/2022; 3/14/2023  PCP: Jack Andrews MD  Pulmonology:  Dr. Gorge Carson  UROLOGY: Dr. Jeffrey Hyatt  Cardiology CHF: Dr. Andres Rai  Electrophysiology: Dr. William Silverman  Interventional Cardiology at [facility]: Dr. Todd Newman    The patient was seen and examined and the chart was reviewed this date.  Thank you for your referral.  My impressions and recommendations are as follows:    IMPRESSIONS:  1.  Aortic Valve stenosis s/p AVR 2018 and TAVR 2022  - 2/13/2018 s/p Edward's # 25, Magna Ease Bioprosthetic 2/13/2018  - 2/27/2018 s/p Emergent Pericardiocentesis 750 mL 2/27/2018   - 9/6/2022 s/p Severe AVR AS 0.79 mmHg, mean 54, P2P 71,  Mildly elevated RHC PCW 23 mmHg, LHC with dominant left system minimal CAD, AVR crossed JR4 Straight tipped 035, via RBV, RRA at [facility] Dr. El for AVR - AS  - 11/14/2022 s/p TAVR #29 mm Medtronic Evolut PRO valve in valve GSAM Paula Newman, Rivas  - on ASA, clopidogrel, metoprolol succinate, amlodipine, furosemide, losartan     2. Dyspnea - stable, chronic  - Unremarkable pulmonology work-up with unremarkable CT chest for pulmonary embolism, nocturnal oxygen is intolerant to CPAP but will retry, diastolic dysfunction, obesity  - 7/9/2019 Mildly elevated RHC PCW 22 mmHg, [facility] with dominant  Pt took "half an Ativan" after difficulty sleeping last night.  (Ativan and Zyprexa prescribed meds.)  Drowsy on arrival. left system minimal CAD, AVR not crossed via RBV, RRA at [facility] Dr. El  - 7/9/2019 CANDIDA with unremarkable AVR, mild MR, no PFO/ASD by agitated saline at [facility] for HASTINGS post AVR from 2/2018  - 4/7/2021 Exercise RHC at [facility] with Dr. Rai  -  has been seen by pulmonology, advanced heart failure, unremarkable PFT's for the most part, cardiac MRI also really unremarkable  - following with Dr. Rai at [facility]   - did not meet criteria for CRT-P previously   - suspect due to obesity and deconditioning  - also undergoing eval with neuro for possible myesthesia gravis      3. Nonocclusive Coronary artery disease  - 9/7/2011 preop cardiac cath for possible aortic valve repair but measured as mild AS  - 1/24/2018 Unremarkable RHC, LHC for preop AVR  - 7/9/2019 CANDIDA with unremarkable AVR, mild MR, no PFO/ASD by agitated saline at [facility] for HASTINGS post AVR from 2/2018  - 7/9/2019 Mildly elevated RHC PCW 22 mmHg, LHC with dominant left system minimal CAD, AVR not crossed via RBV, RRA at [facility] Dr. El  - 9/2/2020 RHC/LHC w/ elevated PCWP and mildly elevated RV and PA pressures at [facility]  - 9/6/2022 s/p Severe AVR AS 0.79 mmHg, mean 54, P2P 71,  Mildly elevated RHC PCW 23 mmHg, LHC with dominant left system minimal CAD, AVR crossed JR4 Straight tipped 035, via RBV, RRA at [facility] Dr. El for AVR - AS  - on ASA, clopidogrel, metoprolol succinate, amlodipine, furosemide, losartan     4. Hypertension With diastolic dysfunction NYHA class II-III   - 9/2020 no longer on carvedilol  - on ASA, clopidogrel, metoprolol succinate, amlodipine, furosemide, losartan     3/14/2023  Restart Losartan 50 mg once daily  Keep blood pressure log and heart rate (if available)  - take morning reading prior to medication(s) and then 1 hour after medication(s) and randomly mid day  - send readings via [employer] for review in a week     5. Dyslipidemia   - not on statin, Pravachol given concerns for transaminitis     6.  Sick sinus syndrome with RBBB and  typical atrial flutter  - s/p cavotricuspid isthmus ablation 10/3/2022  - s/p dual chamber pacemaker ([employer] MRI) 10/3/2022     7. Transaminitis from Hepatosteatosis and cholecystitis     8. Diabetes Mellitus on Metformin     9. DESTINY s/p UPPP   - seen by pulmonology, compliant with CPAP since 2020     10.  Morbid obesity  - Aware of portion control     11. OA Bilateral knees  - 2/27/2023 s/p Left total knee arthroplasty Dr. Gupta    RECOMMENDATIONS:  1. Regarding his CVD status  - Cardiac rehab phase 2 at [facility] when able 3/14/2023  - Renew Echocardiogram per TAVR protocol  - Refills #90x3 3/14/2023 amlodipine     - If syncope, chest pain with exertion, or worsening symptoms occur advised to notify office or go to nearest ER  2. Medications and labs reviewed  * Prophylactic antibiotics for infective endocarditis for dental work or minor/major surgery given AVR  30-60 minutes prior procedure  If PCN allergy:  - Keflex 2 GM once   OR  - Clindamycin 600 MG once OR  - Azithromycin 500 MG once OR     - Continue all other medications; ASA 81 mg, clopidogrel 75 mg 3-month supply post-TAVR  - Antihypertensives; Amlodipine 10 mg, Toprol-XL 50 mg, Lasix 40 mg, losartan 50 mg daily  - Dyslipidemia; Zetia 10 mg  - Renew FLP as directed   3. Exercise at least 150 minutes aerobic activity per week as tolerated.   4. Emphasized the need for low salt, low fat, low cholesterol diet.  5. Return to clinic 6 months [facility] site upon completion of aforementioned studies or sooner for concerns.  All questions were answered to the patient's satisfaction and to the best of my abilities.      SUBJECTIVE:   Stephan Henderson is a 67 year old  who presents on 3/14/2023 for a cardiovascular revisit. He has a history of aortic valve stenosis which was to be repaired but measured mild by cardiac cath 2011 now s/p Edward's # 25, Magna Ease Bioprosthetic 2/13/2018 and s/p Emergent  Pericardiocentesis 750 mL 2/27/2018; 11/14/2022 s/p TAVR valve in valve [facility] Rivas Avery.  He has no appreciable Coronary artery disease by [facility] 2011 and 1/24/2018.  He also has HTN, HLD not treated secondary to transaminitis, RBBB, metabolic syndrome, UPPP with DESTINY as well as transaminitis and regular alcohol use, typical atrial flutter  s/p cavotricuspid isthmus ablation 10/3/2022 and sick sinus syndrome s/p dual chamber pacemaker ([employer] MRI) 10/3/2022    See prior notes for details    Interim Summary: 3/14/2023    11/14/2022 s/p TAVR valve in valve [facility] Rivas Avery overall mentally feeling better and energy level somewhat improved.  He is limited with regards to significant bilateral osteoarthritis of the knees awaiting possible TKA after 1/1/2023.  Acceptable risk for noncardiac surgery.  We will start cardiac rehab and customize accordingly given limitations with his bilateral knees.  No overt chest or chest pressure no shortness of breath, No admissions for heart failure.  He is somewhat deconditioned secondary to limitations with his knees.    He has since undergoing left knee surgery.  Following with ortho today 3/14/2023  As had knee lock up Jose 3/12/2023.  BP low during post op thus will restart losartan at 50 mg once daily today, was on BID    Denies any chest pain or shortness of breath at rest. Denies any dizziness or lightheadedness. Denies any palpitations, skipped beats or fluttering sensation. Denies any PND or orthopnea. Denies being awoken in the middle of the night with chest pain, shortness of breath or chest pain. Appetite is good.  Energy level good.      No GIB (hematemesis, hematochezia, melena), hematuria, epistaxis nor excessive bleeding/bruising.    Body mass index is 40.79 kg/m².     Wife Dorota present      Past Medical History:   Diagnosis Date   • Anemia    • Anxiety state 03/01/2001   • Aortic valve stenosis     s/p Edward's # 25, Magna Ease Bioprosthetic  2/13/2018   • Arthritis    • Benign essential HTN 03/01/2001   • Bifascicular block    • Cardiac tamponade 02/27/2018     s/p Emergent Pericardiocentesis 750 mL 2/27/2018   • Closed fracture of calcaneus 02/25/1994   • Closed fracture of left tibia and fibula with routine healing    • Congestive cardiac failure (CMD)    • COPD (chronic obstructive pulmonary disease) (CMD)    • Esophagitis 04/17/1997   • Gastroesophageal reflux disease    • High cholesterol    • Hyperuricemia    • Metabolic syndrome    • Mild CAD    • Mixed dyslipidemia 04/01/2019   • DESTINY (obstructive sleep apnea)     setting 10, compliant with use   • RBBB    • S/P AVR (aortic valve replacement) 02/13/2018    s/p Edward's # 25, Magna Ease Bioprosthetic 2/13/2018   • S/P cardiac cath 09/06/2022 9/6/2022 s/p Severe AVR AS 0.79 mmHg, mean 54, P2P 71,  Mildly elevated RHC PCW 23 mmHg, LHC with dominant left system minimal CAD, AVR crossed JR4 Straight tipped 035, via RBV, RRA at [facility] Dr. El for AVR - AS   • S/p TAVR (transcatheter aortic valve replacement), bioprosthetic 11/14/2022 11/14/2022 s/p TAVR #29 mm Medtronic Evolut PRO valve in valve GSAM Rivas Avery   • Sick sinus syndrome (CMD)     s/p dual chamber pacemaker ([employer] MRI) 10/3/2022   • Sinus bradycardia    • SOB (shortness of breath)    • Syncope and collapse 2001   • Type 2 diabetes mellitus without complication, without long-term current use of insulin (CMD) 04/02/2019   • Typical atrial flutter (CMD) 09/27/2022    s/p cavotricuspid isthmus ablation 10/3/2022   • Wears reading eyeglasses           Past Surgical History:   Procedure Laterality Date   • Aortic valve replacement  02/13/2018    s/p Edward's # 25, Magna Ease Bioprosthetic 2/13/2018 Dr. Arenas   • Back surgery      spine cyst  surg   • Cardiac catherization  09/07/2011     Angiographically normal coronaries, mild AS mean gradient of 17 mmHg, valve area 1.5 cm sq at [facility] with Dr. Granado   •  Cystourethroscopy  06/30/2022    hematuria, clot evacuation   • Esophagogastroduodenoscopy transoral flex w/bx single or mult  2018    Dr. Streeter, Tripp's esophagus   • Finger amputation Left 1989    index   • Foot/toes surgery proc unlisted Bilateral     big toe surg joint replacement   • Inguinal hernia repair Right 2002   • Laparoscopy, cholecystectomy     • Open access colonoscopy  2018    Dr. Streeter, polyps.  Repeat 5 years   • Pericardiocentesis  02/27/2018    s/p Emergent Pericardiocentesis 750 mL 2/27/2018   • Right and left heart cath  01/24/2018    with Ventricularography, Normal RHC, LHC with dominant left system; unable to cross AV via RBV and RRA at [facility] Dr. El for preop AOV   • Right and left heart cath  07/09/2019 7/9/2019 Mildly elevated RHC PCW 22 mmHg, LHC with dominant left system minimal CAD, AVR not crossed via RBV, RRA at [facility] Dr. El   • Right and left heart cath  09/2020 9/2020 RHC/LHC w/ elevated PCWP and mildly elevated RV and PA pressures at [facility]   • Right and left heart cath  09/06/2022 9/6/2022 s/p Severe AVR AS 0.79 mmHg, mean 54, P2P 71,  Mildly elevated RHC PCW 23 mmHg, LHC with dominant left system minimal CAD, AVR crossed JR4 Straight tipped 035, via RBV, RRA at [facility] Dr. El for AVR - AS   • Right heart catheterization  04/07/2021 4/7/2021 s/p exercise bike hemodynamic evaluation with mild moderately elevated RAP at baseline, RV PA at baseline, mildly elevated PC WP at baseline with normal cardiac output and index at [facility]  Dr. Andres Rai   • Tavr iliofemoral-cv  11/14/2022 11/14/2022 s/p TAVR #29 mm Medtronic Evolut PRO valve in valve GSAM Rivas Avery   • Total knee replacement Left 02/27/2023 2/27/2023 s/p Left total knee arthroplasty Dr. Gupta   • Urolift transprostatic implant procedure  06/2022   • Uvulopalatopharyngoplasty         Allergies:   ALLERGIES:   Allergen Reactions   • Penicillins Other (See Comments)     UNKNOWN REACTION   •  Pravastatin Other (See Comments)     Elevated Liver Enzymes   • Proscar Other (See Comments)     Retrograde ejaculation   • Tamsulosin Other (See Comments)     Retrograde ejacualtion       Medications:   Current Outpatient Medications   Medication Sig Dispense Refill   • celecoxib (CeleBREX) 200 MG capsule Take 200 mg by mouth in the morning and 200 mg in the evening.     • oxyCODONE, IMM REL, (ROXICODONE) 5 MG immediate release tablet Take 5 mg by mouth every 6 hours as needed.     • amLODIPine (NORVASC) 10 MG tablet Take 1 tablet by mouth daily. 90 tablet 3   • losartan (COZAAR) 50 MG tablet Take 1 tablet by mouth 1 time for 1 dose. 180 tablet 3   • zolpidem (AMBIEN) 10 MG tablet Take 1 tablet by mouth nightly as needed for Sleep. 30 tablet 0   • clopidogrel (PLAVIX) 75 MG tablet Take 1 tablet by mouth daily. 90 tablet 1   • metoPROLOL succinate (TOPROL-XL) 50 MG 24 hr tablet Take 1 tablet by mouth daily. 90 tablet 1   • allopurinol (ZYLOPRIM) 300 MG tablet TAKE 1 TABLET BY MOUTH EVERY DAY 90 tablet 0   • carbidopa-levodopa (SINEMET)  MG per tablet      • ezetimibe (ZETIA) 10 MG tablet Take 1 tablet by mouth daily. 90 tablet 1   • metFORMIN (GLUCOPHAGE) 500 MG tablet Take 1 tablet in the morning and 2 tablets in the evening 270 tablet 1   • pantoprazole (PROTONIX) 40 MG tablet Take 1 tablet by mouth daily. 90 tablet 1   • escitalopram (LEXAPRO) 10 MG tablet Take 1 tablet by mouth daily. 90 tablet 1   • glipiZIDE (GLUCOTROL XL) 5 MG 24 hr tablet Take 1 tablet by mouth daily. 90 tablet 1   • HYDROcodone-acetaminophen (NORCO) 5-325 MG per tablet Take 1 tablet by mouth every 6 hours as needed for Pain. 10 tablet 0   • aspirin 81 MG EC tablet Chew 81 mg by mouth daily.     • Spiriva HandiHaler 18 MCG capsule for inhaler PLACE 1 CAPSULE INTO INHALER AND INHALE DAILY. 30 each 5   • furosemide (LASIX) 40 MG tablet Take 1 tablet by mouth daily. 90 tablet 3   • NEL CONTOUR NEXT TEST test strip USE TO TEST THREE  TIMES DAILY 300 strip 2   • Multiple Vitamin (MULTI VITAMIN MENS PO) Take 1 tablet by mouth daily.     • Ferrous Sulfate (IRON) 28 MG Tab Take 28 mg by mouth 2 times daily.     • Magnesium Oxide 400 MG Cap Take 400 mg by mouth 2 times daily. 30 capsule      No current facility-administered medications for this visit.       Social History:   Social History     Socioeconomic History   • Marital status: /Civil Union     Spouse name: Not on file   • Number of children: Not on file   • Years of education: Not on file   • Highest education level: Not on file   Occupational History   • Occupation:      Employer: [employer]   Tobacco Use   • Smoking status: Never   • Smokeless tobacco: Never   Vaping Use   • Vaping Use: never used   Substance and Sexual Activity   • Alcohol use: Yes     Alcohol/week: 14.0 standard drinks     Types: 14 Cans of beer per week     Comment: 3-4 beers a day   • Drug use: No   • Sexual activity: Not on file   Other Topics Concern   • Not on file   Social History Narrative    PMHX:    # Anxiety with insomnia. Unable to \"turn it off\" at night. Improved on sertraline per his report 5/4/2017- which was started 3/2017    # Hypertension with aortic stenosis and a right bundle-branch block. Because of aortic stenosis will have to use diuretics with caution.      Echocardiogram March 2017 showed aortic valve area 0.99 cm. Sept 7, 2011 Angiographically normal coronaries, mild AS mean gradient of 17 mmHg, valve area 1.5 cm sq at [facility]    # Fasting hyperglycemia. Hemoglobin A1c of 6.2 3/2017.     # Elevated transaminases.  A Most likely on the base of fatty liver, which was confirmed by biopsy 8/2016. He has never had a transfusion. CT scan of his abdomen June 2012 did show changes consistent with fatty liver. He has never had hepatitis. Hepatitis and iron studies March 2013 were negative.     # Thrombocytopenia. This may be related to alcohol. Using more alcohol and platelets at 133K 3/2017    #  Hyperlipidemia. Placed on a statin in 8/2014 that was stopped due to elevated liver enzymes.     # Benign prostatic hypertrophy, with nocturia. Intolerant to Flomax and oxybutynin. Daily Cialis was not effective. He has discontinued finasteride, citing retrograde ejaculation. Seen by Urology.    # History of an epididymal cyst seen by Urology.    # Erectile dysfunction with  with a normal testosterone fall 2015.    # Sleep apnea with intermittent insomnia. Pulmonology consult order placed 3/16/2017.    # History of diverticulitis.    # Chronic esophagitis on a proton pump inhibitor.    # History of mild anxiety and insomnia in the past. He has Ambien, but has not used it recently.         # Surgical HX:    lumbar laminectomy,    traumatic amputation 2nd digit left hand benign masses removed from his finger right inguinal hernia repair,     partial joint replacement of the 1st digit right foot and other surgeries on the right foot,     laparoscopic cholecystectomy.    · UVULOPALATOPHARYGOPLASTY      · AORTIC VALVE REPLACEMENT 02/13/2018 s/p Edward's # 25, Magna Ease Bioprosthetic 2/13/2018 Dr. Arenas     · CARDIAC CATHERIZATION Sept 7, 2011 Angiographically normal coronaries, mild AS mean gradient of 17 mmHg, valve area 1.5 cm sq at [facility] with Dr. Granado     · PC-ULTRASONIC GUIDE FOR PERICARDIOCENTESIS, IMAGING SPVSN/INTERP 02/27/2018 s/p Emergent Pericardiocentesis 750 mL 2/27/2018     · RIGHT AND LEFT HEART CATH W/VENTRICULOGRAPHY 01/24/2018 Normal RHC, LHC with dominant left system; unable to cross AV via RBV and RRA at [facility] Dr. El for preop AOV      Social Determinants of Health     Financial Resource Strain: Not on file   Food Insecurity: Not on file   Transportation Needs: Not on file   Physical Activity: Inactive   • Days of Exercise per Week: 0 days   • Minutes of Exercise per Session: 0 min   Stress: Not on file   Social Connections: Not on file   Intimate Partner Violence: Not At Risk   • Social  Determinants: Intimate Partner Violence Past Fear: No   • Social Determinants: Intimate Partner Violence Current Fear: No       Family History:   Family History   Problem Relation Age of Onset   • Heart Mother          of MI   • Hypertension Father    • Parkinsonism Father    • Heart disease Sister         half sister   • Patient is unaware of any medical problems Maternal Grandmother    • Patient is unaware of any medical problems Maternal Grandfather    • Patient is unaware of any medical problems Paternal Grandmother    • Patient is unaware of any medical problems Paternal Grandfather        ROS  Review of Systems   Constitutional: Negative.    HENT: Negative.    Eyes: Negative.    Respiratory: Negative.    Cardiovascular: Negative.    Gastrointestinal: Negative.    Endocrine: Negative.    Genitourinary: Negative.    Musculoskeletal: Positive for arthralgias.   Skin: Negative.    Allergic/Immunologic: Negative.    Neurological: Negative.    Hematological: Negative.    Psychiatric/Behavioral: Negative.        OBJECTIVE:  Visit Vitals  /64   Pulse 72   Resp 18   Ht 5' 9\" (1.753 m)   Wt 125.3 kg (276 lb 3.2 oz)   SpO2 96%   BMI 40.79 kg/m²       Physical Exam  Physical Exam  Constitutional:       Appearance: He is well-developed.   HENT:      Head: Normocephalic and atraumatic.   Eyes:      Conjunctiva/sclera: Conjunctivae normal.      Pupils: Pupils are equal, round, and reactive to light.   Cardiovascular:      Rate and Rhythm: Normal rate and regular rhythm.      Pulses: Normal pulses.           Carotid pulses are 2+ on the right side and 2+ on the left side.       Radial pulses are 2+ on the right side and 2+ on the left side.        Dorsalis pedis pulses are 2+ on the right side and 2+ on the left side.      Heart sounds: S1 normal and S2 normal. Murmur heard.    Systolic murmur is present with a grade of 1/6.     Comments: Radial Lobo's test intact  Well healed midline scar  And left chest wall  scar  Using walked to ambulate  Left knee dressing in place  Pulmonary:      Effort: Pulmonary effort is normal.      Breath sounds: Normal breath sounds.   Abdominal:      General: Bowel sounds are normal.      Palpations: Abdomen is soft.      Comments: obese   Musculoskeletal:         General: Normal range of motion.      Cervical back: Normal range of motion and neck supple.      Right lower leg: No edema.      Left lower leg: No edema.   Skin:     General: Skin is warm and dry.   Neurological:      Mental Status: He is alert and oriented to person, place, and time.         Lab / Testing:   The following labs and diagnostic studies were reviewed by me independently and discussed with the patient. Refer to individual report(s) for complete details.    Lab Services on 02/20/2023   Component Date Value Ref Range Status   • Acetylcholine Modulating Antibody 02/20/2023 0  <=45 % Final    Comment: INTERPRETIVE INFORMATION: Acetylcholine Modulating Ab      Negative ..........  0-45 percent modulating    Positive ..........  46 percent or greater modulating    Approximately 85-90 percent of patients with myasthenia gravis   (MG) express antibodies to the acetylcholine receptor (AChR),   which can be divided into binding, blocking, and modulating   antibodies. Binding antibody can activate complement and lead to   loss of AChR. Blocking antibody may impair binding of   acetylcholine to the receptor, leading to poor muscle contraction.   Modulating antibody causes receptor endocytosis resulting in loss   of AChR expression, which correlates most closely with clinical   severity of disease. Approximately 10-15 percent of individuals   with confirmed myasthenia gravis have no measurable binding,   blocking, or modulating antibodies.    This test was developed and its performance characteristics   determined by [employer]. It has not been cleared or   approved by the [place] Food and                            Drug  Administration. This test was   performed in a CLIA certified laboratory and is intended for   clinical purposes.  Performed By: [facility]  [address] [place] [id number]  : Kayden Brown MD, PhD   • CK 02/20/2023 60  39 - 308 Units/L Final   • Folate 02/20/2023 23.6  >=5.5 ng/mL Final   • TSH 02/20/2023 2.957  0.350 - 5.000 mcUnits/mL Final    Findings most consistent with euthyroid state, no additional testing suggested. TSH may be normal in patients with thyroid dysfunction and pituitary disease. Clinical correlation recommended.    (Reflex TSH algorithm is not recommended in hospitalized patients. A variety of drugs, as well as serious acute and chronic illnesses may alter thyroid function tests. Commonly implicated drugs include glucocorticoids, dopamine, carbamazepine, iodine, amiodarone, lithium and heparin.)   • Vitamin B12 02/20/2023 417  211 - 911 pg/mL Final   • ENDER Screen With Antibody And IFA R* 02/20/2023 Negative  Negative Final    Specimen is negative by multiplex immunoassay for the following antinuclear antibodies: dsDNA, Chromatin, Ribosomal P, SSA, SSB, Sm, Sm/RNP, RNP, Scl-70, Megan-1 and Centromere.   • Acetylcholine Blocking Antibody 02/20/2023 11  0 - 26 % Final    Comment: INTERPRETIVE INFORMATION: Acetylcholine Blocking Ab      Negative ............  0-26 percent blocking    Indeterminate ....... 27-41 percent blocking    Positive ............ 42 percent or greater blocking    Approximately 85-90 percent of patients with myasthenia gravis   (MG) express antibodies to the acetylcholine receptor (AChR),   which can be divided into binding, blocking, and modulating   antibodies. Binding antibody can activate complement and lead to   loss of AChR. Blocking antibody may impair binding of   acetylcholine to the receptor, leading to poor muscle contraction.   Modulating antibody causes receptor endocytosis resulting in loss   of AChR expression, which  correlates most closely with clinical   severity of disease. Approximately 10-15 percent of individuals   with confirmed myasthenia gravis have no measurable binding,   blocking, or modulating antibodies.    This test was developed and its performance characteristics   determined by [facility]. It has not                            been cleared or   approved by the US Food and Drug Administration. This test was   performed in a CLIA certified laboratory and is intended for   clinical purposes.  Performed By: [facility]  [address] [place] [id number]  : Kayden Brown MD, PhD   Lab Services on 02/20/2023   Component Date Value Ref Range Status   • Acetylcholine Binding Antibody 02/20/2023 0.0   Final   • Muscle-Specific Kinase(Musk) Ab, I* 02/20/2023 0.00   Final   Lab Services on 02/13/2023   Component Date Value Ref Range Status   • Sodium 02/13/2023 133 (A)  135 - 145 mmol/L Final   • Potassium 02/13/2023 4.7  3.4 - 5.1 mmol/L Final   • Chloride 02/13/2023 96 (A)  97 - 110 mmol/L Final   • Carbon Dioxide 02/13/2023 28  21 - 32 mmol/L Final   • Anion Gap 02/13/2023 14  7 - 19 mmol/L Final   • Glucose 02/13/2023 150 (A)  70 - 99 mg/dL Final   • BUN 02/13/2023 23 (A)  6 - 20 mg/dL Final   • Creatinine 02/13/2023 0.96  0.67 - 1.17 mg/dL Final   • Glomerular Filtration Rate 02/13/2023 86  >=60 Final    eGFR results = or >60 mL/min/1.73m2 = Normal kidney function. Estimated GFR calculated using the CKD-EPI-R (2021) equation that does not include race in the creatinine calculation.   • BUN/Cr 02/13/2023 24  7 - 25 Final   • Calcium 02/13/2023 9.5  8.4 - 10.2 mg/dL Final   • Bilirubin, Total 02/13/2023 0.6  0.2 - 1.0 mg/dL Final   • GOT/AST 02/13/2023 102 (A)  <=37 Units/L Final   • GPT/ALT 02/13/2023 142 (A)  <64 Units/L Final   • Alkaline Phosphatase 02/13/2023 90  45 - 117 Units/L Final   • Albumin 02/13/2023 3.8  3.6 - 5.1 g/dL Final   • Protein, Total 02/13/2023 6.8   6.4 - 8.2 g/dL Final   • Globulin 02/13/2023 3.0  2.0 - 4.0 g/dL Final   • A/G Ratio 02/13/2023 1.3  1.0 - 2.4 Final   • WBC 02/13/2023 3.3 (A)  4.2 - 11.0 K/mcL Final   • RBC 02/13/2023 4.09 (A)  4.50 - 5.90 mil/mcL Final   • HGB 02/13/2023 12.0 (A)  13.0 - 17.0 g/dL Final   • HCT 02/13/2023 36.3 (A)  39.0 - 51.0 % Final   • MCV 02/13/2023 88.8  78.0 - 100.0 fl Final   • MCH 02/13/2023 29.3  26.0 - 34.0 pg Final   • MCHC 02/13/2023 33.1  32.0 - 36.5 g/dL Final   • RDW-CV 02/13/2023 14.4  11.0 - 15.0 % Final   • RDW-SD 02/13/2023 46.2  39.0 - 50.0 fL Final   • PLT 02/13/2023 160  140 - 450 K/mcL Final   • NRBC 02/13/2023 0  <=0 /100 WBC Final   • Neutrophil, Percent 02/13/2023 40  % Final   • Lymphocytes, Percent 02/13/2023 26  % Final   • Mono, Percent 02/13/2023 22  % Final   • Eosinophils, Percent 02/13/2023 5  % Final   • Basophils, Percent 02/13/2023 1  % Final   • Bands, Percent 02/13/2023 3  0 - 10 % Final   • Reactive Lymphocytes, Percent 02/13/2023 3  0 - 5 % Final   • Absolute Neutrophil 02/13/2023 1.4 (A)  1.8 - 7.7 K/mcL Final   • Absolute Lymphocytes 02/13/2023 1.0  1.0 - 4.0 K/mcL Final   • Absolute Monocytes 02/13/2023 0.7  0.3 - 0.9 K/mcL Final   • Absolute Eosinophils 02/13/2023 0.2  0.0 - 0.5 K/mcL Final   • Absolute Basophils 02/13/2023 0.0  0.0 - 0.3 K/mcL Final   • Hypochromia 02/13/2023 Few   Final   • Macrocytosis 02/13/2023 Moderate   Final   • Large Platelets 02/13/2023 Present   Final   Ancillary Procedure on 12/12/2022   Component Date Value Ref Range Status   • AV Stenosis Severity Text 12/12/2022 Absent   Corrected   • Aortic Valve Area 12/12/2022 1.94   Corrected   • AV Peak Gradient 12/12/2022 22.00   Corrected   • AV Mean Gradient 12/12/2022 7.00   Corrected   • AV Peak Velocity 12/12/2022 2.34   Corrected   • AV Mean Velocity 12/12/2022 1.67   Corrected   • Ejection Fraction 12/12/2022 61%   Corrected   • AV VTI (Previously displayed as AV* 12/12/2022 1.31   Corrected   • MV Peak E  Velocity 12/12/2022 1.06   Corrected   • MV Peak A Velocity 12/12/2022 261   Corrected   • E Wave Decelaration Time 12/12/2022 [id number]   Corrected   • MV E Wave Serge/E Tissue Serge Med 12/12/2022 6.22   Corrected   • MV E Tissue Serge Med 12/12/2022 8.33   Corrected   • TV Estimated Right Arterial Pressu* 12/12/2022 11.2   Corrected   • RV End Systolic Longitudinal Strai* 12/12/2022 2.3   Corrected   • LV outflow tract 12/12/2022 26.8   Corrected   • LVOT VTI 12/12/2022 1.00   Corrected   • LAURI LVOT Peak Gradient 12/12/2022 1.1   Corrected   • LV end diastolic posterior wall th* 12/12/2022 5.2   Corrected   • Left Ventricular Internal Dimensio* 12/12/2022 3.0   Corrected   • Left Internal Dimenson in Systole 12/12/2022 1.3   Corrected   • Interventricular Septum in End Donna* 12/12/2022 2.29   Corrected   Office Visit on 12/03/2022   Component Date Value Ref Range Status   • Uric Acid 12/03/2022 5.0  3.5 - 7.2 mg/dL Final   • Microalbumin, Urine 12/03/2022 3.21  mg/dL Final   • Creatinine, Urine 12/03/2022 100.25  mg/dL Final   • Microalbumin/ Creatinine Ratio 12/03/2022 32.0 (A)  <30.0 mg/g Final    Short term hyperglycemia, exercise, urinary tract infections, marked hypertension, heart failure, and acute febrile illness can cause transient elevations in urinary albumin excretion. Due to marked day-to-day variability in albumin excretion, at least two of the three collections done in a 3 to 6 month period should show elevated levels before initially designating a patient as having microalbuminuria.   • Prostate Specific Antigen 12/03/2022 0.76  <=4.50 ng/mL Final    [employer] Chemiluminescence. Results obtained with different assay methods or kits cannot be used interchangeably.     • Hemoglobin A1C 12/03/2022 6.5 (A)  4.5 - 5.6 % Final      Diabetic Screening  Non Diabetic:             <5.7%  Increased Risk:           5.7-6.4%  Diagnostic For Diabetes:  >6.4%    Diabetic Control  A1C%       eAG mg/dL  6.0             126  6.5            140  7.0            154  7.5            169  8.0            183  8.5            197  9.0            212  9.5            226  10.0           240   • Cholesterol 12/03/2022 227 (A)  <=199 mg/dL Final    Desirable         <200  Borderline High   200 to 239  High              >=240   • Triglycerides 12/03/2022 320 (A)  <=149 mg/dL Final    Normal            <150  Borderline High   150 to 199  High              200 to 499  Very High         >=500   • HDL 12/03/2022 36 (A)  >=40 mg/dL Final    Low              <40  Borderline Low   40 to 49  Near Optimal     50 to 59  Optimal          >=60   • LDL 12/03/2022 127  <=129 mg/dL Final    OPTIMAL           <100  NEAR OPTIMAL      100 to 129  BORDERLINE HIGH   130 to 159  HIGH              160 to 189  VERY HIGH         >=190   • Non-HDL Cholesterol 12/03/2022 191  mg/dL Final    Therapeutic Target:  CHD and risk equivalents  <130  Multiple risk factors     <160  0 to 1 risk factor        <190   • Cholesterol/ HDL Ratio 12/03/2022 6.3 (A)  <=4.4 Final   • Sodium 12/03/2022 138  135 - 145 mmol/L Final   • Potassium 12/03/2022 4.7  3.4 - 5.1 mmol/L Final   • Chloride 12/03/2022 101  97 - 110 mmol/L Final   • Carbon Dioxide 12/03/2022 28  21 - 32 mmol/L Final   • Anion Gap 12/03/2022 14  7 - 19 mmol/L Final   • Glucose 12/03/2022 129 (A)  70 - 99 mg/dL Final   • BUN 12/03/2022 27 (A)  6 - 20 mg/dL Final   • Creatinine 12/03/2022 1.11  0.67 - 1.17 mg/dL Final   • Glomerular Filtration Rate 12/03/2022 72  >=60 Final    eGFR results = or >60 mL/min/1.73m2 = Normal kidney function. Estimated GFR calculated using the CKD-EPI-R (2021) equation that does not include race in the creatinine calculation.   • BUN/Cr 12/03/2022 24  7 - 25 Final   • Calcium 12/03/2022 9.1  8.4 - 10.2 mg/dL Final   • Bilirubin, Total 12/03/2022 0.4  0.2 - 1.0 mg/dL Final   • GOT/AST 12/03/2022 70 (A)  <=37 Units/L Final   • GPT/ALT 12/03/2022 86 (A)  <64 Units/L Final   • Alkaline  Phosphatase 12/03/2022 93  45 - 117 Units/L Final   • Albumin 12/03/2022 3.6  3.6 - 5.1 g/dL Final   • Protein, Total 12/03/2022 7.2  6.4 - 8.2 g/dL Final   • Globulin 12/03/2022 3.6  2.0 - 4.0 g/dL Final   • A/G Ratio 12/03/2022 1.0  1.0 - 2.4 Final   • WBC 12/03/2022 4.2  4.2 - 11.0 K/mcL Final   • RBC 12/03/2022 4.06 (A)  4.50 - 5.90 mil/mcL Final   • HGB 12/03/2022 11.8 (A)  13.0 - 17.0 g/dL Final   • HCT 12/03/2022 35.5 (A)  39.0 - 51.0 % Final   • MCV 12/03/2022 87.4  78.0 - 100.0 fl Final   • MCH 12/03/2022 29.1  26.0 - 34.0 pg Final   • MCHC 12/03/2022 33.2  32.0 - 36.5 g/dL Final   • RDW-CV 12/03/2022 15.6 (A)  11.0 - 15.0 % Final   • RDW-SD 12/03/2022 49.1  39.0 - 50.0 fL Final   • PLT 12/03/2022 207  140 - 450 K/mcL Final   • NRBC 12/03/2022 0  <=0 /100 WBC Final   • Neutrophil, Percent 12/03/2022 57  % Final   • Lymphocytes, Percent 12/03/2022 24  % Final   • Mono, Percent 12/03/2022 14  % Final   • Eosinophils, Percent 12/03/2022 3  % Final   • Basophils, Percent 12/03/2022 1  % Final   • Immature Granulocytes 12/03/2022 1  % Final   • Absolute Neutrophils 12/03/2022 2.4  1.8 - 7.7 K/mcL Final   • Absolute Lymphocytes 12/03/2022 1.0  1.0 - 4.0 K/mcL Final   • Absolute Monocytes 12/03/2022 0.6  0.3 - 0.9 K/mcL Final   • Absolute Eosinophils  12/03/2022 0.1  0.0 - 0.5 K/mcL Final   • Absolute Basophils 12/03/2022 0.0  0.0 - 0.3 K/mcL Final   • Absolute Immature Granulocytes 12/03/2022 0.1  0.0 - 0.2 K/mcL Final   Admission on 11/14/2022, Discharged on 11/16/2022   Component Date Value Ref Range Status   • Ventricular Rate EKG/Min (BPM) 11/14/2022 70   Final   • Atrial Rate (BPM) 11/14/2022 70   Final   • MA-Interval (MSEC) 11/14/2022 156   Final   • QRS-Interval (MSEC) 11/14/2022 168   Final   • QT-Interval (MSEC) 11/14/2022 470   Final   • QTc 11/14/2022 507   Final   • P Axis (Degrees) 11/14/2022 64   Final   • R Axis (Degrees) 11/14/2022 -85   Final   • T Axis (Degrees) 11/14/2022 46   Final   • REPORT  TEXT 11/14/2022    Final                    Value:Normal sinus rhythm  Right bundle branch block  Left anterior fascicular block   Bifascicular block  Abnormal ECG  When compared with ECG of  27-SEP-2022 10:07,  Sinus rhythm  has replaced  Atrial flutter  Criteria for  Inferior infarct  are no longer  present  Confirmed by MARTHA GONZALEZ MD [id number]) on 11/14/2022 10:32:14 AM     • UNIT BLOOD TYPE 11/14/2022 A Pos   Final   • ISBT BLOOD TYPE 11/14/2022 6200   Final   • BLOOD EXPIRATION DATE 11/14/2022 20221130235900   Final   • UNIT NUMBER 11/14/2022 [id number]   Final   • DISPENSE STATUS 11/14/2022 Returned from Issue   Final   • PRODUCT ID 11/14/2022 Red Blood Cells   Final   • PRODUCT CODE 11/14/2022 [id number]   Final   • PRODUCT DESCRIPTION 11/14/2022 RBC AS-1 LR   Final   • UNIT BLOOD TYPE 11/14/2022 A Pos   Final   • ISBT BLOOD TYPE 11/14/2022 6200   Final   • BLOOD EXPIRATION DATE 11/14/2022 20221130235900   Final   • UNIT NUMBER 11/14/2022 [id number]   Final   • DISPENSE STATUS 11/14/2022 Returned from Issue   Final   • PRODUCT ID 11/14/2022 Red Blood Cells   Final   • PRODUCT CODE 11/14/2022 [id number]   Final   • PRODUCT DESCRIPTION 11/14/2022 RBC AS-1 LR   Final   • CROSSMATCH RESULT 11/14/2022 Compatible   Final   • CROSSMATCH RESULT 11/14/2022 Compatible   Final   • ISSUE DATE/TIME 11/14/2022 [id number]   Final   • ISSUE DATE/TIME 11/14/2022 [id number]   Final   • GLUCOSE, BEDSIDE - POINT OF CARE 11/14/2022 134 (A)  70 - 99 mg/dL Final   • Activated Clotting Time 11/14/2022 294  Baseline/Target ranges are set by clinicians for each patient/procedure Final   • Activated Clotting Time 11/14/2022 298  Baseline/Target ranges are set by clinicians for each patient/procedure Corrected   • GLUCOSE, BEDSIDE - POINT OF CARE 11/14/2022 130 (A)  70 - 99 mg/dL Final    Notified RN  Capillary Sample   • Aortic Valve Area 11/14/2022 2.22   Corrected   • AV Peak Gradient 11/14/2022 36.00   Corrected   • AV Mean  Gradient 11/14/2022 16.00   Corrected   • Ejection Fraction 11/14/2022 60%   Corrected   • RV End Systolic Longitudinal Strai* 11/14/2022 2.7   Corrected   • LV outflow tract 11/14/2022 24.7   Corrected   • LVOT VTI 11/14/2022 1.12   Corrected   • Magnesium 11/15/2022 1.7  1.7 - 2.4 mg/dL Final   • GLUCOSE, BEDSIDE - POINT OF CARE 11/14/2022 93  70 - 99 mg/dL Final    Capillary Sample   • Extra Tube 11/15/2022 Hold for Add Ons   Final   • Ventricular Rate EKG/Min (BPM) 11/16/2022 60   Final   • Atrial Rate (BPM) 11/16/2022 60   Final   • SC-Interval (MSEC) 11/16/2022 146   Final   • QRS-Interval (MSEC) 11/16/2022 174   Final   • QT-Interval (MSEC) 11/16/2022 452   Final   • QTc 11/16/2022 452   Final   • P Axis (Degrees) 11/16/2022 19   Final   • R Axis (Degrees) 11/16/2022 -82   Final   • T Axis (Degrees) 11/16/2022 34   Final   • REPORT TEXT 11/16/2022    Final                    Value:Atrial-paced rhythm  with prolonged AV conduction  Left axis deviation  Left anterior fascicular block  Right bundle branch block  Abnormal ECG  When compared with ECG of  14-NOV-2022 10:05,  Electronic atrial pacemaker  has replaced  Sinus rhythm  Confirmed by LOUISE WEEMS, TANIA ([id number]) on 11/16/2022 10:03:33 AM     • Sodium 11/15/2022 138  135 - 145 mmol/L Final   • Potassium 11/15/2022 4.8  3.4 - 5.1 mmol/L Final   • Chloride 11/15/2022 105  97 - 110 mmol/L Final   • Carbon Dioxide 11/15/2022 27  21 - 32 mmol/L Final   • Anion Gap 11/15/2022 11  7 - 19 mmol/L Final   • Glucose 11/15/2022 118 (A)  70 - 99 mg/dL Final   • BUN 11/15/2022 22 (A)  6 - 20 mg/dL Final   • Creatinine 11/15/2022 1.31 (A)  0.67 - 1.17 mg/dL Final   • Glomerular Filtration Rate 11/15/2022 59 (A)  >=60 Final    eGFR 30-59 mL/min/1.73m2 = Moderate decrease in kidney function. Stage 3 CKD (chronic kidney disease) or moderate kidney disease. Estimated GFR calculated using the CKD-EPI-R (2021) equation that does not include race in the creatinine calculation.   •  BUN/Cr 11/15/2022 17  7 - 25 Final   • Calcium 11/15/2022 8.6  8.4 - 10.2 mg/dL Final   • WBC 11/15/2022 5.5  4.2 - 11.0 K/mcL Final   • RBC 11/15/2022 3.48 (A)  4.50 - 5.90 mil/mcL Final   • HGB 11/15/2022 10.1 (A)  13.0 - 17.0 g/dL Final   • HCT 11/15/2022 31.4 (A)  39.0 - 51.0 % Final   • MCV 11/15/2022 90.2  78.0 - 100.0 fl Final   • MCH 11/15/2022 29.0  26.0 - 34.0 pg Final   • MCHC 11/15/2022 32.2  32.0 - 36.5 g/dL Final   • PLT 11/15/2022 149  140 - 450 K/mcL Final   • RDW-CV 11/15/2022 16.6 (A)  11.0 - 15.0 % Final   • RDW-SD 11/15/2022 54.1 (A)  39.0 - 50.0 fL Final   • NRBC 11/15/2022 0  <=0 /100 WBC Final   • GLUCOSE, BEDSIDE - POINT OF CARE 11/14/2022 116 (A)  70 - 99 mg/dL Final   • GLUCOSE, BEDSIDE - POINT OF CARE 11/15/2022 123 (A)  70 - 99 mg/dL Final    Capillary Sample   • GLUCOSE, BEDSIDE - POINT OF CARE 11/15/2022 150 (A)  70 - 99 mg/dL Final   • Sodium 11/16/2022 137  135 - 145 mmol/L Final   • Potassium 11/16/2022 4.2  3.4 - 5.1 mmol/L Final   • Chloride 11/16/2022 105  97 - 110 mmol/L Final   • Carbon Dioxide 11/16/2022 27  21 - 32 mmol/L Final   • Anion Gap 11/16/2022 9  7 - 19 mmol/L Final   • Glucose 11/16/2022 127 (A)  70 - 99 mg/dL Final   • BUN 11/16/2022 29 (A)  6 - 20 mg/dL Final   • Creatinine 11/16/2022 1.10  0.67 - 1.17 mg/dL Final   • Glomerular Filtration Rate 11/16/2022 73  >=60 Final    eGFR results = or >60 mL/min/1.73m2 = Normal kidney function. Estimated GFR calculated using the CKD-EPI-R (2021) equation that does not include race in the creatinine calculation.   • BUN/Cr 11/16/2022 26 (A)  7 - 25 Final   • Calcium 11/16/2022 8.7  8.4 - 10.2 mg/dL Final   • WBC 11/16/2022 4.1 (A)  4.2 - 11.0 K/mcL Final   • RBC 11/16/2022 3.61 (A)  4.50 - 5.90 mil/mcL Final   • HGB 11/16/2022 10.2 (A)  13.0 - 17.0 g/dL Final   • HCT 11/16/2022 32.2 (A)  39.0 - 51.0 % Final   • MCV 11/16/2022 89.2  78.0 - 100.0 fl Final   • MCH 11/16/2022 28.3  26.0 - 34.0 pg Final   • MCHC 11/16/2022 31.7 (A)   32.0 - 36.5 g/dL Final   • RDW-CV 11/16/2022 16.0 (A)  11.0 - 15.0 % Final   • RDW-SD 11/16/2022 52.5 (A)  39.0 - 50.0 fL Final   • PLT 11/16/2022 113 (A)  140 - 450 K/mcL Final   • NRBC 11/16/2022 0  <=0 /100 WBC Final   • Neutrophil, Percent 11/16/2022 52  % Final   • Lymphocytes, Percent 11/16/2022 20  % Final   • Mono, Percent 11/16/2022 23  % Final   • Eosinophils, Percent 11/16/2022 3  % Final   • Basophils, Percent 11/16/2022 1  % Final   • Immature Granulocytes 11/16/2022 1  % Final   • Absolute Neutrophils 11/16/2022 2.2  1.8 - 7.7 K/mcL Final   • Absolute Lymphocytes 11/16/2022 0.8 (A)  1.0 - 4.0 K/mcL Final   • Absolute Monocytes 11/16/2022 0.9  0.3 - 0.9 K/mcL Final   • Absolute Eosinophils  11/16/2022 0.1  0.0 - 0.5 K/mcL Final   • Absolute Basophils 11/16/2022 0.0  0.0 - 0.3 K/mcL Final   • Absolute Immature Granulocytes 11/16/2022 0.0  0.0 - 0.2 K/mcL Final   • GLUCOSE, BEDSIDE - POINT OF CARE 11/15/2022 135 (A)  70 - 99 mg/dL Final   • GLUCOSE, BEDSIDE - POINT OF CARE 11/16/2022 136 (A)  70 - 99 mg/dL Final   Hospital Outpatient Visit on 11/14/2022   Component Date Value Ref Range Status   • Aortic Valve Area 11/14/2022 2.68   Corrected   • AV Peak Gradient 11/14/2022 30.00   Corrected   • AV Mean Gradient 11/14/2022 9.00   Corrected   • Ejection Fraction 11/14/2022 %   Corrected   • RV End Systolic Longitudinal Strai* 11/14/2022 2.5   Corrected   • LV outflow tract 11/14/2022 31.2   Corrected   • LVOT VTI 11/14/2022 1.32   Corrected   Lab Services on 11/11/2022   Component Date Value Ref Range Status   • SARS-CoV-2 by PCR 11/11/2022 Not Detected  Not Detected / Detected / Inhibitor Present Final   • Isolation Guidelines 11/11/2022    Final                    Value:This result contains rich text formatting which cannot be displayed here.   • Procedural Notes 11/11/2022    Final                    Value:This result contains rich text formatting which cannot be displayed here.   Lab Services on  11/11/2022   Component Date Value Ref Range Status   • Sodium 11/11/2022 132 (A)  135 - 145 mmol/L Final   • Potassium 11/11/2022 4.4  3.4 - 5.1 mmol/L Final   • Chloride 11/11/2022 97  97 - 110 mmol/L Final   • Carbon Dioxide 11/11/2022 28  21 - 32 mmol/L Final   • Anion Gap 11/11/2022 11  7 - 19 mmol/L Final   • Glucose 11/11/2022 137 (A)  70 - 99 mg/dL Final   • BUN 11/11/2022 19  6 - 20 mg/dL Final   • Creatinine 11/11/2022 0.94  0.67 - 1.17 mg/dL Final   • Glomerular Filtration Rate 11/11/2022 88  >=60 Final    eGFR results = or >60 mL/min/1.73m2 = Normal kidney function. Estimated GFR calculated using the CKD-EPI-R (2021) equation that does not include race in the creatinine calculation.   • BUN/Cr 11/11/2022 20  7 - 25 Final   • Calcium 11/11/2022 9.2  8.4 - 10.2 mg/dL Final   • ABO/RH(D) 11/11/2022 A Rh Positive   Final   • ANTIBODY SCREEN 11/11/2022 Negative   Final   • TYPE AND SCREEN EXPIRATION DATE 11/11/2022 12/02/2022 23:59   Final   • WBC 11/11/2022 3.7 (A)  4.2 - 11.0 K/mcL Final   • RBC 11/11/2022 3.98 (A)  4.50 - 5.90 mil/mcL Final   • HGB 11/11/2022 11.1 (A)  13.0 - 17.0 g/dL Final   • HCT 11/11/2022 34.1 (A)  39.0 - 51.0 % Final   • MCV 11/11/2022 85.7  78.0 - 100.0 fl Final   • MCH 11/11/2022 27.9  26.0 - 34.0 pg Final   • MCHC 11/11/2022 32.6  32.0 - 36.5 g/dL Final   • PLT 11/11/2022 140  140 - 450 K/mcL Final   • RDW-CV 11/11/2022 15.9 (A)  11.0 - 15.0 % Final   • RDW-SD 11/11/2022 49.9  39.0 - 50.0 fL Final   • NRBC 11/11/2022 0  <=0 /100 WBC Final   Orders Only on 10/03/2022   Component Date Value Ref Range Status   • BEDSIDE GLUCOSE 10/03/2022 126 (A)  70 - 110 mg/dL Final   There may be more visits with results that are not included.       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 Left ventricle: The cavity size is normal. The ejection fraction was     measured by visual estimation. The ejection fraction is 60%.  2. Aortic valve: There is a CoreValve/Evolut R valve, 29 mm transcatheter     valve that is well seated in the aortic valve position. The mean aortic     gradient is 16mmHg. There is no perivalular leak.  3. Left atrium: The atrium is dilated.  4. Right ventricle: Pacemaker lead noted in right ventricle.  5. Pericardium, extracardiac: There is no pericardial effusion.     ECHO 8/25/2022 (independently interpreted and reviewed personally)  1. Left ventricle: The cavity size is normal. Wall thickness is moderately     increased. There is concentric hypertrophy. The ejection fraction was     measured by biplane method of disks. The ejection fraction is 60%.  2. Aortic valve: Not well visualized. A 25mm bioprosthetic valve is present.     Velocity is increased, due to stenosis. There is severe stenosis. The mean     systolic gradient is 57mm Hg.  3. Left atrium: The atrium is mildly dilated.  4. Right ventricle: The cavity size is dilated. Wall thickness is normal.     Systolic function is normal.  5. Pulmonary arteries: Systolic pressure is indeterminate due to the absence     of tricuspid regurgitation.  IMPRESSIONS:  There has been a significant increase and change in Aortic valve  gradients when compared to previous studies: 09/21/21 peak= 31mmHg/ mean=  15mmHg, 04/22/19 peak= 41 Mean=19mmHg.    ECHO 9/4/2020 (independently interpreted and reviewed personally)  1. Left ventricle: The cavity size is normal. Wall thickness is  mildly     increased. Systolic function is mildly reduced. The estimated ejection     fraction is 50-55%, by visual assessment.  2. Left atrium: The atrium is moderately dilated.  3. Right ventricle: The cavity size is moderately dilated.    CANDIDA 7/9/2019   Normally functioning bioprosthetic valve in aortic position.    Structurally normal mitral valve.    Mild mitral regurgitation.    Tricuspid valve is structurally normal.    Mild tricuspid regurgitation.    Normal left ventricular size and systolic function. EF 55%    No thrombus was visualized within the left atrium or atrial    appendage.    Agitated saline contrast study is negative.    Inter-atrial septum is intact.      [facility] 4/7/2021 (independently interpreted and reviewed personally)  CONCLUSIONS:  - Exercise bike hemodynamic evaluation for dyspnea on exertion  - Hypertensive response to exercise  - Mild-Moderately elevated RA pressure at baseline  - Mildly-moderately elevated RV and PA pressures at baseline  - Mildly elevated PCWP at baseline  - Normal cardiac output and cardiac index by montserrat method, uriah output by TD  Mixed venous oxygen saturation 67%  - Significant increase in pulmonary artery and pulmonary capillary wedge pressures consistent with diastolic dysfunction and limited reserve  - Normal systemic vascular resistance  - Mildly elevated pulmonary vascular resistance     RECOMMENDATIONS:  - Treatment will focus on BP control, weight loss and exercise  - Transfer to procedure recovery  - Complete bed-rest for 2 hours post sheath removal  - D/C once period of bedrest has completed    CPX stress test 10/6/2020 (independently interpreted and reviewed personally)  Summary:  1.  Moderate functional limitation  2.  Maximal effort study  3.  No electrocardiographic evidence of ischemia at achieved level of exercise  4.  Peak VO2 was 17.8 ml/kg/m which is 88% predicted and therefore suggests no significant      cardiovascular limitation to  exercise  5.  VO2 pulse rolo to 100% predicted indicating adequate augmentation of stroke volume with   exercise  6.  At most mild chronotropic incompetence  7.  No significant obstructive lung disease  8.  Moderate to severe restrictive lung pattern  9.  Abnormal breathing reserve indicating that patient's limitation was primarily pulmonary in   nature  10.  Normal end tidal CO2 at peak exercise indicating adequate perfusion/ventilation matching at   peak exercise.     Overall patient's cardiopulmonary stress test suggests that his limitation is not primarily   cardiac and may be more related to deconditioning and obesity.    Exercise stress test 9/4/2020 (independently interpreted and reviewed personally)  1.  No symptoms of angina occurred during stress.  2.  EKG was non diagnostic.  3.  No significant dysrhythmias were noted.  4.  There was a normal blood pressure response to exercise.  5.  The patient achieved Functional poor functional class .    Lexiscan nuclear stress test 5/3/2019  1. Normal myocardial perfusion examination.   2. The overall quality of the study is fair. Findings attributable to diaphragmatic attenuation are noted.   3. Normal perfusion imaging without evidence of inducible ischemia or infarct.  4. Borderline increased left ventricular volume with normal systolic thickening and function and an ejection fraction of 56%.  5. No previous study was available for comparison    Carotid Arterial Doppler 2/1/2018   * Normal right ICA.    * Left ICA stenosis: <50%.    * There is antegrade flow in both the right and left vertebral  arteries.    48 hour Holter monitor for Stephan Henderson performed 10/22/19 to 10/24/19.  Overall, the rhythm was sinus rhythm (HR 48 to 88 bpm, Avg HR 59 bpm).  There were no sustained arrhythmias or significant pauses.  PVC burden was only 0.95%.      Electronically Signed by:    Levy El DO, FACC 3/14/2023    [This note used Dragon technology. Transcription  errors are not uncommon and may not have been corrected prior to electronically signing the note. Should you find these errors, please consult the clinician for interpretation (or apply common sense adjustment when safe and appropriate).]    Note to patient: The 21st Century Cures Act makes medical notes like these available to patients in the interest of transparency. However, this is a medical document intended as peer to peer communication. It is written in medical language and may contain abbreviations or verbiage that are unfamiliar.  It may appear blunt or direct. Medical documents are intended to carry relevant information, facts as evident, and the clinical impression/opinion of the practitioner
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
## 2023-09-14 NOTE — ED PROVIDER NOTE - RATE
101 [employer] Pregnancy And Lactation Text: This medication is Pregnancy Category X and is absolutely contraindicated during pregnancy. It is unknown if it is excreted in breast milk.

## 2023-10-05 NOTE — BEHAVIORAL HEALTH ASSESSMENT NOTE - NS ED [facility] MED ROS SKIN
Contacted patient to obtain BP/HR readings. Patient denies any cardiac symptoms or concerns.     10/5/23 187/95 & 175/109 & 191/94 & 171/88  10/4 166/82   10/2 149/90  10/1 164/101    Random September readings: 147/88, 154/94, 136/97    Patient reports BP readings are done at 10am and Lisinopril is being take at 10pm nightly.    Per last office visit with Dr. Davidson on 08/10/23, it states \"She is off of midodrine due to persist high blood pressures now she is had episodes of significant hypertension as documented at home and here, previously toleratd up to 40 mg of lisinopril, will start on 5 mg lisinopril, she is more than welcome to send her blood pressure logs to hear as well as Dr. Rodriguez's office.  Follow-up with our office can be in 6 months we did discuss that she can complete her carotid ultrasound in [place], previous study was 2015 and reported to be normal but in light of the recurrent lightheadedness and dizziness is not unreasonable to re-evaluate this.\"       No complaints

## 2024-08-15 NOTE — SWALLOW BEDSIDE ASSESSMENT ADULT - SWALLOW EVAL: CURRENT DIET
Last visit 8/2/24  Next visit 2/6/25  
Patient's wife said patient takes 1/2 tablet daily. Please review and advise.     Reason for call:   [] Refill   [] Prior Auth  [x] Other: change in directions    Office:   [x] PCP/Provider -   [] Specialty/Provider -     Medication: Valsartan 40mg    Dose/Frequency: 1/2 tab daily    Quantity: 45    Pharmacy: [facility] [place] [address] [phone]     Does the patient have enough for 3 days?   [] Yes   [x] No - 2 day supply    
clear liquid diet, cleared for advancing trials by Jada SUN at [facility] #[id number]

## 2024-11-29 NOTE — SWALLOW BEDSIDE ASSESSMENT ADULT - SWALLOW EVAL: RECOMMENDED FEEDING/EATING TECHNIQUES
Mammogram is normal, repeat in 1 year
allow for swallow between intakes/no straws/small sips/bites/maintain upright posture during/after eating for 30 mins/oral hygiene/check mouth frequently for oral residue/pocketing/position upright (90 degrees)/alternate food with liquid

## 2025-07-11 NOTE — BEHAVIORAL HEALTH ASSESSMENT NOTE - NS ED [facility] MED ROS ENDOCRINE
Patient ID: Linette Parker is a 80 y.o. female.    Subjective    Patient presented with a 20-pound weight loss over 6 months in DEC 2013. Was diagnosed with Chronic lymphocytic leukemia in 2013, Castorena stage II, ZAP-70 positive, IgVH unmutated, normal cytogenetics, FISH negative for 11q23, trisomy 12, 13q14, and 17p. She also had cervical lymphadenopathy and white blood cell count of 66,000. CAT scan on December 2, 2013, revealed splenomegaly and diffuse lymphadenopathy.  At the time of diagnosis, the hemoglobin and platelets were normal at 13 and 155. She received 6 cycles of bendamustine 70 mg/m2 and rituximab. The patient developed an infusion reaction to her first dose of rituximab. She has completed 6 cycles of BR ending in June 2014 without problems. End of treatment marrow showed CR with no MRD on flow. Due to worsening lymphadenopathy and blood counts, ibrutinib initiated 4/2021.     She tolerated ibrutinib very well. Initially she had easy bruising. This has resolved.   8/23/2024 Ms. Parker presents today for routine follow-up visit on ibrutinib. She is compliant with her ibrutinib and denies any noticeable side effects. She is active and denies any dyspnea, night sweats, noticeable LAD, cough, diarrhea. Appetite is stable. She noted more meds are needed to manage HTN, and had UTI recently.    10/24/2024: Today she feels well. No longer taking ibrutinib.     5/23/2025: She feels well. No fever weight loss night sweats or fatigue.     6/27/2025: Patient presents with her granddaughter. She feels very well. She denies any new symptoms or issues since her previous visit. She continues to be very active at the [facility] and taking long walks without new limitations. She has good appetite. She denies fever, chills, LAD, night sweats, dyspnea, chest pain, cough, n/v/d/c, pain, urinary issues. She is planning a bus trip to [place] 8/1/25-8/5/25.     Treatment History:   R-Madie x 6 in 2014.  Ibrutinib  initiated 4/2021 to present.          Objective    BSA: 1.63 meters squared  BP (!) 184/66 (BP Location: Left arm, Patient Position: Sitting, BP Cuff Size: Adult) Comment: 207 82  Pulse 78   Temp 36.4 °C (97.5 °F) (Temporal)   Wt 58.9 kg (129 lb 14.4 oz)   SpO2 100%   BMI 22.26 kg/m²     No LAD. No splenomegaly. No skin lesions.     Physical Exam  Constitutional:       General: She is not in acute distress.     Appearance: She is not toxic-appearing.   HENT:      Head: Normocephalic.      Nose: Nose normal.      Mouth/Throat:      Mouth: Mucous membranes are moist.   Eyes:      Extraocular Movements: Extraocular movements intact.      Pupils: Pupils are equal, round, and reactive to light.   Cardiovascular:      Rate and Rhythm: Normal rate and regular rhythm.      Heart sounds: No murmur heard.  Pulmonary:      Effort: Pulmonary effort is normal.      Breath sounds: Normal breath sounds.   Abdominal:      General: Bowel sounds are normal.      Palpations: Abdomen is soft. There is no mass.      Tenderness: There is no abdominal tenderness. There is no rebound.   Musculoskeletal:         General: No swelling, tenderness, deformity or signs of injury.      Right lower leg: No edema.      Left lower leg: No edema.   Skin:     Coloration: Skin is not jaundiced.      Findings: No bruising, lesion or rash.   Neurological:      Mental Status: She is alert and oriented to person, place, and time.      Cranial Nerves: No cranial nerve deficit.      Motor: No weakness.      Gait: Gait normal.   Psychiatric:         Mood and Affect: Mood normal.       Performance Status:  Asymptomatic      Assessment/Plan   #Chronic lymphocytic leukemia  -Initially in 2013, Castorena stage II, ZAP-70 positive, IgVH unmutated with no high risk cytogenetics.   -CR with 6 cycles of bendamustine/rituximab ending in June 2014. Bone marrow biopsy confirmed achievement of complete remission with no MRD on flow.   -Relapsed in 2021: increasing  lymphadenopathy and symptomatic splenomegaly. She had fatigue and non-drenching night sweats. PET scan (5/4/21) showed hepatosplenomegaly without significantly increased metabolic activity. Innumerable supraclavicular, axillary, retroperitoneal, and inguinal lymph nodes without significantly increased metabolic activity.   -She initiated therapy with Ibrutinib in late April 2021 now  with resolution in her lymphadenopathy and splenomegaly on clinical exam.  - 5/24/2024: Doing well on ibrutinib with excellent tolerance.  - Labs stable. No LAD on exam. Continue ibrutinib 420 mg daily (4/2021 --> ).  - RTC in 3-4 months with blood work.  - Continue acyclovir 400 mg daily for HSV/VZV prophylaxis.  - 8/23: reviewed CT CAP showing no LAD or splenomegaly. CBC is WNL. So she is in CR. On the other hand, her BP has been up, likely contributed by ibrutinib. The drug does have many other AE. Thoroughly discussed the pros and cons of ibrutinib, and agreed to stop it (ibrutinib 4/2024 -- 8/23/2024). Will monitor q2m.   -10/24: Off ibrutinib since 8/23/2024, after having achieved a CR while on the drug for 3.5 years. Discussed MRD detection is a research tool, but has not been validated for treatment yet. Will monitor in 3 months.   -5/23/2025: discussed her lab tests. Although asymptomatic, rising ALC and declining Hgb are consistent with CLL relapse. We anticipate she will need treatment within the next few months.   -6/27/2025: WBC/ALC continues to rise, hgb slowly declining. 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      #Plan 7/11/2025  -If BTK mutation is negative will likely start zanubrutinib + venetoclax.   -RTC 2-3w.       6/27/2025  -Likely relapse and will need treatment soon (Previously ibrutinib 420 mg 4/2021 -- 8/2024).  -Will repeat tumor sequencing, BTK mutation analysis, PET/CT, TTE  -Start allopurinol and lokelma   -RTC 2-3 weeks to consider venetoclax + ritux (or obinu) and to discuss  treatment start timing (patient has a trip to [facility] currently scheduled 8/1-8/5; may need to delay/cancel this in favor of treatment)     Time spent: 35min, >50% on counseling and care coordination.      Cancer Staging   No matching staging information was found for the patient.      Oncology History   Cutaneous B-cell lymphoma (Multi)   6/27/2025 Initial Diagnosis    Cutaneous B-cell lymphoma (Multi)                   David Elias MD PhD             No complaints